# Patient Record
Sex: FEMALE | Race: WHITE | NOT HISPANIC OR LATINO | Employment: PART TIME | URBAN - METROPOLITAN AREA
[De-identification: names, ages, dates, MRNs, and addresses within clinical notes are randomized per-mention and may not be internally consistent; named-entity substitution may affect disease eponyms.]

---

## 2018-02-27 ASSESSMENT — MIFFLIN-ST. JEOR: SCORE: 1853.53

## 2018-02-28 ENCOUNTER — ANESTHESIA - HEALTHEAST (OUTPATIENT)
Dept: SURGERY | Facility: CLINIC | Age: 28
End: 2018-02-28

## 2018-02-28 ENCOUNTER — SURGERY - HEALTHEAST (OUTPATIENT)
Dept: SURGERY | Facility: CLINIC | Age: 28
End: 2018-02-28

## 2018-02-28 ASSESSMENT — MIFFLIN-ST. JEOR: SCORE: 1853.53

## 2018-03-03 ENCOUNTER — COMMUNICATION - HEALTHEAST (OUTPATIENT)
Dept: SCHEDULING | Facility: CLINIC | Age: 28
End: 2018-03-03

## 2020-11-10 LAB — CYTOLOGY CVX/VAG DOC THIN PREP: NORMAL

## 2021-06-01 VITALS — HEIGHT: 62 IN | WEIGHT: 258 LBS | BODY MASS INDEX: 47.48 KG/M2

## 2021-06-16 NOTE — ANESTHESIA CARE TRANSFER NOTE
Last vitals:   Vitals:    02/28/18 1745   BP: (!) 160/92   Pulse: (!) 107   Resp: 20   Temp: 36.9  C (98.4  F)   SpO2: 100%     Patient's level of consciousness is drowsy  Spontaneous respirations: yes  Maintains airway independently: yes  Dentition unchanged: yes  Oropharynx: oropharynx clear of all foreign objects    QCDR Measures:  ASA# 20 - Surgical Safety Checklist: WHO surgical safety checklist completed prior to induction  PQRS# 430 - Adult PONV Prevention: 4558F - Pt received => 2 anti-emetic agents (different classes) preop & intraop  ASA# 8 - Peds PONV Prevention: NA - Not pediatric patient, not GA or 2 or more risk factors NOT present  PQRS# 424 - Evelia-op Temp Management: 4559F - At least one body temp DOCUMENTED => 35.5C or 95.9F within required timeframe  PQRS# 426 - PACU Transfer Protocol: - Transfer of care checklist used  ASA# 14 - Acute Post-op Pain: ASA14B - Patient did NOT experience pain >= 7 out of 10

## 2021-06-16 NOTE — ANESTHESIA PROCEDURE NOTES
Peripheral Block    Patient location during procedure: pre-op  Start time: 2/28/2018 2:47 PM  End time: 2/28/2018 2:48 PM  post-op analgesia per surgeon order as noted in medical record  Staffing:  Performing  Anesthesiologist: DAKOTAH NUNEZ IV  Preanesthetic Checklist  Completed: patient identified, site marked, risks, benefits, and alternatives discussed, timeout performed, consent obtained, at patient's request, airway assessed, oxygen available, suction available, emergency drugs available and hand hygiene performed  Peripheral Block  Block type: saphenous  Prep: ChloraPrep  Patient position: supine  Patient monitoring: cardiac monitor, continuous pulse oximetry, heart rate and blood pressure  Laterality: left  Injection technique: ultrasound guided          lidocaine 1% local anesthesia used for local skin prep  Needle  Needle type: echogenic   Needle gauge: 21 G  Needle length: 4 in  no peripheral nerve catheter placed  Assessment  Injection assessment: no difficulty with injection, negative aspiration for heme, no paresthesia on injection and incremental injection

## 2021-06-16 NOTE — ANESTHESIA PREPROCEDURE EVALUATION
Anesthesia Evaluation      Patient summary reviewed   No history of anesthetic complications     Airway   Mallampati: II  Neck ROM: full   Pulmonary - normal exam                          Cardiovascular - negative ROS and normal exam   Neuro/Psych - negative ROS     Endo/Other - negative ROS      GI/Hepatic/Renal            Dental                         Anesthesia Plan  Planned anesthetic: general LMA and peripheral nerve block    ASA 3   Induction: intravenous   Anesthetic plan and risks discussed with: patient

## 2021-06-16 NOTE — ANESTHESIA POSTPROCEDURE EVALUATION
Patient: Ben Upton  LEFT ANKLE ARTHROSCOPIC DEBRIDEMENT, LATERAL LIGAMENT REPAIR, MEDIAL AND LATERAL TENDON REPAIR, POSTERIOR TALUS FRACTURE FRAGMENT EXCISION  Anesthesia type: general    Patient location: PACU  Last vitals:   Vitals:    02/28/18 1810   BP: (!) 171/93   Pulse: 94   Resp: (!) 53   Temp:    SpO2: 100%     Post vital signs: stable  Level of consciousness: awake and responds to simple questions  Post-anesthesia pain: pain controlled  Post-anesthesia nausea and vomiting: no  Pulmonary: unassisted, return to baseline  Cardiovascular: stable and blood pressure at baseline  Hydration: adequate  Anesthetic events: no    QCDR Measures:  ASA# 11 - Evelia-op Cardiac Arrest: ASA11B - Patient did NOT experience unanticipated cardiac arrest  ASA# 12 - Evelia-op Mortality Rate: ASA12B - Patient did NOT die  ASA# 13 - PACU Re-Intubation Rate: ASA13B - Patient did NOT require a new airway mgmt  ASA# 10 - Composite Anes Safety: ASA10A - No serious adverse event    Additional Notes:

## 2021-06-16 NOTE — ANESTHESIA PROCEDURE NOTES
Peripheral Block    Patient location during procedure: pre-op  Start time: 2/28/2018 2:45 PM  End time: 2/28/2018 2:47 PM  post-op analgesia per surgeon order as noted in medical record  Staffing:  Performing  Anesthesiologist: DAKOTAH NUNEZ IV  Preanesthetic Checklist  Completed: patient identified, site marked, risks, benefits, and alternatives discussed, timeout performed, consent obtained, at patient's request, airway assessed, oxygen available, suction available, emergency drugs available and hand hygiene performed  Peripheral Block  Block type: sciatic, popliteal  Prep: ChloraPrep  Patient position: right lateral decubitus  Patient monitoring: cardiac monitor, continuous pulse oximetry, heart rate and blood pressure  Laterality: left  Injection technique: ultrasound guided          lidocaine 1% local anesthesia used for local skin prep  Needle  Needle type: echogenic   Needle gauge: 21 G  Needle length: 4 in  no peripheral nerve catheter placed  Assessment  Injection assessment: negative aspiration for heme, no paresthesia on injection, incremental injection and no difficulty with injection

## 2023-06-15 ENCOUNTER — OFFICE VISIT (OUTPATIENT)
Dept: INTERNAL MEDICINE | Age: 33
End: 2023-06-15

## 2023-06-15 ENCOUNTER — LAB SERVICES (OUTPATIENT)
Dept: LAB | Age: 33
End: 2023-06-15

## 2023-06-15 VITALS
DIASTOLIC BLOOD PRESSURE: 72 MMHG | BODY MASS INDEX: 50.02 KG/M2 | HEART RATE: 117 BPM | HEIGHT: 64 IN | WEIGHT: 293 LBS | SYSTOLIC BLOOD PRESSURE: 112 MMHG | OXYGEN SATURATION: 98 %

## 2023-06-15 DIAGNOSIS — F90.0 ATTENTION DEFICIT HYPERACTIVITY DISORDER (ADHD), PREDOMINANTLY INATTENTIVE TYPE: ICD-10-CM

## 2023-06-15 DIAGNOSIS — F41.1 GAD (GENERALIZED ANXIETY DISORDER): ICD-10-CM

## 2023-06-15 DIAGNOSIS — F31.9 BIPOLAR 1 DISORDER (CMD): ICD-10-CM

## 2023-06-15 DIAGNOSIS — E55.9 VITAMIN D DEFICIENCY: ICD-10-CM

## 2023-06-15 DIAGNOSIS — G47.09 OTHER INSOMNIA: ICD-10-CM

## 2023-06-15 DIAGNOSIS — J45.20 MILD INTERMITTENT ASTHMA WITHOUT COMPLICATION: ICD-10-CM

## 2023-06-15 DIAGNOSIS — Z79.899 MEDICATION MANAGEMENT: Primary | ICD-10-CM

## 2023-06-15 DIAGNOSIS — F42.2 MIXED OBSESSIONAL THOUGHTS AND ACTS: ICD-10-CM

## 2023-06-15 DIAGNOSIS — Z12.4 PAP SMEAR FOR CERVICAL CANCER SCREENING: ICD-10-CM

## 2023-06-15 DIAGNOSIS — Z79.899 MEDICATION MANAGEMENT: ICD-10-CM

## 2023-06-15 DIAGNOSIS — Z78.9 USES BIRTH CONTROL: ICD-10-CM

## 2023-06-15 PROBLEM — F42.9 OCD (OBSESSIVE COMPULSIVE DISORDER): Status: ACTIVE | Noted: 2023-06-15

## 2023-06-15 LAB
ALBUMIN SERPL-MCNC: 3.5 G/DL (ref 3.6–5.1)
ALBUMIN/GLOB SERPL: 1.1 {RATIO} (ref 1–2.4)
ALP SERPL-CCNC: 148 UNITS/L (ref 45–117)
ALT SERPL-CCNC: 25 UNITS/L
ANION GAP SERPL CALC-SCNC: 10 MMOL/L (ref 7–19)
AST SERPL-CCNC: 19 UNITS/L
BILIRUB SERPL-MCNC: 0.4 MG/DL (ref 0.2–1)
BUN SERPL-MCNC: 15 MG/DL (ref 6–20)
BUN/CREAT SERPL: 19 (ref 7–25)
CALCIUM SERPL-MCNC: 9 MG/DL (ref 8.4–10.2)
CHLORIDE SERPL-SCNC: 109 MMOL/L (ref 97–110)
CHOLEST SERPL-MCNC: 167 MG/DL
CHOLEST/HDLC SERPL: 3.1 {RATIO}
CO2 SERPL-SCNC: 24 MMOL/L (ref 21–32)
CREAT SERPL-MCNC: 0.79 MG/DL (ref 0.51–0.95)
DEPRECATED RDW RBC: 45.6 FL (ref 39–50)
ERYTHROCYTE [DISTWIDTH] IN BLOOD: 16.8 % (ref 11–15)
FASTING DURATION TIME PATIENT: ABNORMAL H
GFR SERPLBLD BASED ON 1.73 SQ M-ARVRAT: >90 ML/MIN
GLOBULIN SER-MCNC: 3.3 G/DL (ref 2–4)
GLUCOSE SERPL-MCNC: 90 MG/DL (ref 70–99)
HCT VFR BLD CALC: 38 % (ref 36–46.5)
HDLC SERPL-MCNC: 54 MG/DL
HGB BLD-MCNC: 11.9 G/DL (ref 12–15.5)
LDLC SERPL CALC-MCNC: 98 MG/DL
MCH RBC QN AUTO: 23.8 PG (ref 26–34)
MCHC RBC AUTO-ENTMCNC: 31.3 G/DL (ref 32–36.5)
MCV RBC AUTO: 76.2 FL (ref 78–100)
NONHDLC SERPL-MCNC: 113 MG/DL
NRBC BLD MANUAL-RTO: 0 /100 WBC
PLATELET # BLD AUTO: 311 K/MCL (ref 140–450)
POTASSIUM SERPL-SCNC: 4.3 MMOL/L (ref 3.4–5.1)
PROT SERPL-MCNC: 6.8 G/DL (ref 6.4–8.2)
RBC # BLD: 4.99 MIL/MCL (ref 4–5.2)
SODIUM SERPL-SCNC: 139 MMOL/L (ref 135–145)
TRIGL SERPL-MCNC: 74 MG/DL
WBC # BLD: 8.8 K/MCL (ref 4.2–11)

## 2023-06-15 PROCEDURE — 85025 COMPLETE CBC W/AUTO DIFF WBC: CPT | Performed by: CLINICAL MEDICAL LABORATORY

## 2023-06-15 PROCEDURE — 80061 LIPID PANEL: CPT | Performed by: CLINICAL MEDICAL LABORATORY

## 2023-06-15 PROCEDURE — 80053 COMPREHEN METABOLIC PANEL: CPT | Performed by: CLINICAL MEDICAL LABORATORY

## 2023-06-15 PROCEDURE — 99204 OFFICE O/P NEW MOD 45 MIN: CPT | Performed by: INTERNAL MEDICINE

## 2023-06-15 PROCEDURE — 82306 VITAMIN D 25 HYDROXY: CPT | Performed by: CLINICAL MEDICAL LABORATORY

## 2023-06-15 PROCEDURE — 99000 SPECIMEN HANDLING OFFICE-LAB: CPT | Performed by: INTERNAL MEDICINE

## 2023-06-15 RX ORDER — EPINEPHRINE 0.3 MG/.3ML
0.3 INJECTION SUBCUTANEOUS
Qty: 2 EACH | Refills: 1 | OUTPATIENT
Start: 2023-06-15

## 2023-06-15 RX ORDER — ONDANSETRON 8 MG/1
8 TABLET, ORALLY DISINTEGRATING ORAL EVERY 8 HOURS PRN
COMMUNITY
Start: 2023-03-20

## 2023-06-15 RX ORDER — CLOZAPINE 100 MG/1
300 TABLET ORAL AT BEDTIME
COMMUNITY
End: 2023-07-10 | Stop reason: SDUPTHER

## 2023-06-15 RX ORDER — ETONOGESTREL 68 MG/1
68 IMPLANT SUBCUTANEOUS
COMMUNITY

## 2023-06-15 RX ORDER — SODIUM FLUORIDE 5 MG/G
GEL, DENTIFRICE DENTAL
COMMUNITY
Start: 2023-02-06

## 2023-06-15 RX ORDER — EPINEPHRINE 0.3 MG/.3ML
0.3 INJECTION SUBCUTANEOUS
Qty: 2 EACH | Refills: 1 | Status: SHIPPED | OUTPATIENT
Start: 2023-06-15

## 2023-06-15 RX ORDER — DIPHENHYDRAMINE HCL 12.5MG/5ML
LIQUID (ML) ORAL
COMMUNITY

## 2023-06-15 RX ORDER — IBUPROFEN 600 MG/1
600 TABLET ORAL PRN
COMMUNITY
End: 2023-06-18 | Stop reason: ALTCHOICE

## 2023-06-15 RX ORDER — ACYCLOVIR 400 MG/1
400 TABLET ORAL PRN
COMMUNITY
Start: 2023-03-22

## 2023-06-15 RX ORDER — CLOMIPRAMINE HYDROCHLORIDE 50 MG/1
50 CAPSULE ORAL NIGHTLY
COMMUNITY
Start: 2023-05-30 | End: 2023-07-10 | Stop reason: SDUPTHER

## 2023-06-15 RX ORDER — ALBUTEROL SULFATE 2.5 MG/3ML
2.5 SOLUTION RESPIRATORY (INHALATION) EVERY 4 HOURS PRN
COMMUNITY
Start: 2022-12-30

## 2023-06-15 RX ORDER — ATOMOXETINE 80 MG/1
80 CAPSULE ORAL DAILY
COMMUNITY
Start: 2023-05-30 | End: 2023-07-10 | Stop reason: SDUPTHER

## 2023-06-15 ASSESSMENT — PATIENT HEALTH QUESTIONNAIRE - PHQ9
SUM OF ALL RESPONSES TO PHQ9 QUESTIONS 1 AND 2: 0
1. LITTLE INTEREST OR PLEASURE IN DOING THINGS: NOT AT ALL
SUM OF ALL RESPONSES TO PHQ9 QUESTIONS 1 AND 2: 0
CLINICAL INTERPRETATION OF PHQ2 SCORE: NO FURTHER SCREENING NEEDED
2. FEELING DOWN, DEPRESSED OR HOPELESS: NOT AT ALL

## 2023-06-16 ENCOUNTER — TELEPHONE (OUTPATIENT)
Dept: INTERNAL MEDICINE | Age: 33
End: 2023-06-16

## 2023-06-16 DIAGNOSIS — Z79.899 MEDICATION MANAGEMENT: Primary | ICD-10-CM

## 2023-06-16 LAB
25(OH)D3+25(OH)D2 SERPL-MCNC: 17.9 NG/ML (ref 30–100)
BASOPHILS # BLD: 0.1 K/MCL (ref 0–0.3)
BASOPHILS NFR BLD: 1 %
EOSINOPHIL # BLD: 0.4 K/MCL (ref 0–0.5)
EOSINOPHIL NFR BLD: 4 %
ERYTHROCYTE [DISTWIDTH] IN BLOOD: ABNORMAL %
HCT VFR BLD CALC: 38 % (ref 36–46.5)
HGB BLD-MCNC: 11.9 G/DL (ref 12–15.5)
IMM GRANULOCYTES # BLD AUTO: 0.1 K/MCL (ref 0–0.2)
IMM GRANULOCYTES # BLD: 1 %
LYMPHOCYTES # BLD: 1.9 K/MCL (ref 1–4.8)
LYMPHOCYTES NFR BLD: 21 %
MCH RBC QN AUTO: ABNORMAL PG
MCHC RBC AUTO-ENTMCNC: ABNORMAL G/DL
MCV RBC AUTO: ABNORMAL FL
MONOCYTES # BLD: 0.4 K/MCL (ref 0.3–0.9)
MONOCYTES NFR BLD: 5 %
NEUTROPHILS # BLD: 6.3 K/MCL (ref 1.8–7.7)
NEUTROPHILS NFR BLD: 68 %
PLATELET # BLD AUTO: 311 K/MCL (ref 140–450)
RBC # BLD: ABNORMAL 10*6/UL
WBC # BLD: 8.8 K/MCL (ref 4.2–11)

## 2023-06-18 ENCOUNTER — HOSPITAL ENCOUNTER (OUTPATIENT)
Age: 33
Discharge: HOME | End: 2023-06-18
Attending: EMERGENCY MEDICINE

## 2023-06-18 VITALS
BODY MASS INDEX: 51.91 KG/M2 | TEMPERATURE: 97.9 F | DIASTOLIC BLOOD PRESSURE: 89 MMHG | HEART RATE: 111 BPM | SYSTOLIC BLOOD PRESSURE: 139 MMHG | WEIGHT: 293 LBS | HEIGHT: 63 IN | OXYGEN SATURATION: 100 % | RESPIRATION RATE: 16 BRPM

## 2023-06-18 DIAGNOSIS — S39.012A STRAIN OF LUMBAR REGION, INITIAL ENCOUNTER: Primary | ICD-10-CM

## 2023-06-18 PROCEDURE — 99202 OFFICE O/P NEW SF 15 MIN: CPT

## 2023-06-18 RX ORDER — LIDOCAINE 4 G/G
1 PATCH TOPICAL EVERY 24 HOURS
Qty: 7 PATCH | Refills: 0 | Status: SHIPPED | OUTPATIENT
Start: 2023-06-18

## 2023-06-18 RX ORDER — DICLOFENAC SODIUM 75 MG/1
75 TABLET, DELAYED RELEASE ORAL 2 TIMES DAILY PRN
Qty: 20 TABLET | Refills: 0 | Status: SHIPPED | OUTPATIENT
Start: 2023-06-18

## 2023-06-18 ASSESSMENT — PAIN SCALES - GENERAL: PAINLEVEL_OUTOF10: 8

## 2023-06-19 ENCOUNTER — TELEPHONE (OUTPATIENT)
Dept: INTERNAL MEDICINE | Age: 33
End: 2023-06-19

## 2023-06-20 ENCOUNTER — TELEPHONE (OUTPATIENT)
Dept: INTERNAL MEDICINE | Age: 33
End: 2023-06-20

## 2023-06-20 DIAGNOSIS — R79.89 LOW VITAMIN D LEVEL: Primary | ICD-10-CM

## 2023-06-20 DIAGNOSIS — D64.9 ANEMIA, UNSPECIFIED TYPE: ICD-10-CM

## 2023-06-20 DIAGNOSIS — R74.8 ELEVATED ALKALINE PHOSPHATASE LEVEL: ICD-10-CM

## 2023-06-21 ENCOUNTER — APPOINTMENT (OUTPATIENT)
Dept: OBGYN | Age: 33
End: 2023-06-21

## 2023-06-21 RX ORDER — ERGOCALCIFEROL 1.25 MG/1
1 CAPSULE ORAL
Qty: 12 CAPSULE | Refills: 0 | Status: SHIPPED | OUTPATIENT
Start: 2023-06-26 | End: 2023-09-20 | Stop reason: SDUPTHER

## 2023-06-22 ENCOUNTER — TELEPHONE (OUTPATIENT)
Dept: INTERNAL MEDICINE | Age: 33
End: 2023-06-22

## 2023-06-28 ENCOUNTER — OFFICE VISIT (OUTPATIENT)
Dept: OBGYN | Age: 33
End: 2023-06-28
Attending: INTERNAL MEDICINE

## 2023-06-28 VITALS
HEIGHT: 63 IN | BODY MASS INDEX: 51.91 KG/M2 | SYSTOLIC BLOOD PRESSURE: 118 MMHG | WEIGHT: 293 LBS | DIASTOLIC BLOOD PRESSURE: 84 MMHG

## 2023-06-28 DIAGNOSIS — Z01.419 ENCOUNTER FOR BREAST AND PELVIC EXAMINATION: Primary | ICD-10-CM

## 2023-06-28 DIAGNOSIS — Z12.4 SCREENING FOR MALIGNANT NEOPLASM OF CERVIX: ICD-10-CM

## 2023-06-28 PROCEDURE — G0101 CA SCREEN;PELVIC/BREAST EXAM: HCPCS | Performed by: NURSE PRACTITIONER

## 2023-06-28 PROCEDURE — 87624 HPV HI-RISK TYP POOLED RSLT: CPT | Performed by: CLINICAL MEDICAL LABORATORY

## 2023-06-28 PROCEDURE — 88175 CYTOPATH C/V AUTO FLUID REDO: CPT | Performed by: CLINICAL MEDICAL LABORATORY

## 2023-07-05 ENCOUNTER — HOSPITAL ENCOUNTER (OUTPATIENT)
Age: 33
Discharge: HOME | End: 2023-07-05
Attending: EMERGENCY MEDICINE

## 2023-07-05 VITALS
WEIGHT: 293 LBS | BODY MASS INDEX: 51.91 KG/M2 | DIASTOLIC BLOOD PRESSURE: 80 MMHG | HEIGHT: 63 IN | RESPIRATION RATE: 14 BRPM | TEMPERATURE: 98.5 F | OXYGEN SATURATION: 100 % | SYSTOLIC BLOOD PRESSURE: 121 MMHG | HEART RATE: 112 BPM

## 2023-07-05 DIAGNOSIS — L50.9 URTICARIA: Primary | ICD-10-CM

## 2023-07-05 PROCEDURE — 99211 OFF/OP EST MAY X REQ PHY/QHP: CPT

## 2023-07-05 PROCEDURE — 10002803 HB RX 637: Performed by: EMERGENCY MEDICINE

## 2023-07-05 RX ORDER — PREDNISONE 20 MG/1
60 TABLET ORAL DAILY
Qty: 12 TABLET | Refills: 0 | Status: SHIPPED | OUTPATIENT
Start: 2023-07-06 | End: 2023-07-10

## 2023-07-05 RX ORDER — CETIRIZINE HYDROCHLORIDE 10 MG/1
10 TABLET ORAL DAILY
Qty: 5 TABLET | Refills: 0 | Status: SHIPPED | OUTPATIENT
Start: 2023-07-05 | End: 2023-09-20

## 2023-07-05 RX ORDER — PREDNISONE 20 MG/1
60 TABLET ORAL ONCE
Status: COMPLETED | OUTPATIENT
Start: 2023-07-05 | End: 2023-07-05

## 2023-07-05 RX ADMIN — PREDNISONE 60 MG: 20 TABLET ORAL at 10:44

## 2023-07-07 LAB
CASE RPRT: NORMAL
CLINICAL INFO: NORMAL
CYTOLOGY CVX/VAG STUDY: NORMAL
HPV16+18+45 E6+E7MRNA CVX NAA+PROBE: NEGATIVE
Lab: NORMAL
PAP EDUCATIONAL NOTE: NORMAL
SPECIMEN ADEQUACY: NORMAL

## 2023-07-10 ENCOUNTER — BEHAVIORAL HEALTH (OUTPATIENT)
Dept: BEHAVIORAL HEALTH | Age: 33
End: 2023-07-10
Attending: INTERNAL MEDICINE

## 2023-07-10 DIAGNOSIS — F90.0 ATTENTION DEFICIT HYPERACTIVITY DISORDER (ADHD), PREDOMINANTLY INATTENTIVE TYPE: ICD-10-CM

## 2023-07-10 DIAGNOSIS — F31.9 BIPOLAR 1 DISORDER (CMD): Primary | ICD-10-CM

## 2023-07-10 DIAGNOSIS — F42.2 MIXED OBSESSIONAL THOUGHTS AND ACTS: ICD-10-CM

## 2023-07-10 DIAGNOSIS — G24.01 TARDIVE DYSKINESIA: ICD-10-CM

## 2023-07-10 DIAGNOSIS — F41.1 GAD (GENERALIZED ANXIETY DISORDER): ICD-10-CM

## 2023-07-10 PROCEDURE — 90792 PSYCH DIAG EVAL W/MED SRVCS: CPT

## 2023-07-10 RX ORDER — TRAZODONE HYDROCHLORIDE 50 MG/1
25 TABLET ORAL NIGHTLY PRN
Qty: 15 TABLET | Refills: 5 | Status: SHIPPED | OUTPATIENT
Start: 2023-07-10

## 2023-07-10 RX ORDER — ATOMOXETINE 80 MG/1
80 CAPSULE ORAL DAILY
Qty: 30 CAPSULE | Refills: 5 | Status: SHIPPED | OUTPATIENT
Start: 2023-07-10

## 2023-07-10 RX ORDER — CLOZAPINE 100 MG/1
300 TABLET ORAL AT BEDTIME
Qty: 90 TABLET | Refills: 5 | Status: SHIPPED | OUTPATIENT
Start: 2023-07-10

## 2023-07-10 RX ORDER — CLOMIPRAMINE HYDROCHLORIDE 50 MG/1
50 CAPSULE ORAL NIGHTLY
Qty: 30 CAPSULE | Refills: 5 | Status: SHIPPED | OUTPATIENT
Start: 2023-07-10

## 2023-07-10 RX ORDER — PROPRANOLOL HYDROCHLORIDE 20 MG/1
20 TABLET ORAL 2 TIMES DAILY
Qty: 60 TABLET | Refills: 5 | Status: SHIPPED | OUTPATIENT
Start: 2023-07-10

## 2023-07-10 RX ORDER — VALBENAZINE 60 MG/1
60 CAPSULE ORAL DAILY
Qty: 30 CAPSULE | Refills: 5 | Status: SHIPPED | OUTPATIENT
Start: 2023-07-10 | End: 2023-10-12 | Stop reason: DRUGHIGH

## 2023-07-14 ENCOUNTER — TELEPHONE (OUTPATIENT)
Dept: BEHAVIORAL HEALTH | Age: 33
End: 2023-07-14

## 2023-07-14 ENCOUNTER — APPOINTMENT (OUTPATIENT)
Dept: LAB | Age: 33
End: 2023-07-14

## 2023-07-14 ENCOUNTER — LAB SERVICES (OUTPATIENT)
Dept: LAB | Age: 33
End: 2023-07-14

## 2023-07-14 DIAGNOSIS — F41.1 GAD (GENERALIZED ANXIETY DISORDER): ICD-10-CM

## 2023-07-14 DIAGNOSIS — F31.9 BIPOLAR 1 DISORDER (CMD): ICD-10-CM

## 2023-07-14 LAB
BASOPHILS # BLD: 0.1 K/MCL (ref 0–0.3)
BASOPHILS NFR BLD: 1 %
DEPRECATED RDW RBC: 46.3 FL (ref 39–50)
EOSINOPHIL # BLD: 0.6 K/MCL (ref 0–0.5)
EOSINOPHIL NFR BLD: 5 %
ERYTHROCYTE [DISTWIDTH] IN BLOOD: 16.7 % (ref 11–15)
HCT VFR BLD CALC: 36.9 % (ref 36–46.5)
HGB BLD-MCNC: 11.4 G/DL (ref 12–15.5)
IMM GRANULOCYTES # BLD AUTO: 0.1 K/MCL (ref 0–0.2)
IMM GRANULOCYTES # BLD: 1 %
LYMPHOCYTES # BLD: 2.4 K/MCL (ref 1–4.8)
LYMPHOCYTES NFR BLD: 19 %
MCH RBC QN AUTO: 23.7 PG (ref 26–34)
MCHC RBC AUTO-ENTMCNC: 30.9 G/DL (ref 32–36.5)
MCV RBC AUTO: 76.7 FL (ref 78–100)
MONOCYTES # BLD: 0.7 K/MCL (ref 0.3–0.9)
MONOCYTES NFR BLD: 5 %
NEUTROPHILS # BLD: 8.5 K/MCL (ref 1.8–7.7)
NEUTROPHILS NFR BLD: 69 %
NRBC BLD MANUAL-RTO: 0 /100 WBC
PLATELET # BLD AUTO: 337 K/MCL (ref 140–450)
RBC # BLD: 4.81 MIL/MCL (ref 4–5.2)
WBC # BLD: 12.4 K/MCL (ref 4.2–11)

## 2023-07-14 PROCEDURE — 85025 COMPLETE CBC W/AUTO DIFF WBC: CPT | Performed by: CLINICAL MEDICAL LABORATORY

## 2023-07-14 PROCEDURE — 99000 SPECIMEN HANDLING OFFICE-LAB: CPT | Performed by: INTERNAL MEDICINE

## 2023-08-05 ENCOUNTER — HOSPITAL ENCOUNTER (OUTPATIENT)
Age: 33
Discharge: HOME | End: 2023-08-05

## 2023-08-05 VITALS
OXYGEN SATURATION: 96 % | DIASTOLIC BLOOD PRESSURE: 88 MMHG | TEMPERATURE: 98.5 F | HEIGHT: 62 IN | WEIGHT: 293 LBS | HEART RATE: 116 BPM | BODY MASS INDEX: 53.92 KG/M2 | SYSTOLIC BLOOD PRESSURE: 132 MMHG | RESPIRATION RATE: 20 BRPM

## 2023-08-05 DIAGNOSIS — B37.2 CANDIDAL INTERTRIGO: Primary | ICD-10-CM

## 2023-08-05 PROCEDURE — 99211 OFF/OP EST MAY X REQ PHY/QHP: CPT

## 2023-08-05 ASSESSMENT — PAIN SCALES - GENERAL
PAINLEVEL_OUTOF10: 4
PAINLEVEL_OUTOF10: 4
PAINLEVEL_OUTOF10: 0

## 2023-08-15 ENCOUNTER — TELEPHONE (OUTPATIENT)
Dept: BEHAVIORAL HEALTH | Age: 33
End: 2023-08-15

## 2023-08-15 ENCOUNTER — LAB SERVICES (OUTPATIENT)
Dept: LAB | Age: 33
End: 2023-08-15

## 2023-08-15 DIAGNOSIS — F41.1 GAD (GENERALIZED ANXIETY DISORDER): ICD-10-CM

## 2023-08-15 DIAGNOSIS — F31.9 BIPOLAR 1 DISORDER (CMD): ICD-10-CM

## 2023-08-15 LAB
BASOPHILS # BLD: 0.1 K/MCL (ref 0–0.3)
BASOPHILS NFR BLD: 1 %
DEPRECATED RDW RBC: 43.9 FL (ref 39–50)
EOSINOPHIL # BLD: 0 K/MCL (ref 0–0.5)
EOSINOPHIL NFR BLD: 0 %
ERYTHROCYTE [DISTWIDTH] IN BLOOD: 16 % (ref 11–15)
HCT VFR BLD CALC: 37.5 % (ref 36–46.5)
HGB BLD-MCNC: 11.7 G/DL (ref 12–15.5)
IMM GRANULOCYTES # BLD AUTO: 0.1 K/MCL (ref 0–0.2)
IMM GRANULOCYTES # BLD: 1 %
LYMPHOCYTES # BLD: 2.3 K/MCL (ref 1–4.8)
LYMPHOCYTES NFR BLD: 22 %
MCH RBC QN AUTO: 23.7 PG (ref 26–34)
MCHC RBC AUTO-ENTMCNC: 31.2 G/DL (ref 32–36.5)
MCV RBC AUTO: 76.1 FL (ref 78–100)
MONOCYTES # BLD: 0.6 K/MCL (ref 0.3–0.9)
MONOCYTES NFR BLD: 6 %
NEUTROPHILS # BLD: 7.6 K/MCL (ref 1.8–7.7)
NEUTROPHILS NFR BLD: 70 %
NRBC BLD MANUAL-RTO: 0 /100 WBC
PLATELET # BLD AUTO: 346 K/MCL (ref 140–450)
RBC # BLD: 4.93 MIL/MCL (ref 4–5.2)
WBC # BLD: 10.6 K/MCL (ref 4.2–11)

## 2023-08-15 PROCEDURE — 99000 SPECIMEN HANDLING OFFICE-LAB: CPT | Performed by: INTERNAL MEDICINE

## 2023-08-15 PROCEDURE — 85025 COMPLETE CBC W/AUTO DIFF WBC: CPT | Performed by: CLINICAL MEDICAL LABORATORY

## 2023-09-11 ENCOUNTER — TELEPHONE (OUTPATIENT)
Dept: BEHAVIORAL HEALTH | Age: 33
End: 2023-09-11

## 2023-09-11 ENCOUNTER — LAB SERVICES (OUTPATIENT)
Dept: LAB | Age: 33
End: 2023-09-11

## 2023-09-11 DIAGNOSIS — F31.9 BIPOLAR 1 DISORDER (CMD): ICD-10-CM

## 2023-09-11 DIAGNOSIS — F41.1 GAD (GENERALIZED ANXIETY DISORDER): ICD-10-CM

## 2023-09-11 LAB
BASOPHILS # BLD: 0 K/MCL (ref 0–0.3)
BASOPHILS NFR BLD: 1 %
DEPRECATED RDW RBC: 46.6 FL (ref 39–50)
EOSINOPHIL # BLD: 0 K/MCL (ref 0–0.5)
EOSINOPHIL NFR BLD: 0 %
ERYTHROCYTE [DISTWIDTH] IN BLOOD: 16.5 % (ref 11–15)
HCT VFR BLD CALC: 40.1 % (ref 36–46.5)
HGB BLD-MCNC: 11.8 G/DL (ref 12–15.5)
IMM GRANULOCYTES # BLD AUTO: 0 K/MCL (ref 0–0.2)
IMM GRANULOCYTES # BLD: 1 %
LYMPHOCYTES # BLD: 1.9 K/MCL (ref 1–4.8)
LYMPHOCYTES NFR BLD: 22 %
MCH RBC QN AUTO: 23.4 PG (ref 26–34)
MCHC RBC AUTO-ENTMCNC: 29.4 G/DL (ref 32–36.5)
MCV RBC AUTO: 79.4 FL (ref 78–100)
MONOCYTES # BLD: 0.4 K/MCL (ref 0.3–0.9)
MONOCYTES NFR BLD: 5 %
NEUTROPHILS # BLD: 6.3 K/MCL (ref 1.8–7.7)
NEUTROPHILS NFR BLD: 71 %
NRBC BLD MANUAL-RTO: 0 /100 WBC
PLATELET # BLD AUTO: 315 K/MCL (ref 140–450)
RBC # BLD: 5.05 MIL/MCL (ref 4–5.2)
WBC # BLD: 8.7 K/MCL (ref 4.2–11)

## 2023-09-11 PROCEDURE — 85025 COMPLETE CBC W/AUTO DIFF WBC: CPT | Performed by: CLINICAL MEDICAL LABORATORY

## 2023-09-11 PROCEDURE — 99000 SPECIMEN HANDLING OFFICE-LAB: CPT | Performed by: INTERNAL MEDICINE

## 2023-09-20 ENCOUNTER — OFFICE VISIT (OUTPATIENT)
Dept: INTERNAL MEDICINE | Age: 33
End: 2023-09-20

## 2023-09-20 VITALS
DIASTOLIC BLOOD PRESSURE: 70 MMHG | OXYGEN SATURATION: 98 % | BODY MASS INDEX: 53.92 KG/M2 | WEIGHT: 293 LBS | HEIGHT: 62 IN | SYSTOLIC BLOOD PRESSURE: 112 MMHG | HEART RATE: 102 BPM

## 2023-09-20 DIAGNOSIS — Z00.00 ANNUAL PHYSICAL EXAM: Primary | ICD-10-CM

## 2023-09-20 DIAGNOSIS — E55.9 VITAMIN D DEFICIENCY: ICD-10-CM

## 2023-09-20 DIAGNOSIS — Z23 NEED FOR VACCINATION: Primary | ICD-10-CM

## 2023-09-20 DIAGNOSIS — R79.89 LOW VITAMIN D LEVEL: ICD-10-CM

## 2023-09-20 DIAGNOSIS — F31.9 BIPOLAR 1 DISORDER (CMD): ICD-10-CM

## 2023-09-20 DIAGNOSIS — F90.0 ATTENTION DEFICIT HYPERACTIVITY DISORDER (ADHD), PREDOMINANTLY INATTENTIVE TYPE: ICD-10-CM

## 2023-09-20 DIAGNOSIS — J45.20 MILD INTERMITTENT ASTHMA WITHOUT COMPLICATION: ICD-10-CM

## 2023-09-20 PROCEDURE — 90686 IIV4 VACC NO PRSV 0.5 ML IM: CPT | Performed by: INTERNAL MEDICINE

## 2023-09-20 PROCEDURE — 99214 OFFICE O/P EST MOD 30 MIN: CPT | Performed by: INTERNAL MEDICINE

## 2023-09-20 RX ORDER — ALBUTEROL SULFATE 90 UG/1
2 AEROSOL, METERED RESPIRATORY (INHALATION) EVERY 4 HOURS PRN
Qty: 1 EACH | Refills: 1 | Status: SHIPPED | OUTPATIENT
Start: 2023-09-20

## 2023-09-20 RX ORDER — ERGOCALCIFEROL 1.25 MG/1
1 CAPSULE ORAL
Qty: 8 CAPSULE | Refills: 0 | Status: SHIPPED | OUTPATIENT
Start: 2023-09-20 | End: 2023-11-14

## 2023-09-20 ASSESSMENT — PATIENT HEALTH QUESTIONNAIRE - PHQ9
SUM OF ALL RESPONSES TO PHQ9 QUESTIONS 1 AND 2: 0
CLINICAL INTERPRETATION OF PHQ2 SCORE: NO FURTHER SCREENING NEEDED
1. LITTLE INTEREST OR PLEASURE IN DOING THINGS: NOT AT ALL
SUM OF ALL RESPONSES TO PHQ9 QUESTIONS 1 AND 2: 0
2. FEELING DOWN, DEPRESSED OR HOPELESS: NOT AT ALL

## 2023-09-21 ENCOUNTER — TELEPHONE (OUTPATIENT)
Dept: BEHAVIORAL HEALTH | Age: 33
End: 2023-09-21

## 2023-09-26 ENCOUNTER — BEHAVIORAL HEALTH (OUTPATIENT)
Dept: BEHAVIORAL HEALTH | Age: 33
End: 2023-09-26

## 2023-09-26 DIAGNOSIS — F31.9 BIPOLAR 1 DISORDER (CMD): Primary | ICD-10-CM

## 2023-09-26 DIAGNOSIS — F41.1 GAD (GENERALIZED ANXIETY DISORDER): ICD-10-CM

## 2023-09-26 DIAGNOSIS — F90.0 ATTENTION DEFICIT HYPERACTIVITY DISORDER (ADHD), PREDOMINANTLY INATTENTIVE TYPE: ICD-10-CM

## 2023-09-26 DIAGNOSIS — F42.2 MIXED OBSESSIONAL THOUGHTS AND ACTS: ICD-10-CM

## 2023-09-26 PROCEDURE — 90791 PSYCH DIAGNOSTIC EVALUATION: CPT | Performed by: COUNSELOR

## 2023-10-04 ENCOUNTER — LAB SERVICES (OUTPATIENT)
Dept: LAB | Age: 33
End: 2023-10-04

## 2023-10-04 DIAGNOSIS — R74.8 ELEVATED ALKALINE PHOSPHATASE LEVEL: ICD-10-CM

## 2023-10-04 DIAGNOSIS — F31.9 BIPOLAR 1 DISORDER (CMD): ICD-10-CM

## 2023-10-04 DIAGNOSIS — F41.1 GAD (GENERALIZED ANXIETY DISORDER): ICD-10-CM

## 2023-10-04 DIAGNOSIS — R79.89 LOW VITAMIN D LEVEL: ICD-10-CM

## 2023-10-04 DIAGNOSIS — D64.9 ANEMIA, UNSPECIFIED TYPE: ICD-10-CM

## 2023-10-04 LAB
ALP SERPL-CCNC: 164 UNITS/L (ref 45–117)
BASOPHILS # BLD: 0 K/MCL (ref 0–0.3)
BASOPHILS NFR BLD: 0 %
DEPRECATED RDW RBC: 44.6 FL (ref 39–50)
EOSINOPHIL # BLD: 0 K/MCL (ref 0–0.5)
EOSINOPHIL NFR BLD: 0 %
ERYTHROCYTE [DISTWIDTH] IN BLOOD: 16 % (ref 11–15)
HCT VFR BLD CALC: 38.8 % (ref 36–46.5)
HGB BLD-MCNC: 11.8 G/DL (ref 12–15.5)
IMM GRANULOCYTES # BLD AUTO: 0 K/MCL (ref 0–0.2)
IMM GRANULOCYTES # BLD: 1 %
IRON SATN MFR SERPL: 12 % (ref 15–45)
IRON SERPL-MCNC: 40 MCG/DL (ref 50–170)
LYMPHOCYTES # BLD: 2.3 K/MCL (ref 1–4.8)
LYMPHOCYTES NFR BLD: 29 %
MCH RBC QN AUTO: 23.6 PG (ref 26–34)
MCHC RBC AUTO-ENTMCNC: 30.4 G/DL (ref 32–36.5)
MCV RBC AUTO: 77.4 FL (ref 78–100)
MONOCYTES # BLD: 0.4 K/MCL (ref 0.3–0.9)
MONOCYTES NFR BLD: 5 %
NEUTROPHILS # BLD: 5.4 K/MCL (ref 1.8–7.7)
NEUTROPHILS NFR BLD: 65 %
NRBC BLD MANUAL-RTO: 0 /100 WBC
PLATELET # BLD AUTO: 346 K/MCL (ref 140–450)
RBC # BLD: 5.01 MIL/MCL (ref 4–5.2)
TIBC SERPL-MCNC: 340 MCG/DL (ref 250–450)
WBC # BLD: 8.2 K/MCL (ref 4.2–11)

## 2023-10-04 PROCEDURE — 84075 ASSAY ALKALINE PHOSPHATASE: CPT | Performed by: CLINICAL MEDICAL LABORATORY

## 2023-10-04 PROCEDURE — 83540 ASSAY OF IRON: CPT | Performed by: CLINICAL MEDICAL LABORATORY

## 2023-10-04 PROCEDURE — 99000 SPECIMEN HANDLING OFFICE-LAB: CPT | Performed by: INTERNAL MEDICINE

## 2023-10-04 PROCEDURE — 85025 COMPLETE CBC W/AUTO DIFF WBC: CPT | Performed by: CLINICAL MEDICAL LABORATORY

## 2023-10-04 PROCEDURE — 83550 IRON BINDING TEST: CPT | Performed by: CLINICAL MEDICAL LABORATORY

## 2023-10-04 PROCEDURE — 82306 VITAMIN D 25 HYDROXY: CPT | Performed by: CLINICAL MEDICAL LABORATORY

## 2023-10-05 ENCOUNTER — TELEPHONE (OUTPATIENT)
Dept: BEHAVIORAL HEALTH | Age: 33
End: 2023-10-05

## 2023-10-05 LAB — 25(OH)D3+25(OH)D2 SERPL-MCNC: 42.6 NG/ML (ref 30–100)

## 2023-10-10 ENCOUNTER — BEHAVIORAL HEALTH (OUTPATIENT)
Dept: BEHAVIORAL HEALTH | Age: 33
End: 2023-10-10

## 2023-10-10 DIAGNOSIS — F31.9 BIPOLAR 1 DISORDER (CMD): Primary | ICD-10-CM

## 2023-10-10 DIAGNOSIS — F90.0 ATTENTION DEFICIT HYPERACTIVITY DISORDER (ADHD), PREDOMINANTLY INATTENTIVE TYPE: ICD-10-CM

## 2023-10-10 DIAGNOSIS — F42.2 MIXED OBSESSIONAL THOUGHTS AND ACTS: ICD-10-CM

## 2023-10-10 DIAGNOSIS — F41.1 GAD (GENERALIZED ANXIETY DISORDER): ICD-10-CM

## 2023-10-10 PROCEDURE — 90834 PSYTX W PT 45 MINUTES: CPT | Performed by: COUNSELOR

## 2023-10-12 ENCOUNTER — BEHAVIORAL HEALTH (OUTPATIENT)
Dept: BEHAVIORAL HEALTH | Age: 33
End: 2023-10-12

## 2023-10-12 DIAGNOSIS — F31.9 BIPOLAR 1 DISORDER (CMD): Primary | ICD-10-CM

## 2023-10-12 DIAGNOSIS — F90.0 ATTENTION DEFICIT HYPERACTIVITY DISORDER (ADHD), PREDOMINANTLY INATTENTIVE TYPE: ICD-10-CM

## 2023-10-12 DIAGNOSIS — F41.1 GAD (GENERALIZED ANXIETY DISORDER): ICD-10-CM

## 2023-10-12 DIAGNOSIS — F42.2 MIXED OBSESSIONAL THOUGHTS AND ACTS: ICD-10-CM

## 2023-10-12 DIAGNOSIS — G24.01 TARDIVE DYSKINESIA: ICD-10-CM

## 2023-10-12 PROCEDURE — 99214 OFFICE O/P EST MOD 30 MIN: CPT

## 2023-10-16 ENCOUNTER — TELEPHONE (OUTPATIENT)
Dept: INTERNAL MEDICINE | Age: 33
End: 2023-10-16

## 2023-10-16 DIAGNOSIS — E61.1 LOW IRON: Primary | ICD-10-CM

## 2023-10-20 RX ORDER — FERROUS SULFATE 325(65) MG
325 TABLET ORAL 2 TIMES DAILY
Qty: 60 TABLET | Refills: 0 | Status: SHIPPED | COMMUNITY
Start: 2023-10-20

## 2023-10-23 ENCOUNTER — TELEPHONE (OUTPATIENT)
Dept: BEHAVIORAL HEALTH | Age: 33
End: 2023-10-23

## 2023-11-02 ENCOUNTER — LAB SERVICES (OUTPATIENT)
Dept: LAB | Age: 33
End: 2023-11-02

## 2023-11-02 DIAGNOSIS — F31.9 BIPOLAR 1 DISORDER (CMD): ICD-10-CM

## 2023-11-02 DIAGNOSIS — F41.1 GAD (GENERALIZED ANXIETY DISORDER): ICD-10-CM

## 2023-11-02 LAB
BASOPHILS # BLD: 0.1 K/MCL (ref 0–0.3)
BASOPHILS NFR BLD: 1 %
DEPRECATED RDW RBC: 46.9 FL (ref 39–50)
EOSINOPHIL # BLD: 0 K/MCL (ref 0–0.5)
EOSINOPHIL NFR BLD: 0 %
ERYTHROCYTE [DISTWIDTH] IN BLOOD: 16.5 % (ref 11–15)
HCT VFR BLD CALC: 40.1 % (ref 36–46.5)
HGB BLD-MCNC: 12.1 G/DL (ref 12–15.5)
IMM GRANULOCYTES # BLD AUTO: 0 K/MCL (ref 0–0.2)
IMM GRANULOCYTES # BLD: 0 %
LYMPHOCYTES # BLD: 2.6 K/MCL (ref 1–4.8)
LYMPHOCYTES NFR BLD: 28 %
MCH RBC QN AUTO: 23.6 PG (ref 26–34)
MCHC RBC AUTO-ENTMCNC: 30.2 G/DL (ref 32–36.5)
MCV RBC AUTO: 78.3 FL (ref 78–100)
MONOCYTES # BLD: 0.5 K/MCL (ref 0.3–0.9)
MONOCYTES NFR BLD: 5 %
NEUTROPHILS # BLD: 6.2 K/MCL (ref 1.8–7.7)
NEUTROPHILS NFR BLD: 66 %
NRBC BLD MANUAL-RTO: 0 /100 WBC
PLATELET # BLD AUTO: 317 K/MCL (ref 140–450)
RBC # BLD: 5.12 MIL/MCL (ref 4–5.2)
WBC # BLD: 9.3 K/MCL (ref 4.2–11)

## 2023-11-02 PROCEDURE — 85025 COMPLETE CBC W/AUTO DIFF WBC: CPT | Performed by: CLINICAL MEDICAL LABORATORY

## 2023-11-02 PROCEDURE — 99000 SPECIMEN HANDLING OFFICE-LAB: CPT | Performed by: INTERNAL MEDICINE

## 2023-11-08 ENCOUNTER — APPOINTMENT (OUTPATIENT)
Dept: DERMATOLOGY | Age: 33
End: 2023-11-08
Attending: EMERGENCY MEDICINE

## 2023-11-30 ENCOUNTER — LAB SERVICES (OUTPATIENT)
Dept: LAB | Age: 33
End: 2023-11-30

## 2023-11-30 DIAGNOSIS — F41.1 GAD (GENERALIZED ANXIETY DISORDER): ICD-10-CM

## 2023-11-30 DIAGNOSIS — E61.1 LOW IRON: ICD-10-CM

## 2023-11-30 DIAGNOSIS — F31.9 BIPOLAR 1 DISORDER (CMD): ICD-10-CM

## 2023-11-30 LAB
BASOPHILS # BLD: 0 K/MCL (ref 0–0.3)
BASOPHILS NFR BLD: 0 %
DEPRECATED RDW RBC: 46.4 FL (ref 39–50)
EOSINOPHIL # BLD: 0 K/MCL (ref 0–0.5)
EOSINOPHIL NFR BLD: 0 %
ERYTHROCYTE [DISTWIDTH] IN BLOOD: 16.3 % (ref 11–15)
HCT VFR BLD CALC: 37.9 % (ref 36–46.5)
HGB BLD-MCNC: 11.6 G/DL (ref 12–15.5)
IMM GRANULOCYTES # BLD AUTO: 0 K/MCL (ref 0–0.2)
IMM GRANULOCYTES # BLD: 0 %
IRON SERPL-MCNC: 31 MCG/DL (ref 50–170)
LYMPHOCYTES # BLD: 2.1 K/MCL (ref 1–4.8)
LYMPHOCYTES NFR BLD: 22 %
MCH RBC QN AUTO: 24.2 PG (ref 26–34)
MCHC RBC AUTO-ENTMCNC: 30.6 G/DL (ref 32–36.5)
MCV RBC AUTO: 79.1 FL (ref 78–100)
MONOCYTES # BLD: 0.7 K/MCL (ref 0.3–0.9)
MONOCYTES NFR BLD: 7 %
NEUTROPHILS # BLD: 6.9 K/MCL (ref 1.8–7.7)
NEUTROPHILS NFR BLD: 71 %
NRBC BLD MANUAL-RTO: 0 /100 WBC
PLATELET # BLD AUTO: 313 K/MCL (ref 140–450)
RBC # BLD: 4.79 MIL/MCL (ref 4–5.2)
WBC # BLD: 9.7 K/MCL (ref 4.2–11)

## 2023-11-30 PROCEDURE — 83540 ASSAY OF IRON: CPT | Performed by: CLINICAL MEDICAL LABORATORY

## 2023-11-30 PROCEDURE — 84080 ASSAY ALKALINE PHOSPHATASES: CPT | Performed by: CLINICAL MEDICAL LABORATORY

## 2023-11-30 PROCEDURE — 85025 COMPLETE CBC W/AUTO DIFF WBC: CPT | Performed by: CLINICAL MEDICAL LABORATORY

## 2023-11-30 PROCEDURE — 99000 SPECIMEN HANDLING OFFICE-LAB: CPT | Performed by: INTERNAL MEDICINE

## 2023-11-30 PROCEDURE — 84075 ASSAY ALKALINE PHOSPHATASE: CPT | Performed by: CLINICAL MEDICAL LABORATORY

## 2023-12-01 ENCOUNTER — TELEPHONE (OUTPATIENT)
Dept: INTERNAL MEDICINE | Age: 33
End: 2023-12-01

## 2023-12-01 ENCOUNTER — TELEPHONE (OUTPATIENT)
Dept: BEHAVIORAL HEALTH | Age: 33
End: 2023-12-01

## 2023-12-04 LAB
ALP BONE CFR SERPL: 23.5 % (ref 10.7–68.3)
ALP BONE CFR SERPL: 36.7 U/L (ref 12.9–52.6)
ALP INTEST CFR SERPL: 10.5 U/L (ref 0–16.3)
ALP INTEST CFR SERPL: 6.7 % (ref 0–24.2)
ALP LIVER CFR SERPL: 108.9 U/L (ref 16–69.3)
ALP LIVER CFR SERPL: 69.8 % (ref 26–86.2)
ALP SERPL-CCNC: 156 U/L (ref 34–123)

## 2023-12-29 ENCOUNTER — TELEPHONE (OUTPATIENT)
Dept: INTERNAL MEDICINE | Age: 33
End: 2023-12-29

## 2023-12-29 ENCOUNTER — LAB SERVICES (OUTPATIENT)
Dept: LAB | Age: 33
End: 2023-12-29

## 2023-12-29 DIAGNOSIS — F31.9 BIPOLAR 1 DISORDER (CMD): ICD-10-CM

## 2023-12-29 DIAGNOSIS — R74.8 ELEVATED ALKALINE PHOSPHATASE LEVEL: ICD-10-CM

## 2023-12-29 DIAGNOSIS — F41.1 GAD (GENERALIZED ANXIETY DISORDER): ICD-10-CM

## 2023-12-29 DIAGNOSIS — R73.09 ELEVATED HEMOGLOBIN A1C: ICD-10-CM

## 2023-12-29 DIAGNOSIS — R74.8 ELEVATED ALKALINE PHOSPHATASE LEVEL: Primary | ICD-10-CM

## 2023-12-29 DIAGNOSIS — R73.03 PREDIABETES: ICD-10-CM

## 2023-12-29 LAB
BASOPHILS # BLD: 0 K/MCL (ref 0–0.3)
BASOPHILS NFR BLD: 0 %
DEPRECATED RDW RBC: 45.1 FL (ref 39–50)
EOSINOPHIL # BLD: 0.6 K/MCL (ref 0–0.5)
EOSINOPHIL NFR BLD: 6 %
ERYTHROCYTE [DISTWIDTH] IN BLOOD: 16 % (ref 11–15)
FERRITIN SERPL-MCNC: 25 NG/ML (ref 8–252)
HCT VFR BLD CALC: 38.9 % (ref 36–46.5)
HGB BLD-MCNC: 11.9 G/DL (ref 12–15.5)
IMM GRANULOCYTES # BLD AUTO: 0 K/MCL (ref 0–0.2)
IMM GRANULOCYTES # BLD: 0 %
IRON SATN MFR SERPL: 8 % (ref 15–45)
IRON SERPL-MCNC: 31 MCG/DL (ref 50–170)
LYMPHOCYTES # BLD: 2.3 K/MCL (ref 1–4.8)
LYMPHOCYTES NFR BLD: 22 %
MCH RBC QN AUTO: 23.8 PG (ref 26–34)
MCHC RBC AUTO-ENTMCNC: 30.6 G/DL (ref 32–36.5)
MCV RBC AUTO: 78 FL (ref 78–100)
MONOCYTES # BLD: 0.6 K/MCL (ref 0.3–0.9)
MONOCYTES NFR BLD: 6 %
NEUTROPHILS # BLD: 6.7 K/MCL (ref 1.8–7.7)
NEUTROPHILS NFR BLD: 66 %
NRBC BLD MANUAL-RTO: 0 /100 WBC
PLAT MORPH BLD: NORMAL
PLATELET # BLD AUTO: 308 K/MCL (ref 140–450)
RBC # BLD: 4.99 MIL/MCL (ref 4–5.2)
RBC MORPH BLD: NORMAL
TIBC SERPL-MCNC: 409 MCG/DL (ref 250–450)
WBC # BLD: 10.3 K/MCL (ref 4.2–11)
WBC MORPH BLD: NORMAL

## 2023-12-29 PROCEDURE — 99000 SPECIMEN HANDLING OFFICE-LAB: CPT | Performed by: INTERNAL MEDICINE

## 2023-12-29 PROCEDURE — 82728 ASSAY OF FERRITIN: CPT | Performed by: CLINICAL MEDICAL LABORATORY

## 2023-12-29 PROCEDURE — 83540 ASSAY OF IRON: CPT | Performed by: CLINICAL MEDICAL LABORATORY

## 2023-12-29 PROCEDURE — 86381 MITOCHONDRIAL ANTIBODY EACH: CPT | Performed by: CLINICAL MEDICAL LABORATORY

## 2023-12-29 PROCEDURE — 85025 COMPLETE CBC W/AUTO DIFF WBC: CPT | Performed by: CLINICAL MEDICAL LABORATORY

## 2023-12-29 PROCEDURE — 83550 IRON BINDING TEST: CPT | Performed by: CLINICAL MEDICAL LABORATORY

## 2023-12-29 PROCEDURE — 83036 HEMOGLOBIN GLYCOSYLATED A1C: CPT | Performed by: CLINICAL MEDICAL LABORATORY

## 2023-12-31 LAB — MITOCHONDRIA M2 IGG SER-ACNC: 6.5 UNITS

## 2024-01-02 ENCOUNTER — TELEPHONE (OUTPATIENT)
Dept: BEHAVIORAL HEALTH | Age: 34
End: 2024-01-02

## 2024-01-03 LAB — HBA1C MFR BLD: 5.3 % (ref 4.5–5.6)

## 2024-01-04 ENCOUNTER — TELEPHONE (OUTPATIENT)
Dept: INTERNAL MEDICINE | Age: 34
End: 2024-01-04

## 2024-01-11 ENCOUNTER — APPOINTMENT (OUTPATIENT)
Dept: BEHAVIORAL HEALTH | Age: 34
End: 2024-01-11

## 2024-01-15 ENCOUNTER — APPOINTMENT (OUTPATIENT)
Dept: BEHAVIORAL HEALTH | Age: 34
End: 2024-01-15

## 2024-01-15 DIAGNOSIS — F42.2 MIXED OBSESSIONAL THOUGHTS AND ACTS: ICD-10-CM

## 2024-01-15 DIAGNOSIS — F41.1 GAD (GENERALIZED ANXIETY DISORDER): ICD-10-CM

## 2024-01-15 DIAGNOSIS — F90.0 ATTENTION DEFICIT HYPERACTIVITY DISORDER (ADHD), PREDOMINANTLY INATTENTIVE TYPE: ICD-10-CM

## 2024-01-15 DIAGNOSIS — F31.9 BIPOLAR 1 DISORDER (CMD): Primary | ICD-10-CM

## 2024-01-15 PROCEDURE — 90834 PSYTX W PT 45 MINUTES: CPT | Performed by: COUNSELOR

## 2024-01-17 ENCOUNTER — E-ADVICE (OUTPATIENT)
Dept: INTERNAL MEDICINE | Age: 34
End: 2024-01-17

## 2024-01-18 DIAGNOSIS — E66.01 CLASS 3 SEVERE OBESITY WITH SERIOUS COMORBIDITY AND BODY MASS INDEX (BMI) OF 45.0 TO 49.9 IN ADULT, UNSPECIFIED OBESITY TYPE (H): Primary | ICD-10-CM

## 2024-01-18 DIAGNOSIS — E66.813 CLASS 3 SEVERE OBESITY WITH SERIOUS COMORBIDITY AND BODY MASS INDEX (BMI) OF 45.0 TO 49.9 IN ADULT, UNSPECIFIED OBESITY TYPE (H): Primary | ICD-10-CM

## 2024-01-18 NOTE — PROGRESS NOTES
"Estimated body mass index is 49.5 kg/m  as calculated from the following:    Height as of 2/13/19: 1.588 m (5' 2.5\").    Weight as of 3/31/21: 124.7 kg (275 lb).    "

## 2024-01-18 NOTE — PROGRESS NOTES
Medication Therapy Management (MTM) Encounter    ASSESSMENT:                            Medication Adherence/Access: no issues    Weight  management:  Would benefit from starting Zepbound for weight management. To get covered on insurance, needs to be prescribed through medication therapy management and ordered through our [place] specialty/mail order pharmacy. Provided counseling on Zepbound including dosing plan, side effects, and advised to watch a video on administration due to phone visit today. Patient would like to focus on increasing fruits and vegetables in diet.     Note: meets insurance criteria for GLP-1 RA due to BMI >40    PLAN:                            Start Zepbound 2.5 mg once weekly for 4 weeks. Then, increase to 5 mg once weekly   Continue to follow-up with primary care provider as planned    Follow-up: medication therapy management in 6 weeks to continue dose titration  Weight loss clinic scheduling line: [phone]    SUBJECTIVE/OBJECTIVE:                          Ben Upton is a 33 year old female called for an initial visit. She was referred to me from Marilynn Goldman NP.      Reason for visit: [employer] employee - GLP-1.    Allergies/ADRs: Reviewed in chart  Past Medical History: Reviewed in chart  Tobacco: She reports that she has never smoked. She has never used smokeless tobacco.  Alcohol: rarely  Lives in [place]. Receives all of her care [facility].  Medication Adherence/Access: no issues reported      Weight Management:   No current medications    Followed by her primary care provider, Dr. Harris, who recommends patients starts Zepbound.   Diet/Eating Habits: Patient reports 3 meals per day with 2 snacks. Breakfast: protein bar on work day. When more time: Premier pancakes and Kodiac waffles with two turkey sausage links. Lunch: premier protein drink. Dinner: carnitas burrito bowls, chicken salad, chicken cordon blue. Veggie tots. Protein rice. Would like to  "eat more fruits/vegetables.   Started dieting more a few weeks ago. Counting calories. One cheat day per week.  Exercise/Activity: Patient reports bowling every Thursday. Mall walking, new gym membership (has been 6 times so far, walks 2 miles on the treadmill).   Tried/Failed/Contraindicated Medications:   No hx pancreatitis/thyroid cancer  Metformin: diarrhea     Initial Consult Weight: 299 lbs, 13.2 oz  Weight: 136 kg  Height: 5' 2.5\"  BMI: 54.84  A1c: 5.3% - 12/29/23 per care everywhere    Today's Vitals: There were no vitals taken for this visit.  ----------------    I spent 27 minutes with this patient today. All changes were made via collaborative practice agreement with Marilynn Goldman CNP . A copy of the visit note was provided to the patient's provider(s).    A summary of these recommendations was sent via [employer].    Telemedicine Visit Details  Type of service:  Telephone visit  Start Time:  8:40 am  End Time:  9:07 am     Medication Therapy Recommendations  Obesity    Rationale: Untreated condition - Needs additional medication therapy - Indication   Recommendation: Start Medication - Zepbound 2.5 MG/0.5ML Soaj   Status: Accepted per CPA              "

## 2024-01-19 ENCOUNTER — VIRTUAL VISIT (OUTPATIENT)
Dept: CARDIOLOGY | Facility: CLINIC | Age: 34
End: 2024-01-19
Attending: PHYSICIAN ASSISTANT
Payer: COMMERCIAL

## 2024-01-19 DIAGNOSIS — E66.9 OBESITY: Primary | ICD-10-CM

## 2024-01-19 RX ORDER — ALBUTEROL SULFATE 0.83 MG/ML
2.5 SOLUTION RESPIRATORY (INHALATION) EVERY 6 HOURS PRN
COMMUNITY

## 2024-01-19 RX ORDER — FERROUS SULFATE 325(65) MG
1 TABLET ORAL DAILY
COMMUNITY
Start: 2023-10-20

## 2024-01-19 RX ORDER — ETONOGESTREL 68 MG/1
68 IMPLANT SUBCUTANEOUS ONCE
COMMUNITY

## 2024-01-19 RX ORDER — CHOLECALCIFEROL (VITAMIN D3) 50 MCG
2000 TABLET ORAL DAILY PRN
COMMUNITY
Start: 2023-03-09 | End: 2024-02-23

## 2024-01-19 ASSESSMENT — PAIN SCALES - GENERAL: PAINLEVEL: NO PAIN (0)

## 2024-01-19 NOTE — NURSING NOTE
Is the patient currently in the state of [place]? NO    Visit mode:TELEPHONE    If the visit is dropped, the patient can be reconnected by: TELEPHONE VISIT: Phone number:   No relevant phone numbers on file.       Will anyone else be joining the visit? NO  (If patient encounters technical issues they should call [phone]    How would you like to obtain your AVS? MyChart    Are changes needed to the allergy or medication list? No    Reason for visit: Consult    Muriel RUSSO

## 2024-01-19 NOTE — PATIENT INSTRUCTIONS
"Recommendations from today's MTM visit:                                                       Start Zepbound 2.5 mg once weekly for 4 weeks. Then, increase to 5 mg once weekly   Continue to follow-up with primary care provider as planned    [facility] - [place], [place] - [facility] [address] Phone: [phone]   Fax: [phone]        Follow-up: medication therapy management in 6 weeks to continue dose titration  Weight loss clinic scheduling line: [phone]    It was great speaking with you today.  I value your experience and would be very thankful for your time in providing feedback in our clinic survey. In the next few days, you may receive an email or text message from "[employer]" with a link to a survey related to your  clinical pharmacist.\"     To schedule another MTM appointment, please call the clinic directly or you may call the MTM scheduling line at [phone].    My Clinical Pharmacist's contact information:                                                      Please feel free to contact me with any questions or concerns you have.      Floresita Salinas, PharmD, AAHIVP  Medication Therapy Management Pharmacist     Zepbound (Tirzepatide)    Zepbound (Tirzepatide): is an injectable prescription medicine recently FDA approved for adults with obesity or overweight with an additional condition affected by their weight (type 2 diabetes, high blood pressure, high cholesterol, obstructive sleep apnea, etc). Zepbound contains the same active ingredient as what is in Mounjaro, an injectable FDA approved for Type 2 Diabtes. Zepbound is intended to be used along with dietary/behavioral changes and consistent exercise to improve assist with weight loss and other health improvement outcomes.     It is given as a shot once a week. It activates the body's receptors for GIP (glucose-dependent insulinotropic polypeptide) and GLP-1 (glucagon-like peptide-1) receptor, two naturally occurring hormones " that help tell the brain that you are full. It also works is by slowing down how quickly food leaves your stomach. What this means for you: You will start to feel fowler more quickly, notice portion changes and eat less often between meals. It is still important to maintain consistent eating schedule with meals +/- snacks and get in good hydration.      Dosing:  Initial dose: 2.5 mg injected subcutaneously once weekly for 4 weeks  Further dose changes can include: increase to 5 mg once weekly for 4 weeks, then 7.5 mg once weekly for 4 weeks, then 10 mg once weekly for 4 weeks then 12.5 mg once weekly for 4 weeks, then 15 mg (maximum dose) once weekly.    Dose changes are based on how you are tolerating the current dose, what benefits you are seeing at the current dose and if improvement in effectiveness of the drug is needed. The goal is to find the dose that works best for you with the least amount of side effects. You may find you reach this at a lower dose than the maximum dose.     Side effects: Patients are advised to eat more slowly and purposefully. Give yourself less portions. You may find after starting this medication you have a new point of fullness. It is also important to stay adequately hydrated on the medication to reduce risks of some of these side effects. Many of these side effects (nausea, diarrhea, etc) occurred during dose escalation but did improve over time with continuing the medication. If side effects are unmanageable, reach out to your prescribing provider.     The risk of pancreatitis (inflammation of the pancreas) has been associated with this type of medication, but is very rare.  If you have had pancreatitis in the past, this medication may not be for you. Please let us know about any past history of pancreas problems. If you experience persistent severe abdominal pain (sometimes radiating to the back potentially accompanied by vomiting and have a fever), stop the medication and  contact your provider immediately for assessment. They will do a blood test to check for pancreatitis.       How to Inject:   [email or web address]-[email or web address]-use    Storage:   Store your pen in the refrigerator. You may store your pen at room temperature for up to 21 days. If you store the pen at room temperature, do not return the pen to the refrigerator. Discard the pen if not used within 21 days after removing from the refrigerator.    For any questions or concerns please send a [employer] message to our team or call our weight management call center at [phone] during regular business hours.

## 2024-01-19 NOTE — Clinical Note
[place] patient - plans to continue following with primary care provider and mtm for weight mgmt.

## 2024-01-19 NOTE — LETTER
1/19/2024      RE: Ben Upton  [address] [address]       Dear Colleague,    Thank you for the opportunity to participate in the care of your patient, Ben Upton, at the [facility]. Please see a copy of my visit note below.    Medication Therapy Management (MTM) Encounter    ASSESSMENT:                            Medication Adherence/Access: no issues    Weight  management:  Would benefit from starting Zepbound for weight management. To get covered on insurance, needs to be prescribed through medication therapy management and ordered through our [place] specialty/mail order pharmacy. Provided counseling on Zepbound including dosing plan, side effects, and advised to watch a video on administration due to phone visit today. Patient would like to focus on increasing fruits and vegetables in diet.     PLAN:                            Start Zepbound 2.5 mg once weekly for 4 weeks. Then, increase to 5 mg once weekly   Continue to follow-up with primary care provider as planned    Follow-up: medication therapy management in 6 weeks to continue dose titration  Weight loss clinic scheduling line: [phone]    SUBJECTIVE/OBJECTIVE:                          Ben Upton is a 33 year old female called for an initial visit. She was referred to me from Marilynn Goldman NP.      Reason for visit: [employer] employee - GLP-1.    Allergies/ADRs: Reviewed in chart  Past Medical History: Reviewed in chart  Tobacco: She reports that she has never smoked. She has never used smokeless tobacco.  Alcohol: rarely  Lives in [place]. Receives all of her care through [facility].  Medication Adherence/Access: no issues reported      Weight Management:   No current medications    Followed by her primary care provider, Dr. Harris, who recommends patients starts Zepbound.   Diet/Eating Habits: Patient reports 3  "meals per day with 2 snacks. Breakfast: protein bar on work day. When more time: Premier pancakes and [employer]iac waffles with two turkey sausage links. Lunch: premier protein drink. Dinner: carnitas burrito bowls, chicken salad, chicken cordon blue. Veggie tots. Protein rice. Would like to eat more fruits/vegetables.   Started dieting more a few weeks ago. Counting calories. One cheat day per week.  Exercise/Activity: Patient reports bowling every Thursday. Mall walking, new gym membership (has been 6 times so far, walks 2 miles on the treadmill).   Tried/Failed/Contraindicated Medications:   No hx pancreatitis/thyroid cancer  Metformin: diarrhea     Initial Consult Weight: 299 lbs, 13.2 oz  Weight: 136 kg  Height: 5' 2.5\"  BMI: 54.84  A1c: 5.3% - 12/29/23 per care everywhere    Today's Vitals: There were no vitals taken for this visit.  ----------------    I spent 27 minutes with this patient today. All changes were made via collaborative practice agreement with Marilynn Goldman CNP . A copy of the visit note was provided to the patient's provider(s).    A summary of these recommendations was sent via [employer].    Telemedicine Visit Details  Type of service:  Telephone visit  Start Time:  8:40 am  End Time:  9:07 am     Medication Therapy Recommendations  Obesity    Rationale: Untreated condition - Needs additional medication therapy - Indication   Recommendation: Start Medication - Zepbound 2.5 MG/0.5ML Soaj   Status: Accepted per [facility]                Virtual Visit Details    Type of service:  Telephone Visit   Phone call duration: 27 minutes       Please do not hesitate to contact me if you have any questions/concerns.     Sincerely,     AMADEO CORTÉS RPH  "

## 2024-01-22 ENCOUNTER — E-ADVICE (OUTPATIENT)
Dept: BEHAVIORAL HEALTH | Age: 34
End: 2024-01-22

## 2024-01-22 DIAGNOSIS — F31.9 BIPOLAR 1 DISORDER (CMD): ICD-10-CM

## 2024-01-22 DIAGNOSIS — F42.2 MIXED OBSESSIONAL THOUGHTS AND ACTS: ICD-10-CM

## 2024-01-22 DIAGNOSIS — F41.1 GAD (GENERALIZED ANXIETY DISORDER): ICD-10-CM

## 2024-01-22 DIAGNOSIS — F90.0 ATTENTION DEFICIT HYPERACTIVITY DISORDER (ADHD), PREDOMINANTLY INATTENTIVE TYPE: ICD-10-CM

## 2024-01-22 RX ORDER — CLOMIPRAMINE HYDROCHLORIDE 50 MG/1
50 CAPSULE ORAL NIGHTLY
Qty: 30 CAPSULE | Refills: 5 | Status: SHIPPED | OUTPATIENT
Start: 2024-01-22

## 2024-01-22 RX ORDER — ATOMOXETINE 80 MG/1
80 CAPSULE ORAL DAILY
Qty: 30 CAPSULE | Refills: 5 | Status: SHIPPED | OUTPATIENT
Start: 2024-01-22

## 2024-01-22 RX ORDER — CLOZAPINE 100 MG/1
300 TABLET ORAL AT BEDTIME
Qty: 90 TABLET | Refills: 5 | Status: SHIPPED | OUTPATIENT
Start: 2024-01-22

## 2024-01-22 RX ORDER — PROPRANOLOL HYDROCHLORIDE 20 MG/1
20 TABLET ORAL 2 TIMES DAILY
Qty: 60 TABLET | Refills: 5 | Status: SHIPPED | OUTPATIENT
Start: 2024-01-22

## 2024-01-23 ENCOUNTER — TELEPHONE (OUTPATIENT)
Dept: INTERNAL MEDICINE | Age: 34
End: 2024-01-23

## 2024-01-23 DIAGNOSIS — E61.1 LOW IRON: ICD-10-CM

## 2024-01-24 ENCOUNTER — LAB SERVICES (OUTPATIENT)
Dept: LAB | Age: 34
End: 2024-01-24

## 2024-01-24 DIAGNOSIS — F41.1 GAD (GENERALIZED ANXIETY DISORDER): ICD-10-CM

## 2024-01-24 DIAGNOSIS — F31.9 BIPOLAR 1 DISORDER (CMD): ICD-10-CM

## 2024-01-24 LAB
BASOPHILS # BLD: 0.1 K/MCL (ref 0–0.3)
BASOPHILS NFR BLD: 1 %
DEPRECATED RDW RBC: 46.3 FL (ref 39–50)
EOSINOPHIL # BLD: 0 K/MCL (ref 0–0.5)
EOSINOPHIL NFR BLD: 0 %
ERYTHROCYTE [DISTWIDTH] IN BLOOD: 16 % (ref 11–15)
HCT VFR BLD CALC: 40 % (ref 36–46.5)
HGB BLD-MCNC: 12.1 G/DL (ref 12–15.5)
IMM GRANULOCYTES # BLD AUTO: 0 K/MCL (ref 0–0.2)
IMM GRANULOCYTES # BLD: 0 %
LYMPHOCYTES # BLD: 2.4 K/MCL (ref 1–4.8)
LYMPHOCYTES NFR BLD: 22 %
MCH RBC QN AUTO: 24.2 PG (ref 26–34)
MCHC RBC AUTO-ENTMCNC: 30.3 G/DL (ref 32–36.5)
MCV RBC AUTO: 80 FL (ref 78–100)
MONOCYTES # BLD: 0.6 K/MCL (ref 0.3–0.9)
MONOCYTES NFR BLD: 6 %
NEUTROPHILS # BLD: 7.6 K/MCL (ref 1.8–7.7)
NEUTROPHILS NFR BLD: 71 %
NRBC BLD MANUAL-RTO: 0 /100 WBC
PLATELET # BLD AUTO: 339 K/MCL (ref 140–450)
RBC # BLD: 5 MIL/MCL (ref 4–5.2)
WBC # BLD: 10.7 K/MCL (ref 4.2–11)

## 2024-01-24 PROCEDURE — 85025 COMPLETE CBC W/AUTO DIFF WBC: CPT | Performed by: CLINICAL MEDICAL LABORATORY

## 2024-01-24 PROCEDURE — 85025 COMPLETE CBC W/AUTO DIFF WBC: CPT | Performed by: INTERNAL MEDICINE

## 2024-01-24 PROCEDURE — 99000 SPECIMEN HANDLING OFFICE-LAB: CPT | Performed by: INTERNAL MEDICINE

## 2024-01-24 RX ORDER — FERROUS SULFATE 325(65) MG
325 TABLET ORAL 2 TIMES DAILY
Qty: 60 TABLET | Refills: 0 | Status: SHIPPED | COMMUNITY
Start: 2024-01-24

## 2024-01-26 ENCOUNTER — BEHAVIORAL HEALTH (OUTPATIENT)
Dept: BEHAVIORAL HEALTH | Age: 34
End: 2024-01-26

## 2024-01-26 ENCOUNTER — TELEPHONE (OUTPATIENT)
Dept: BEHAVIORAL HEALTH | Age: 34
End: 2024-01-26

## 2024-01-26 DIAGNOSIS — G24.01 TARDIVE DYSKINESIA: ICD-10-CM

## 2024-01-26 DIAGNOSIS — F31.9 BIPOLAR 1 DISORDER (CMD): Primary | Chronic | ICD-10-CM

## 2024-01-26 DIAGNOSIS — F90.0 ATTENTION DEFICIT HYPERACTIVITY DISORDER (ADHD), PREDOMINANTLY INATTENTIVE TYPE: ICD-10-CM

## 2024-01-26 DIAGNOSIS — F41.1 GAD (GENERALIZED ANXIETY DISORDER): ICD-10-CM

## 2024-01-30 ENCOUNTER — HOSPITAL ENCOUNTER (OUTPATIENT)
Dept: ULTRASOUND IMAGING | Age: 34
Discharge: HOME | End: 2024-01-30
Attending: INTERNAL MEDICINE

## 2024-01-30 ENCOUNTER — APPOINTMENT (OUTPATIENT)
Dept: BEHAVIORAL HEALTH | Age: 34
End: 2024-01-30

## 2024-01-30 DIAGNOSIS — R74.8 ELEVATED ALKALINE PHOSPHATASE LEVEL: ICD-10-CM

## 2024-02-06 ENCOUNTER — APPOINTMENT (OUTPATIENT)
Dept: BEHAVIORAL HEALTH | Age: 34
End: 2024-02-06

## 2024-02-06 ENCOUNTER — HOSPITAL ENCOUNTER (OUTPATIENT)
Dept: ULTRASOUND IMAGING | Age: 34
Discharge: HOME | End: 2024-02-06
Attending: INTERNAL MEDICINE

## 2024-02-06 DIAGNOSIS — F42.2 MIXED OBSESSIONAL THOUGHTS AND ACTS: ICD-10-CM

## 2024-02-06 DIAGNOSIS — F90.0 ATTENTION DEFICIT HYPERACTIVITY DISORDER (ADHD), PREDOMINANTLY INATTENTIVE TYPE: ICD-10-CM

## 2024-02-06 DIAGNOSIS — F31.9 BIPOLAR 1 DISORDER (CMD): Primary | ICD-10-CM

## 2024-02-06 DIAGNOSIS — F41.1 GAD (GENERALIZED ANXIETY DISORDER): ICD-10-CM

## 2024-02-06 PROCEDURE — 76705 ECHO EXAM OF ABDOMEN: CPT

## 2024-02-12 ENCOUNTER — TELEPHONE (OUTPATIENT)
Dept: OTHER | Age: 34
End: 2024-02-12

## 2024-02-19 ENCOUNTER — HOSPITAL ENCOUNTER (EMERGENCY)
Age: 34
Discharge: HOME | End: 2024-02-19
Attending: EMERGENCY MEDICINE

## 2024-02-19 ENCOUNTER — E-ADVICE (OUTPATIENT)
Dept: OTHER | Age: 34
End: 2024-02-19

## 2024-02-19 VITALS
HEIGHT: 62 IN | RESPIRATION RATE: 18 BRPM | HEART RATE: 104 BPM | OXYGEN SATURATION: 100 % | WEIGHT: 293 LBS | DIASTOLIC BLOOD PRESSURE: 84 MMHG | BODY MASS INDEX: 53.92 KG/M2 | SYSTOLIC BLOOD PRESSURE: 130 MMHG | TEMPERATURE: 98.7 F

## 2024-02-19 DIAGNOSIS — N92.0 MENORRHAGIA WITH REGULAR CYCLE: Primary | ICD-10-CM

## 2024-02-19 LAB
ALBUMIN SERPL-MCNC: 3.4 G/DL (ref 3.6–5.1)
ALBUMIN/GLOB SERPL: 0.9 {RATIO} (ref 1–2.4)
ALP SERPL-CCNC: 132 UNITS/L (ref 45–117)
ALT SERPL-CCNC: 27 UNITS/L
ANION GAP SERPL CALC-SCNC: 6 MMOL/L (ref 7–19)
AST SERPL-CCNC: 22 UNITS/L
BASOPHILS # BLD: 0 K/MCL (ref 0–0.3)
BASOPHILS NFR BLD: 0 %
BILIRUB SERPL-MCNC: 0.3 MG/DL (ref 0.2–1)
BUN SERPL-MCNC: 18 MG/DL (ref 6–20)
BUN/CREAT SERPL: 20 (ref 7–25)
CALCIUM SERPL-MCNC: 8.7 MG/DL (ref 8.4–10.2)
CHLORIDE SERPL-SCNC: 109 MMOL/L (ref 97–110)
CO2 SERPL-SCNC: 26 MMOL/L (ref 21–32)
CREAT SERPL-MCNC: 0.91 MG/DL (ref 0.51–0.95)
DEPRECATED RDW RBC: 44.6 FL (ref 39–50)
EGFRCR SERPLBLD CKD-EPI 2021: 86 ML/MIN/{1.73_M2}
EOSINOPHIL # BLD: 0 K/MCL (ref 0–0.5)
EOSINOPHIL NFR BLD: 0 %
ERYTHROCYTE [DISTWIDTH] IN BLOOD: 15.7 % (ref 11–15)
FASTING DURATION TIME PATIENT: ABNORMAL H
GLOBULIN SER-MCNC: 3.7 G/DL (ref 2–4)
GLUCOSE SERPL-MCNC: 90 MG/DL (ref 70–99)
HCT VFR BLD CALC: 36.1 % (ref 36–46.5)
HGB BLD-MCNC: 11.3 G/DL (ref 12–15.5)
IMM GRANULOCYTES # BLD AUTO: 0 K/MCL (ref 0–0.2)
IMM GRANULOCYTES # BLD: 0 %
LYMPHOCYTES # BLD: 1.8 K/MCL (ref 1–4.8)
LYMPHOCYTES NFR BLD: 18 %
MCH RBC QN AUTO: 24.5 PG (ref 26–34)
MCHC RBC AUTO-ENTMCNC: 31.3 G/DL (ref 32–36.5)
MCV RBC AUTO: 78.3 FL (ref 78–100)
MONOCYTES # BLD: 0.7 K/MCL (ref 0.3–0.9)
MONOCYTES NFR BLD: 7 %
NEUTROPHILS # BLD: 7.5 K/MCL (ref 1.8–7.7)
NEUTROPHILS NFR BLD: 75 %
NRBC BLD MANUAL-RTO: 0 /100 WBC
PLATELET # BLD AUTO: 277 K/MCL (ref 140–450)
POTASSIUM SERPL-SCNC: 3.6 MMOL/L (ref 3.4–5.1)
PROT SERPL-MCNC: 7.1 G/DL (ref 6.4–8.2)
RBC # BLD: 4.61 MIL/MCL (ref 4–5.2)
SODIUM SERPL-SCNC: 137 MMOL/L (ref 135–145)
WBC # BLD: 10.1 K/MCL (ref 4.2–11)

## 2024-02-19 PROCEDURE — 10002803 HB RX 637: Performed by: EMERGENCY MEDICINE

## 2024-02-19 PROCEDURE — 80053 COMPREHEN METABOLIC PANEL: CPT | Performed by: EMERGENCY MEDICINE

## 2024-02-19 PROCEDURE — 10002800 HB RX 250 W HCPCS: Performed by: EMERGENCY MEDICINE

## 2024-02-19 PROCEDURE — 36415 COLL VENOUS BLD VENIPUNCTURE: CPT

## 2024-02-19 PROCEDURE — 96374 THER/PROPH/DIAG INJ IV PUSH: CPT

## 2024-02-19 PROCEDURE — 96361 HYDRATE IV INFUSION ADD-ON: CPT

## 2024-02-19 PROCEDURE — 99284 EMERGENCY DEPT VISIT MOD MDM: CPT

## 2024-02-19 PROCEDURE — 85025 COMPLETE CBC W/AUTO DIFF WBC: CPT | Performed by: EMERGENCY MEDICINE

## 2024-02-19 PROCEDURE — 10002807 HB RX 258: Performed by: EMERGENCY MEDICINE

## 2024-02-19 PROCEDURE — 96375 TX/PRO/DX INJ NEW DRUG ADDON: CPT

## 2024-02-19 RX ORDER — TRANEXAMIC ACID 650 MG/1
1300 TABLET ORAL 3 TIMES DAILY
Qty: 30 TABLET | Refills: 0 | Status: SHIPPED | OUTPATIENT
Start: 2024-02-19 | End: 2024-02-19

## 2024-02-19 RX ORDER — TRANEXAMIC ACID 650 MG/1
1300 TABLET ORAL 3 TIMES DAILY
Qty: 30 TABLET | Refills: 0 | Status: SHIPPED | OUTPATIENT
Start: 2024-02-19 | End: 2024-02-24

## 2024-02-19 RX ORDER — KETOROLAC TROMETHAMINE 15 MG/ML
15 INJECTION, SOLUTION INTRAMUSCULAR; INTRAVENOUS ONCE
Status: COMPLETED | OUTPATIENT
Start: 2024-02-19 | End: 2024-02-19

## 2024-02-19 RX ORDER — TRANEXAMIC ACID 650 MG/1
650 TABLET ORAL ONCE
Status: COMPLETED | OUTPATIENT
Start: 2024-02-19 | End: 2024-02-19

## 2024-02-19 RX ORDER — METOCLOPRAMIDE HYDROCHLORIDE 5 MG/ML
10 INJECTION INTRAMUSCULAR; INTRAVENOUS ONCE
Status: COMPLETED | OUTPATIENT
Start: 2024-02-19 | End: 2024-02-19

## 2024-02-19 RX ORDER — DIPHENHYDRAMINE HYDROCHLORIDE 50 MG/ML
50 INJECTION INTRAMUSCULAR; INTRAVENOUS ONCE
Status: COMPLETED | OUTPATIENT
Start: 2024-02-19 | End: 2024-02-19

## 2024-02-19 RX ADMIN — TRANEXAMIC ACID 650 MG: 650 TABLET ORAL at 20:16

## 2024-02-19 RX ADMIN — METOCLOPRAMIDE HYDROCHLORIDE 10 MG: 5 INJECTION INTRAMUSCULAR; INTRAVENOUS at 19:04

## 2024-02-19 RX ADMIN — KETOROLAC TROMETHAMINE 15 MG: 15 INJECTION, SOLUTION INTRAMUSCULAR; INTRAVENOUS at 19:04

## 2024-02-19 RX ADMIN — DIPHENHYDRAMINE HYDROCHLORIDE 50 MG: 50 INJECTION, SOLUTION INTRAMUSCULAR; INTRAVENOUS at 19:04

## 2024-02-19 RX ADMIN — SODIUM CHLORIDE 1000 ML: 9 INJECTION, SOLUTION INTRAVENOUS at 19:04

## 2024-02-19 SDOH — SOCIAL STABILITY: SOCIAL INSECURITY: HOW OFTEN DOES ANYONE, INCLUDING FAMILY AND FRIENDS, INSULT OR TALK DOWN TO YOU?: NEVER

## 2024-02-19 SDOH — SOCIAL STABILITY: SOCIAL INSECURITY: HOW OFTEN DOES ANYONE, INCLUDING FAMILY AND FRIENDS, PHYSICALLY HURT YOU?: NEVER

## 2024-02-19 SDOH — SOCIAL STABILITY: SOCIAL INSECURITY: HOW OFTEN DOES ANYONE, INCLUDING FAMILY AND FRIENDS, SCREAM OR CURSE AT YOU?: NEVER

## 2024-02-19 SDOH — SOCIAL STABILITY: SOCIAL INSECURITY: HOW OFTEN DOES ANYONE, INCLUDING FAMILY AND FRIENDS, THREATEN YOU WITH HARM?: NEVER

## 2024-02-19 ASSESSMENT — PAIN SCALES - GENERAL
PAINLEVEL_OUTOF10: 4
PAINLEVEL_OUTOF10: 0

## 2024-02-23 ENCOUNTER — VIRTUAL VISIT (OUTPATIENT)
Dept: CARDIOLOGY | Facility: CLINIC | Age: 34
End: 2024-02-23
Attending: NURSE PRACTITIONER
Payer: COMMERCIAL

## 2024-02-23 VITALS — BODY MASS INDEX: 54.45 KG/M2 | WEIGHT: 293 LBS

## 2024-02-23 DIAGNOSIS — D50.0 IRON DEFICIENCY ANEMIA DUE TO CHRONIC BLOOD LOSS: ICD-10-CM

## 2024-02-23 DIAGNOSIS — R52 PAIN: ICD-10-CM

## 2024-02-23 DIAGNOSIS — F41.9 ANXIETY: ICD-10-CM

## 2024-02-23 DIAGNOSIS — F90.9 ATTENTION DEFICIT HYPERACTIVITY DISORDER (ADHD), UNSPECIFIED ADHD TYPE: ICD-10-CM

## 2024-02-23 DIAGNOSIS — F31.9 BIPOLAR 1 DISORDER (H): ICD-10-CM

## 2024-02-23 DIAGNOSIS — E66.9 OBESITY: Primary | ICD-10-CM

## 2024-02-23 NOTE — PATIENT INSTRUCTIONS
"Recommendations from today's MTM visit:                                                      Increase Zepbound to 5 mg once weekly starting today   Start taking ferrous sulfate 325 mg once daily     Follow-up: mychart check-in - 3 weeks. Primary care provider in 1 month     It was great speaking with you today.  I value your experience and would be very thankful for your time in providing feedback in our clinic survey. In the next few days, you may receive an email or text message from [employer] with a link to a survey related to your  clinical pharmacist.\"     To schedule another MTM appointment, please call the clinic directly or you may call the MTM scheduling line at [phone].    My Clinical Pharmacist's contact information:                                                      Please feel free to contact me with any questions or concerns you have.      Floresita Salinas, PharmD, AAHIVP  Medication Therapy Management Pharmacist      "

## 2024-02-23 NOTE — NURSING NOTE
Is the patient currently in the state of [place]? YES    Visit mode:TELEPHONE    If the visit is dropped, the patient can be reconnected by: TELEPHONE VISIT: Phone number:   Telephone Information:   Mobile [phone]       Will anyone else be joining the visit? NO  (If patient encounters technical issues they should call [phone])    How would you like to obtain your AVS? MyChart    Are changes needed to the allergy or medication list? No    Reason for visit: RECHSAGE RUSSO

## 2024-02-23 NOTE — PROGRESS NOTES
Medication Therapy Management (MTM) Encounter    ASSESSMENT:                            Medication Adherence/Access: No issues identified    Obesity   Tolerating Zepbound well without side effects. Weight loss progressing. Okay to increase dose.     Bipolar/anxiety/ADHD:   Follow psych plan of care    IUD/Iron deficiency anemia:   Would benefit from taking iron daily     Pain:   stable    PLAN:                            Increase Zepbound to 5 mg once weekly starting today   Start taking ferrous sulfate 325 mg once daily     Follow-up: mychart check-in - 3 weeks. Primary care provider in 1 month     SUBJECTIVE/OBJECTIVE:                          Ben Upton is a 33 year old female called for a follow-up visit from 1/19/24.       Reason for visit: Zepbound follow-up.    Allergies/ADRs: Reviewed in chart  Past Medical History: Reviewed in chart  Tobacco: She reports that she has never smoked. She has never used smokeless tobacco.  Alcohol: rarely  Lives in [place]. Receives all of her care through [facility].  Medication Adherence/Access: no issues reported    Obesity   Zepbound 2.5 mg once weekly - plans to increase to 5 mg dose today.     Followed by her primary care provider, Dr. Harris, who recommended patient starts Zepbound. Has been eating less and getting full faster. Still having cravings. No side effects.   Diet/Eating Habits: Patient reports 3 meals per day with 1 snack. Breakfast: [employer] protein bar on work day. Lunch: protein shake or will eat something with 300 or less calories. Dinner: healthy dinner. Maybe a snack. Meal examples: carnitas burrito bowls, chicken salad, chicken cordon blue. Veggie tots. Protein rice. Counting calories. One cheat day per week.  Exercise/Activity: Patient reports bowling every Thursday.  Decreased activity over the last month but likes to walk at [place]. Got a new gym membership beginning of the year but hasn't been going lately.  "  Tried/Failed/Contraindicated Medications:   No hx pancreatitis/thyroid cancer  Metformin: diarrhea      Initial Consult Weight: 309.5 lbs - will update this weight on previous note  Weight: down about 7 lbs since last visit  A1c: 5.3% - 12/29/23 per care everywhere     Wt Readings from Last 4 Encounters:   02/23/24 137.2 kg (302 lb 8 oz)   02/28/18 117 kg (258 lb)     Estimated body mass index is 54.45 kg/m  as calculated from the following:    Height as of 2/13/19: 1.588 m (5' 2.5\").    Weight as of this encounter: 137.2 kg (302 lb 8 oz).    Bipolar/anxiety/ADHD:   Clozapine 300 mg once daily   Clomipramine 25 mg daily   Atomoxetine 80 mg once daily   Propranolol 20 mg 2 times daily   Follows with psychiatry   Mood is stable. Denies constipation. Plans to start valbenazine for tardive dyskinesia soon.     IUD/Iron deficiency anemia:   Ferrous sulfate once daily - forget to take. Denies GI upset when she takes it.  Recent heavy bleeding episode that needed to be treated with Lysteda 650 mg 3 times daily x 5 days  Has IUD in     Pain:   Acetaminophen with caffeine - headaches when she was sick recently. This helped.   Not taking ibuprofen right now    Today's Vitals: Wt 137.2 kg (302 lb 8 oz)   BMI 54.45 kg/m    ----------------  Post Discharge Medication Reconciliation Status: discharge medications reconciled, continue medications without change.    I spent 20 minutes with this patient today. All changes were made via collaborative practice agreement with Marilynn Goldman CNP . A copy of the visit note was provided to the patient's provider(s).    A summary of these recommendations was sent via [employer].    Telemedicine Visit Details  Type of service:  Telephone visit  Start Time:  9:05 am  End Time: 9:25 am     Medication Therapy Recommendations  Iron deficiency anemia due to chronic blood loss    Current Medication: ferrous sulfate (FEROSUL) 325 (65 Fe) MG tablet   Rationale: Patient forgets to take - Adherence - " Adherence   Recommendation: Provide Education   Status: Patient Agreed - Adherence/Education

## 2024-02-23 NOTE — Clinical Note
Jamil Subramanian - [employer] employee. Lives in [facility]. Followed by primary care provider for weight mgmt. Do you want to see this patient? It's hard for her to get to [place]... Floresita

## 2024-02-23 NOTE — LETTER
2/23/2024      RE: Ben Upton  [address] [facility] [address]       Dear Colleague,    Thank you for the opportunity to participate in the care of your patient, Ben Upton, at the [facility]. Please see a copy of my visit note below.      Medication Therapy Management (MTM) Encounter    ASSESSMENT:                            Medication Adherence/Access: No issues identified    Obesity   Tolerating Zepbound well without side effects. Weight loss progressing. Okay to increase dose.     Bipolar/anxiety/ADHD:   Follow psych plan of care    IUD/Iron deficiency anemia:   Would benefit from taking iron daily     Pain:   stable    PLAN:                            Increase Zepbound to 5 mg once weekly starting today   Start taking ferrous sulfate 325 mg once daily     Follow-up: mychart check-in - 3 weeks. Primary care provider in 1 month     SUBJECTIVE/OBJECTIVE:                          Ben Upton is a 33 year old female called for a follow-up visit from 1/19/24.       Reason for visit: Zepbound follow-up.    Allergies/ADRs: Reviewed in chart  Past Medical History: Reviewed in chart  Tobacco: She reports that she has never smoked. She has never used smokeless tobacco.  Alcohol: rarely  Lives in [place]. Receives all of her care through [facility].  Medication Adherence/Access: no issues reported    Obesity  Zepbound 2.5 mg once weekly - plans to increase to 5 mg dose today.     Followed by her primary care provider, Dr. Harris, who recommended patient starts Zepbound. Has been eating less and getting full faster. Still having cravings. No side effects.   Diet/Eating Habits: Patient reports 3 meals per day with 1 snack. Breakfast: Vizcaino protein bar on work day. Lunch: protein shake or will eat something with 300 or less calories. Dinner: healthy dinner. Maybe a snack. Meal examples:  "carnitas burrito bowls, chicken salad, chicken cordon blue. Veggie tots. Protein rice. Counting calories. One cheat day per week.  Exercise/Activity: Patient reports bowling every Thursday.  Decreased activity over the last month but likes to walk at [employer]. Got a new gym membership beginning of the year but hasn't been going lately.   Tried/Failed/Contraindicated Medications:   No hx pancreatitis/thyroid cancer  Metformin: diarrhea      Initial Consult Weight: 309.5 lbs - will update this weight on previous note  Weight: down about 7 lbs since last visit  A1c: 5.3% - 12/29/23 per care everywhere     Wt Readings from Last 4 Encounters:   02/23/24 137.2 kg (302 lb 8 oz)   02/28/18 117 kg (258 lb)     Estimated body mass index is 54.45 kg/m  as calculated from the following:    Height as of 2/13/19: 1.588 m (5' 2.5\").    Weight as of this encounter: 137.2 kg (302 lb 8 oz).    Bipolar/anxiety/ADHD:   Clozapine 300 mg once daily   Clomipramine 25 mg daily   Atomoxetine 80 mg once daily   Propranolol 20 mg 2 times daily   Follows with psychiatry   Mood is stable. Denies constipation. Plans to start valbenazine for tardive dyskinesia soon.     IUD/Iron deficiency anemia:   Ferrous sulfate once daily - forget to take. Denies GI upset when she takes it.  Recent heavy bleeding episode that needed to be treated with Lysteda 650 mg 3 times daily x 5 days  Has IUD in     Pain:   Acetaminophen with caffeine - headaches when she was sick recently. This helped.   Not taking ibuprofen right now    Today's Vitals: Wt 137.2 kg (302 lb 8 oz)   BMI 54.45 kg/m    ----------------  Post Discharge Medication Reconciliation Status: discharge medications reconciled, continue medications without change.    I spent 20 minutes with this patient today. All changes were made via collaborative practice agreement with Marilynn Goldman CNP . A copy of the visit note was provided to the patient's provider(s).    A summary of these recommendations was " sent via [employer].    Telemedicine Visit Details  Type of service:  Telephone visit  Start Time:  9:05 am  End Time: 9:25 am     Medication Therapy Recommendations  Iron deficiency anemia due to chronic blood loss    Current Medication: ferrous sulfate (FEROSUL) 325 (65 Fe) MG tablet   Rationale: Patient forgets to take - Adherence - Adherence   Recommendation: Provide Education   Status: Patient Agreed - Adherence/Education                Please do not hesitate to contact me if you have any questions/concerns.     Sincerely,     AMADEO CORTÉS RPH

## 2024-02-27 ENCOUNTER — TELEPHONE (OUTPATIENT)
Dept: BEHAVIORAL HEALTH | Age: 34
End: 2024-02-27

## 2024-02-27 ENCOUNTER — LAB SERVICES (OUTPATIENT)
Dept: LAB | Age: 34
End: 2024-02-27

## 2024-02-27 DIAGNOSIS — E61.1 LOW IRON: ICD-10-CM

## 2024-02-27 DIAGNOSIS — F41.1 GAD (GENERALIZED ANXIETY DISORDER): ICD-10-CM

## 2024-02-27 DIAGNOSIS — F31.9 BIPOLAR 1 DISORDER (CMD): ICD-10-CM

## 2024-02-27 LAB
BASOPHILS # BLD: 0.1 K/MCL (ref 0–0.3)
BASOPHILS NFR BLD: 1 %
DEPRECATED RDW RBC: 46.1 FL (ref 39–50)
DOHLE BOD BLD QL SMEAR: PRESENT
ERYTHROCYTE [DISTWIDTH] IN BLOOD: 16.1 % (ref 11–15)
HCT VFR BLD CALC: 34.6 % (ref 36–46.5)
HGB BLD-MCNC: 10.6 G/DL (ref 12–15.5)
IRON SERPL-MCNC: 23 MCG/DL (ref 50–170)
LYMPHOCYTES # BLD: 3.5 K/MCL (ref 1–4.8)
LYMPHOCYTES NFR BLD: 27 %
MCH RBC QN AUTO: 24.1 PG (ref 26–34)
MCHC RBC AUTO-ENTMCNC: 30.6 G/DL (ref 32–36.5)
MCV RBC AUTO: 78.8 FL (ref 78–100)
METAMYELOCYTES NFR BLD: 1 % (ref 0–2)
MONOCYTES # BLD: 0.4 K/MCL (ref 0.3–0.9)
MONOCYTES NFR BLD: 3 %
NEUTROPHILS # BLD: 8.8 K/MCL (ref 1.8–7.7)
NEUTS SEG NFR BLD: 68 %
NRBC BLD MANUAL-RTO: 0 /100 WBC
PLAT MORPH BLD: NORMAL
PLATELET # BLD AUTO: 420 K/MCL (ref 140–450)
RBC # BLD: 4.39 MIL/MCL (ref 4–5.2)
RBC MORPH BLD: NORMAL
WBC # BLD: 12.9 K/MCL (ref 4.2–11)

## 2024-02-27 PROCEDURE — 36415 COLL VENOUS BLD VENIPUNCTURE: CPT | Performed by: INTERNAL MEDICINE

## 2024-02-27 PROCEDURE — 83540 ASSAY OF IRON: CPT | Performed by: INTERNAL MEDICINE

## 2024-02-27 PROCEDURE — 85027 COMPLETE CBC AUTOMATED: CPT | Performed by: INTERNAL MEDICINE

## 2024-02-28 ENCOUNTER — TELEPHONE (OUTPATIENT)
Dept: INTERNAL MEDICINE | Age: 34
End: 2024-02-28

## 2024-02-28 DIAGNOSIS — E61.1 LOW IRON: Primary | ICD-10-CM

## 2024-02-28 DIAGNOSIS — D64.9 ANEMIA, UNSPECIFIED TYPE: ICD-10-CM

## 2024-03-02 ENCOUNTER — HEALTH MAINTENANCE LETTER (OUTPATIENT)
Age: 34
End: 2024-03-02

## 2024-03-04 ENCOUNTER — APPOINTMENT (OUTPATIENT)
Dept: BEHAVIORAL HEALTH | Age: 34
End: 2024-03-04

## 2024-03-05 ENCOUNTER — APPOINTMENT (OUTPATIENT)
Dept: DERMATOLOGY | Age: 34
End: 2024-03-05

## 2024-03-13 ENCOUNTER — APPOINTMENT (OUTPATIENT)
Dept: LAB | Age: 34
End: 2024-03-13

## 2024-03-13 ENCOUNTER — BEHAVIORAL HEALTH (OUTPATIENT)
Dept: BEHAVIORAL HEALTH | Age: 34
End: 2024-03-13

## 2024-03-13 ENCOUNTER — APPOINTMENT (OUTPATIENT)
Dept: DERMATOLOGY | Age: 34
End: 2024-03-13
Attending: EMERGENCY MEDICINE

## 2024-03-13 DIAGNOSIS — F90.0 ATTENTION DEFICIT HYPERACTIVITY DISORDER (ADHD), PREDOMINANTLY INATTENTIVE TYPE: ICD-10-CM

## 2024-03-13 DIAGNOSIS — D64.9 ANEMIA, UNSPECIFIED TYPE: ICD-10-CM

## 2024-03-13 DIAGNOSIS — F31.9 BIPOLAR 1 DISORDER (CMD): Primary | ICD-10-CM

## 2024-03-13 DIAGNOSIS — Z00.00 ANNUAL PHYSICAL EXAM: ICD-10-CM

## 2024-03-13 DIAGNOSIS — E55.9 VITAMIN D DEFICIENCY: ICD-10-CM

## 2024-03-13 DIAGNOSIS — E61.1 LOW IRON: ICD-10-CM

## 2024-03-13 DIAGNOSIS — F41.1 GAD (GENERALIZED ANXIETY DISORDER): ICD-10-CM

## 2024-03-13 DIAGNOSIS — F42.2 MIXED OBSESSIONAL THOUGHTS AND ACTS: ICD-10-CM

## 2024-03-13 LAB
ANION GAP SERPL CALC-SCNC: 9 MMOL/L (ref 7–19)
BASOPHILS # BLD: 0.1 K/MCL (ref 0–0.3)
BASOPHILS NFR BLD: 1 %
BUN SERPL-MCNC: 15 MG/DL (ref 6–20)
BUN/CREAT SERPL: 20 (ref 7–25)
CALCIUM SERPL-MCNC: 8.8 MG/DL (ref 8.4–10.2)
CHLORIDE SERPL-SCNC: 110 MMOL/L (ref 97–110)
CHOLEST SERPL-MCNC: 191 MG/DL
CHOLEST/HDLC SERPL: 4.7 {RATIO}
CO2 SERPL-SCNC: 25 MMOL/L (ref 21–32)
CREAT SERPL-MCNC: 0.75 MG/DL (ref 0.51–0.95)
DEPRECATED RDW RBC: 43 FL (ref 39–50)
EGFRCR SERPLBLD CKD-EPI 2021: >90 ML/MIN/{1.73_M2}
EOSINOPHIL # BLD: 0 K/MCL (ref 0–0.5)
EOSINOPHIL NFR BLD: 0 %
ERYTHROCYTE [DISTWIDTH] IN BLOOD: 15.7 % (ref 11–15)
FASTING DURATION TIME PATIENT: 13 HOURS (ref 0–999)
GLUCOSE SERPL-MCNC: 97 MG/DL (ref 70–99)
HCT VFR BLD CALC: 34.8 % (ref 36–46.5)
HDLC SERPL-MCNC: 41 MG/DL
HGB BLD-MCNC: 10.7 G/DL (ref 12–15.5)
IMM GRANULOCYTES # BLD AUTO: 0 K/MCL (ref 0–0.2)
IMM GRANULOCYTES # BLD: 0 %
IRON SATN MFR SERPL: 7 % (ref 15–45)
IRON SERPL-MCNC: 25 MCG/DL (ref 50–170)
LDLC SERPL CALC-MCNC: 135 MG/DL
LYMPHOCYTES # BLD: 2.5 K/MCL (ref 1–4.8)
LYMPHOCYTES NFR BLD: 28 %
MCH RBC QN AUTO: 23.5 PG (ref 26–34)
MCHC RBC AUTO-ENTMCNC: 30.7 G/DL (ref 32–36.5)
MCV RBC AUTO: 76.5 FL (ref 78–100)
MONOCYTES # BLD: 0.5 K/MCL (ref 0.3–0.9)
MONOCYTES NFR BLD: 6 %
NEUTROPHILS # BLD: 5.8 K/MCL (ref 1.8–7.7)
NEUTROPHILS NFR BLD: 65 %
NONHDLC SERPL-MCNC: 150 MG/DL
NRBC BLD MANUAL-RTO: 0 /100 WBC
PLATELET # BLD AUTO: 284 K/MCL (ref 140–450)
POTASSIUM SERPL-SCNC: 4.5 MMOL/L (ref 3.4–5.1)
RBC # BLD: 4.55 MIL/MCL (ref 4–5.2)
SODIUM SERPL-SCNC: 139 MMOL/L (ref 135–145)
TIBC SERPL-MCNC: 360 MCG/DL (ref 250–450)
TRIGL SERPL-MCNC: 73 MG/DL
WBC # BLD: 8.9 K/MCL (ref 4.2–11)

## 2024-03-13 PROCEDURE — 83550 IRON BINDING TEST: CPT | Performed by: INTERNAL MEDICINE

## 2024-03-13 PROCEDURE — 85025 COMPLETE CBC W/AUTO DIFF WBC: CPT | Performed by: INTERNAL MEDICINE

## 2024-03-13 PROCEDURE — 82306 VITAMIN D 25 HYDROXY: CPT | Performed by: CLINICAL MEDICAL LABORATORY

## 2024-03-13 PROCEDURE — 83550 IRON BINDING TEST: CPT | Performed by: CLINICAL MEDICAL LABORATORY

## 2024-03-13 PROCEDURE — 83540 ASSAY OF IRON: CPT | Performed by: CLINICAL MEDICAL LABORATORY

## 2024-03-13 PROCEDURE — 83540 ASSAY OF IRON: CPT | Performed by: INTERNAL MEDICINE

## 2024-03-13 PROCEDURE — 80048 BASIC METABOLIC PNL TOTAL CA: CPT | Performed by: CLINICAL MEDICAL LABORATORY

## 2024-03-13 PROCEDURE — 80048 BASIC METABOLIC PNL TOTAL CA: CPT | Performed by: INTERNAL MEDICINE

## 2024-03-13 PROCEDURE — 80061 LIPID PANEL: CPT | Performed by: CLINICAL MEDICAL LABORATORY

## 2024-03-13 PROCEDURE — 85025 COMPLETE CBC W/AUTO DIFF WBC: CPT | Performed by: CLINICAL MEDICAL LABORATORY

## 2024-03-13 PROCEDURE — 80061 LIPID PANEL: CPT | Performed by: INTERNAL MEDICINE

## 2024-03-14 LAB — 25(OH)D3+25(OH)D2 SERPL-MCNC: 24.6 NG/ML (ref 30–100)

## 2024-03-20 ENCOUNTER — APPOINTMENT (OUTPATIENT)
Dept: INTERNAL MEDICINE | Age: 34
End: 2024-03-20

## 2024-03-20 VITALS
HEART RATE: 108 BPM | SYSTOLIC BLOOD PRESSURE: 126 MMHG | HEIGHT: 62 IN | OXYGEN SATURATION: 97 % | DIASTOLIC BLOOD PRESSURE: 72 MMHG | WEIGHT: 293 LBS | BODY MASS INDEX: 53.92 KG/M2

## 2024-03-20 DIAGNOSIS — F43.10 PTSD (POST-TRAUMATIC STRESS DISORDER): ICD-10-CM

## 2024-03-20 DIAGNOSIS — E55.9 VITAMIN D DEFICIENCY: ICD-10-CM

## 2024-03-20 DIAGNOSIS — F90.0 ATTENTION DEFICIT HYPERACTIVITY DISORDER (ADHD), PREDOMINANTLY INATTENTIVE TYPE: ICD-10-CM

## 2024-03-20 DIAGNOSIS — E66.01 CLASS 3 SEVERE OBESITY WITH SERIOUS COMORBIDITY AND BODY MASS INDEX (BMI) OF 50.0 TO 59.9 IN ADULT, UNSPECIFIED OBESITY TYPE (CMD): ICD-10-CM

## 2024-03-20 DIAGNOSIS — K76.0 FATTY LIVER: ICD-10-CM

## 2024-03-20 DIAGNOSIS — K80.20 GALLSTONES: ICD-10-CM

## 2024-03-20 DIAGNOSIS — Z00.00 ANNUAL PHYSICAL EXAM: Primary | ICD-10-CM

## 2024-03-20 DIAGNOSIS — F41.1 GAD (GENERALIZED ANXIETY DISORDER): ICD-10-CM

## 2024-03-20 DIAGNOSIS — D50.8 OTHER IRON DEFICIENCY ANEMIA: ICD-10-CM

## 2024-03-20 DIAGNOSIS — Z13.220 LIPID SCREENING: ICD-10-CM

## 2024-03-20 DIAGNOSIS — J45.20 MILD INTERMITTENT ASTHMA WITHOUT COMPLICATION: ICD-10-CM

## 2024-03-20 PROBLEM — E66.813 CLASS 3 SEVERE OBESITY WITH SERIOUS COMORBIDITY AND BODY MASS INDEX (BMI) OF 50.0 TO 59.9 IN ADULT (CMD): Status: ACTIVE | Noted: 2024-03-20

## 2024-03-20 PROBLEM — D50.9 IRON DEFICIENCY ANEMIA: Status: ACTIVE | Noted: 2024-03-20

## 2024-03-20 PROCEDURE — 99395 PREV VISIT EST AGE 18-39: CPT | Performed by: INTERNAL MEDICINE

## 2024-03-21 RX ORDER — EPINEPHRINE 0.3 MG/.3ML
0.3 INJECTION SUBCUTANEOUS
Qty: 2 EACH | Refills: 1 | Status: SHIPPED | OUTPATIENT
Start: 2024-03-21

## 2024-03-25 ENCOUNTER — BEHAVIORAL HEALTH (OUTPATIENT)
Dept: BEHAVIORAL HEALTH | Age: 34
End: 2024-03-25

## 2024-03-25 DIAGNOSIS — F41.1 GAD (GENERALIZED ANXIETY DISORDER): ICD-10-CM

## 2024-03-25 DIAGNOSIS — F31.9 BIPOLAR 1 DISORDER (CMD): Primary | ICD-10-CM

## 2024-03-25 DIAGNOSIS — F90.0 ATTENTION DEFICIT HYPERACTIVITY DISORDER (ADHD), PREDOMINANTLY INATTENTIVE TYPE: ICD-10-CM

## 2024-03-25 DIAGNOSIS — F42.2 MIXED OBSESSIONAL THOUGHTS AND ACTS: ICD-10-CM

## 2024-04-01 ENCOUNTER — BEHAVIORAL HEALTH (OUTPATIENT)
Dept: BEHAVIORAL HEALTH | Age: 34
End: 2024-04-01

## 2024-04-01 DIAGNOSIS — F31.9 BIPOLAR 1 DISORDER (CMD): Primary | ICD-10-CM

## 2024-04-01 DIAGNOSIS — F90.0 ATTENTION DEFICIT HYPERACTIVITY DISORDER (ADHD), PREDOMINANTLY INATTENTIVE TYPE: ICD-10-CM

## 2024-04-01 DIAGNOSIS — F42.2 MIXED OBSESSIONAL THOUGHTS AND ACTS: ICD-10-CM

## 2024-04-01 DIAGNOSIS — F41.1 GAD (GENERALIZED ANXIETY DISORDER): ICD-10-CM

## 2024-04-03 ENCOUNTER — LAB SERVICES (OUTPATIENT)
Dept: LAB | Age: 34
End: 2024-04-03

## 2024-04-03 ENCOUNTER — APPOINTMENT (OUTPATIENT)
Dept: LAB | Age: 34
End: 2024-04-03

## 2024-04-03 DIAGNOSIS — F41.1 GAD (GENERALIZED ANXIETY DISORDER): ICD-10-CM

## 2024-04-03 DIAGNOSIS — F31.9 BIPOLAR 1 DISORDER (CMD): ICD-10-CM

## 2024-04-03 LAB
DEPRECATED RDW RBC: 42.3 FL (ref 39–50)
EOSINOPHIL # BLD: 0.3 K/MCL (ref 0–0.5)
EOSINOPHIL NFR BLD: 3 %
ERYTHROCYTE [DISTWIDTH] IN BLOOD: 15.9 % (ref 11–15)
HCT VFR BLD CALC: 31.8 % (ref 36–46.5)
HGB BLD-MCNC: 9.9 G/DL (ref 12–15.5)
HYPOCHROMIA BLD QL SMEAR: ABNORMAL
LYMPHOCYTES # BLD: 2.6 K/MCL (ref 1–4.8)
LYMPHOCYTES NFR BLD: 23 %
MCH RBC QN AUTO: 23 PG (ref 26–34)
MCHC RBC AUTO-ENTMCNC: 31.1 G/DL (ref 32–36.5)
MCV RBC AUTO: 74 FL (ref 78–100)
MONOCYTES # BLD: 1.2 K/MCL (ref 0.3–0.9)
MONOCYTES NFR BLD: 11 %
NEUTROPHILS # BLD: 6.8 K/MCL (ref 1.8–7.7)
NEUTS SEG NFR BLD: 62 %
NRBC BLD MANUAL-RTO: 0 /100 WBC
PLAT MORPH BLD: NORMAL
PLATELET # BLD AUTO: 413 K/MCL (ref 140–450)
RBC # BLD: 4.3 MIL/MCL (ref 4–5.2)
VARIANT LYMPHS NFR BLD: 1 % (ref 0–5)
WBC # BLD: 11 K/MCL (ref 4.2–11)
WBC MORPH BLD: NORMAL

## 2024-04-03 PROCEDURE — 99000 SPECIMEN HANDLING OFFICE-LAB: CPT | Performed by: INTERNAL MEDICINE

## 2024-04-03 PROCEDURE — 85027 COMPLETE CBC AUTOMATED: CPT | Performed by: CLINICAL MEDICAL LABORATORY

## 2024-04-04 ENCOUNTER — APPOINTMENT (OUTPATIENT)
Dept: LAB | Age: 34
End: 2024-04-04

## 2024-04-04 ENCOUNTER — TELEPHONE (OUTPATIENT)
Dept: BEHAVIORAL HEALTH | Age: 34
End: 2024-04-04

## 2024-04-06 ENCOUNTER — E-ADVICE (OUTPATIENT)
Dept: INTERNAL MEDICINE | Age: 34
End: 2024-04-06

## 2024-04-08 ENCOUNTER — TELEPHONE (OUTPATIENT)
Dept: PHARMACY | Facility: CLINIC | Age: 34
End: 2024-04-08
Payer: COMMERCIAL

## 2024-04-08 ENCOUNTER — BEHAVIORAL HEALTH (OUTPATIENT)
Dept: BEHAVIORAL HEALTH | Age: 34
End: 2024-04-08

## 2024-04-08 DIAGNOSIS — F31.9 BIPOLAR 1 DISORDER (CMD): Primary | ICD-10-CM

## 2024-04-08 DIAGNOSIS — F42.2 MIXED OBSESSIONAL THOUGHTS AND ACTS: ICD-10-CM

## 2024-04-08 DIAGNOSIS — F41.1 GAD (GENERALIZED ANXIETY DISORDER): ICD-10-CM

## 2024-04-08 DIAGNOSIS — F90.0 ATTENTION DEFICIT HYPERACTIVITY DISORDER (ADHD), PREDOMINANTLY INATTENTIVE TYPE: ICD-10-CM

## 2024-04-08 DIAGNOSIS — E66.813 CLASS 3 SEVERE OBESITY DUE TO EXCESS CALORIES WITH SERIOUS COMORBIDITY AND BODY MASS INDEX (BMI) OF 50.0 TO 59.9 IN ADULT (H): Primary | ICD-10-CM

## 2024-04-08 DIAGNOSIS — E66.01 CLASS 3 SEVERE OBESITY DUE TO EXCESS CALORIES WITH SERIOUS COMORBIDITY AND BODY MASS INDEX (BMI) OF 50.0 TO 59.9 IN ADULT (H): Primary | ICD-10-CM

## 2024-04-08 NOTE — TELEPHONE ENCOUNTER
Good afternoon Floresita,    This patient called and was asking if you can put in an order for her Zepbound prescription. The patient stated that she was able to meet with a provider in [place]. Patient requested a callback at your earliest convenience at [phone].     Thank you,  Nikkie Disla [facility]

## 2024-04-09 ENCOUNTER — TELEPHONE (OUTPATIENT)
Dept: ENDOCRINOLOGY | Facility: CLINIC | Age: 34
End: 2024-04-09
Payer: COMMERCIAL

## 2024-04-09 ENCOUNTER — TELEPHONE (OUTPATIENT)
Dept: OTHER | Age: 34
End: 2024-04-09

## 2024-04-09 DIAGNOSIS — E66.01 CLASS 3 SEVERE OBESITY WITH SERIOUS COMORBIDITY AND BODY MASS INDEX (BMI) OF 45.0 TO 49.9 IN ADULT, UNSPECIFIED OBESITY TYPE (H): Primary | ICD-10-CM

## 2024-04-09 DIAGNOSIS — E66.813 CLASS 3 SEVERE OBESITY WITH SERIOUS COMORBIDITY AND BODY MASS INDEX (BMI) OF 45.0 TO 49.9 IN ADULT, UNSPECIFIED OBESITY TYPE (H): Primary | ICD-10-CM

## 2024-04-09 NOTE — TELEPHONE ENCOUNTER
Called patient to discuss plan moving forward. Has been off Zepbound for about a month due to losing insurance. Was tolerating it well and found it effective. Lost about 15 lbs. Zepbound on backorder. Will plan to switch to Wegovy and follow-up in 6 weeks.     Floresita Salinas, PharmD, AAHIVP  Medication Therapy Management Pharmacist

## 2024-04-09 NOTE — TELEPHONE ENCOUNTER
Prior Authorization Retail Medication Request    Medication/Dose: Wegovy  Diagnosis and ICD code (if different than what is on RX): E66.01  New/renewal/insurance change PA/secondary ins. PA:  Previously Tried and Failed:  zepbound - not available      Insurance   Primary: [facility]  Insurance ID: [id number]     Secondary (if applicable):[facility]  Insurance ID:  [id number]

## 2024-04-09 NOTE — TELEPHONE ENCOUNTER
PA Initiation    Medication: WEGOVY 0.25 MG/0.5ML SC SOAJ  Insurance Company: [employer] - Phone [phone] Fax [phone]  Pharmacy Filling the Rx:    Filling Pharmacy Phone:    Filling Pharmacy Fax:    Start Date: 4/9/2024

## 2024-04-10 NOTE — TELEPHONE ENCOUNTER
Prior Authorization Approval    Medication: WEGOVY 0.25 MG/0.5ML SC SOAJ  Authorization Effective Date: 4/10/2024  Authorization Expiration Date: 10/30/2024  Approved Dose/Quantity: 2  Reference #: [id number]   Insurance Company: [employer] - Phone [phone] Fax [phone]  Expected CoPay: $    CoPay Card Available:      Financial Assistance Needed:    Which Pharmacy is filling the prescription:    Pharmacy Notified: yes  Patient Notified: yes

## 2024-04-22 ENCOUNTER — APPOINTMENT (OUTPATIENT)
Dept: BEHAVIORAL HEALTH | Age: 34
End: 2024-04-22

## 2024-04-22 DIAGNOSIS — F42.2 MIXED OBSESSIONAL THOUGHTS AND ACTS: ICD-10-CM

## 2024-04-22 DIAGNOSIS — F41.1 GAD (GENERALIZED ANXIETY DISORDER): ICD-10-CM

## 2024-04-22 DIAGNOSIS — F31.9 BIPOLAR 1 DISORDER (CMD): Primary | ICD-10-CM

## 2024-04-22 DIAGNOSIS — F90.0 ATTENTION DEFICIT HYPERACTIVITY DISORDER (ADHD), PREDOMINANTLY INATTENTIVE TYPE: ICD-10-CM

## 2024-04-26 ENCOUNTER — APPOINTMENT (OUTPATIENT)
Dept: BEHAVIORAL HEALTH | Age: 34
End: 2024-04-26

## 2024-04-26 ENCOUNTER — LAB SERVICES (OUTPATIENT)
Dept: LAB | Age: 34
End: 2024-04-26

## 2024-04-26 ENCOUNTER — TELEPHONE (OUTPATIENT)
Dept: BEHAVIORAL HEALTH | Age: 34
End: 2024-04-26

## 2024-04-26 DIAGNOSIS — F41.1 GAD (GENERALIZED ANXIETY DISORDER): ICD-10-CM

## 2024-04-26 DIAGNOSIS — F42.2 MIXED OBSESSIONAL THOUGHTS AND ACTS: ICD-10-CM

## 2024-04-26 DIAGNOSIS — F31.9 BIPOLAR 1 DISORDER (CMD): Primary | ICD-10-CM

## 2024-04-26 DIAGNOSIS — G24.01 TARDIVE DYSKINESIA: ICD-10-CM

## 2024-04-26 DIAGNOSIS — F90.0 ATTENTION DEFICIT HYPERACTIVITY DISORDER (ADHD), PREDOMINANTLY INATTENTIVE TYPE: ICD-10-CM

## 2024-04-26 DIAGNOSIS — F31.9 BIPOLAR 1 DISORDER  (CMD): ICD-10-CM

## 2024-04-26 LAB
BASOPHILS # BLD: 0 K/MCL (ref 0–0.3)
BASOPHILS NFR BLD: 0 %
DEPRECATED RDW RBC: 43.5 FL (ref 39–50)
EOSINOPHIL # BLD: 0 K/MCL (ref 0–0.5)
EOSINOPHIL NFR BLD: 0 %
ERYTHROCYTE [DISTWIDTH] IN BLOOD: 16.1 % (ref 11–15)
HCT VFR BLD CALC: 33.3 % (ref 36–46.5)
HGB BLD-MCNC: 9.9 G/DL (ref 12–15.5)
IMM GRANULOCYTES # BLD AUTO: 0.1 K/MCL (ref 0–0.2)
IMM GRANULOCYTES # BLD: 1 %
LYMPHOCYTES # BLD: 2.1 K/MCL (ref 1–4.8)
LYMPHOCYTES NFR BLD: 18 %
MCH RBC QN AUTO: 22.2 PG (ref 26–34)
MCHC RBC AUTO-ENTMCNC: 29.7 G/DL (ref 32–36.5)
MCV RBC AUTO: 74.8 FL (ref 78–100)
MONOCYTES # BLD: 0.6 K/MCL (ref 0.3–0.9)
MONOCYTES NFR BLD: 5 %
NEUTROPHILS # BLD: 8.5 K/MCL (ref 1.8–7.7)
NEUTROPHILS NFR BLD: 76 %
NRBC BLD MANUAL-RTO: 0 /100 WBC
PLATELET # BLD AUTO: 385 K/MCL (ref 140–450)
RBC # BLD: 4.45 MIL/MCL (ref 4–5.2)
WBC # BLD: 11.2 K/MCL (ref 4.2–11)

## 2024-04-26 PROCEDURE — 85025 COMPLETE CBC W/AUTO DIFF WBC: CPT | Performed by: INTERNAL MEDICINE

## 2024-04-26 PROCEDURE — 36415 COLL VENOUS BLD VENIPUNCTURE: CPT | Performed by: INTERNAL MEDICINE

## 2024-04-26 RX ORDER — HYDROXYZINE PAMOATE 25 MG/1
25-50 CAPSULE ORAL 3 TIMES DAILY PRN
Qty: 60 CAPSULE | Refills: 5 | Status: SHIPPED | OUTPATIENT
Start: 2024-04-26

## 2024-04-26 RX ORDER — CLOMIPRAMINE HYDROCHLORIDE 75 MG/1
75 CAPSULE ORAL NIGHTLY
Qty: 30 CAPSULE | Refills: 11 | Status: SHIPPED | OUTPATIENT
Start: 2024-04-26

## 2024-04-30 ENCOUNTER — TELEPHONE (OUTPATIENT)
Dept: BEHAVIORAL HEALTH | Age: 34
End: 2024-04-30

## 2024-05-13 ENCOUNTER — E-ADVICE (OUTPATIENT)
Dept: OTHER | Age: 34
End: 2024-05-13

## 2024-05-13 ENCOUNTER — APPOINTMENT (OUTPATIENT)
Dept: BEHAVIORAL HEALTH | Age: 34
End: 2024-05-13

## 2024-05-13 DIAGNOSIS — F42.2 MIXED OBSESSIONAL THOUGHTS AND ACTS: ICD-10-CM

## 2024-05-13 DIAGNOSIS — F90.0 ATTENTION DEFICIT HYPERACTIVITY DISORDER (ADHD), PREDOMINANTLY INATTENTIVE TYPE: ICD-10-CM

## 2024-05-13 DIAGNOSIS — F31.9 BIPOLAR 1 DISORDER  (CMD): Primary | ICD-10-CM

## 2024-05-13 DIAGNOSIS — F41.1 GAD (GENERALIZED ANXIETY DISORDER): ICD-10-CM

## 2024-05-16 ENCOUNTER — E-ADVICE (OUTPATIENT)
Dept: BEHAVIORAL HEALTH | Age: 34
End: 2024-05-16

## 2024-05-19 ENCOUNTER — E-ADVICE (OUTPATIENT)
Dept: OTHER | Age: 34
End: 2024-05-19

## 2024-05-22 ENCOUNTER — LAB SERVICES (OUTPATIENT)
Dept: LAB | Age: 34
End: 2024-05-22

## 2024-05-22 DIAGNOSIS — F41.1 GAD (GENERALIZED ANXIETY DISORDER): ICD-10-CM

## 2024-05-22 DIAGNOSIS — F31.9 BIPOLAR 1 DISORDER  (CMD): ICD-10-CM

## 2024-05-22 LAB
BASOPHILS # BLD: 0.1 K/MCL (ref 0–0.3)
BASOPHILS NFR BLD: 1 %
DEPRECATED RDW RBC: 44.1 FL (ref 39–50)
EOSINOPHIL # BLD: 0 K/MCL (ref 0–0.5)
EOSINOPHIL NFR BLD: 0 %
ERYTHROCYTE [DISTWIDTH] IN BLOOD: 16.9 % (ref 11–15)
HCT VFR BLD CALC: 33.1 % (ref 36–46.5)
HGB BLD-MCNC: 9.8 G/DL (ref 12–15.5)
IMM GRANULOCYTES # BLD AUTO: 0 K/MCL (ref 0–0.2)
IMM GRANULOCYTES # BLD: 0 %
LYMPHOCYTES # BLD: 2.1 K/MCL (ref 1–4.8)
LYMPHOCYTES NFR BLD: 19 %
MCH RBC QN AUTO: 21.7 PG (ref 26–34)
MCHC RBC AUTO-ENTMCNC: 29.6 G/DL (ref 32–36.5)
MCV RBC AUTO: 73.4 FL (ref 78–100)
MONOCYTES # BLD: 0.7 K/MCL (ref 0.3–0.9)
MONOCYTES NFR BLD: 6 %
NEUTROPHILS # BLD: 8.5 K/MCL (ref 1.8–7.7)
NEUTROPHILS NFR BLD: 74 %
NRBC BLD MANUAL-RTO: 0 /100 WBC
PLATELET # BLD AUTO: 315 K/MCL (ref 140–450)
RBC # BLD: 4.51 MIL/MCL (ref 4–5.2)
WBC # BLD: 11.4 K/MCL (ref 4.2–11)

## 2024-05-22 PROCEDURE — 85025 COMPLETE CBC W/AUTO DIFF WBC: CPT | Performed by: INTERNAL MEDICINE

## 2024-05-22 PROCEDURE — 36415 COLL VENOUS BLD VENIPUNCTURE: CPT | Performed by: INTERNAL MEDICINE

## 2024-05-23 ENCOUNTER — TELEPHONE (OUTPATIENT)
Dept: BEHAVIORAL HEALTH | Age: 34
End: 2024-05-23

## 2024-05-24 ENCOUNTER — VIRTUAL VISIT (OUTPATIENT)
Dept: CARDIOLOGY | Facility: CLINIC | Age: 34
End: 2024-05-24
Attending: NURSE PRACTITIONER
Payer: COMMERCIAL

## 2024-05-24 VITALS — BODY MASS INDEX: 52.92 KG/M2 | WEIGHT: 293 LBS

## 2024-05-24 DIAGNOSIS — E66.813 CLASS 3 SEVERE OBESITY DUE TO EXCESS CALORIES WITH SERIOUS COMORBIDITY AND BODY MASS INDEX (BMI) OF 50.0 TO 59.9 IN ADULT (H): Primary | ICD-10-CM

## 2024-05-24 DIAGNOSIS — E66.01 CLASS 3 SEVERE OBESITY DUE TO EXCESS CALORIES WITH SERIOUS COMORBIDITY AND BODY MASS INDEX (BMI) OF 50.0 TO 59.9 IN ADULT (H): Primary | ICD-10-CM

## 2024-05-24 ASSESSMENT — PAIN SCALES - GENERAL: PAINLEVEL: NO PAIN (0)

## 2024-05-24 NOTE — NURSING NOTE
Is the patient currently in the state of [place]? NO, in [place]    Visit mode:TELEPHONE    If the visit is dropped, the patient can be reconnected by: TELEPHONE VISIT: Phone number: [phone]     Will anyone else be joining the visit? NO  (If patient encounters technical issues they should call [phone]    How would you like to obtain your AVS? MyChart    Are changes needed to the allergy or medication list? Pt stated no changes to allergies and Pt stated no med changes    Reason for visit: Follow Up    Krystina RUSSO

## 2024-05-24 NOTE — PATIENT INSTRUCTIONS
"Recommendations from today's MTM visit:                                                      Increase Wegovy to 1 mg once weekly in 2 weeks  Great job deleting fast food apps and removing snacks from your room!!   Try adding more fruits/vegetables to meals.   Try to eat home-cooked meals for dinner  Start incorporating more movement - walking dogs, riding bike     Quick/Easy Protein Sources:  Hard boiled eggs  Part-skim cheese sticks  Baby Bell cheese rounds  Low-fat/low-sugar Greek yogurt  Low-fat cottage cheese  Lean deli meat (chicken/turkey/ham)  Roasted chickpeas or lentils  Nuts   Turkey meat stick  Protein shake/bar  \"P3\" snack (cheese, nuts, deli meat)  [employer] \"Protein Bread\"   \"Egglife\" egg white wrap    Tuna/salmon can/packet      Follow-up: 3 months     It was great speaking with you today.  I value your experience and would be very thankful for your time in providing feedback in our clinic survey. In the next few days, you may receive an email or text message from [employer] with a link to a survey related to your  clinical pharmacist.\"     To schedule another MTM appointment, please call the clinic directly or you may call the MTM scheduling line at [phone].    My Clinical Pharmacist's contact information:                                                      Please feel free to contact me with any questions or concerns you have.      Floresita Salinas, PharmD, [facility]  Medication Therapy Management Pharmacist      "

## 2024-05-24 NOTE — PROGRESS NOTES
Medication Therapy Management (MTM) Encounter    ASSESSMENT:                            Medication Adherence/Access: No issues identified    Weight Management   Tolerating Wegovy well so far. Will increase the dose next month to see if it's more effective. Wegovy will be helpful for decreasing snacking. Will send ideas on high protein snacks and encouraged grocery shopping to help with preparing healthy dinners. Encouraged increased movement (walking dogs, riding bike) and increasing fruits/vegetables.     PLAN:                            Increase Wegovy to 1 mg once weekly in 2 weeks  Great job deleting fast food apps and removing snacks from your room!!   Try adding more fruits/vegetables to meals.   Try to eat home-cooked meals for dinner  Start incorporating more movement - walking dogs, riding bike    Follow-up: 3 months    SUBJECTIVE/OBJECTIVE:                          Ben Upton is a 33 year old female called for a follow-up visit.       Reason for visit: Wegovy follow-up.    Allergies/ADRs: Reviewed in chart  Past Medical History: Reviewed in chart  Tobacco: She reports that she has never smoked. She has never used smokeless tobacco.  Alcohol: rarely  Lives in [place]. Receives [facility] of her care through [facility].  Works from   Medication Adherence/Access: no issues reported  Initially started on Zepbound and switched to Wegovy due to backorder issue.     Weight Management   Wegovy 0.5 mg once weekly - 2 doses so far.     Zepbound initially recommended by her primary care provider. Has to get GLP-RA through medication therapy management due to insurance restrictions. She plans to establish with a weight management provider and registered dietitian in June when she's in [place].     She didn't notice anything with Wegovy 0.25 mg per week. The 0.5 mg dose has helped reduce hunger more than then 0.25 mg dose. No side effects. On clozapine which is associated with a significant amount of  "weight gain.     Diet/Eating Habits: diet \"hasn't been great\". She noticed she was gaining a few pounds and she deleted her fast food apps and removed snacks from her room. Breakfast: Vizcaino protein bar on work day. Snack: protein shake. Lunch: tuna packs with light burkett and crackers. Dinner: struggles with dinner the most. More snacking at night. Looks for cheap options like [employer] deals. Feels more hungry. Found a rotisserie chicken salad she likes at [employer] and puts on a izabella or low-carb izabella. Thinks she needs to work in more fruits and vegetables.   Exercise/Activity: has a gym membership but not going. Would like to get a bike. Could also start walk her dogs.   Tried/Failed/Contraindicated Medications:   No hx pancreatitis/thyroid cancer  Metformin: diarrhea      Initial Consult Weight: 309.5 lbs  Today's weight: 297 lbs the other day at doctor's appointment.   Weight: down about 12.5 lbs since initial consult - 4% body weight  A1c: 5.3% - 12/29/23 per care everywhere       Wt Readings from Last 4 Encounters:   05/24/24 133.4 kg (294 lb)   02/23/24 137.2 kg (302 lb 8 oz)   02/28/18 117 kg (258 lb)     Estimated body mass index is 52.92 kg/m  as calculated from the following:    Height as of 2/13/19: 1.588 m (5' 2.5\").    Weight as of this encounter: 133.4 kg (294 lb).      Today's Vitals: Wt 133.4 kg (294 lb)   BMI 52.92 kg/m    ----------------    I spent 20 minutes with this patient today. All changes were made via collaborative practice agreement with Marilynn Goldman CNP . A copy of the visit note was provided to the patient's provider(s).    A summary of these recommendations was sent via [employer].    Telemedicine Visit Details  Type of service:  Telephone visit  Start Time: 8:37 AM  End Time: 8:57 AM     Medication Therapy Recommendations  Class 3 severe obesity due to excess calories with serious comorbidity and body mass index (BMI) of 50.0 to 59.9 in adult (H)    Current Medication: " Semaglutide-Weight Management (WEGOVY) 0.5 MG/0.5ML pen (Discontinued)   Rationale: Dose too low - Dosage too low - Effectiveness   Recommendation: Increase Dose - Wegovy 1 MG/0.5ML Soaj   Status: Accepted per CPA

## 2024-05-24 NOTE — LETTER
5/24/2024      RE: Ben Upton  [address] [facility] [address]       Dear Colleague,    Thank you for the opportunity to participate in the care of your patient, Ben Upton, at the [facility] [place] at [facility]. Please see a copy of my visit note below.      Medication Therapy Management (MTM) Encounter    ASSESSMENT:                            Medication Adherence/Access: No issues identified    Weight Management   Tolerating Wegovy well so far. Will increase the dose next month to see if it's more effective. Wegovy will be helpful for decreasing snacking. Will send ideas on high protein snacks and encouraged grocery shopping to help with preparing healthy dinners. Encouraged increased movement (walking dogs, riding bike) and increasing fruits/vegetables.     PLAN:                            Increase Wegovy to 1 mg once weekly in 2 weeks  Great job deleting fast food apps and removing snacks from your room!!   Try adding more fruits/vegetables to meals.   Try to eat home-cooked meals for dinner  Start incorporating more movement - walking dogs, riding bike    Follow-up: 3 months    SUBJECTIVE/OBJECTIVE:                          Ben Upton is a 33 year old female called for a follow-up visit.       Reason for visit: Wegovy follow-up.    Allergies/ADRs: Reviewed in chart  Past Medical History: Reviewed in chart  Tobacco: She reports that she has never smoked. She has never used smokeless tobacco.  Alcohol: rarely  Lives in [place]. Receives all of her care through [facility].  Works from   Medication Adherence/Access: no issues reported  Initially started on Zepbound and switched to Wegovy due to backorder issue.     Weight Management  Wegovy 0.5 mg once weekly - 2 doses so far.     Zepbound initially recommended by her primary care provider. Has to get GLP-RA through medication therapy management due to  "insurance restrictions. She plans to establish with a weight management provider and registered dietitian in June when she's in [place].     She didn't notice anything with Wegovy 0.25 mg per week. The 0.5 mg dose has helped reduce hunger more than then 0.25 mg dose. No side effects. On clozapine which is associated with a significant amount of weight gain.     Diet/Eating Habits: diet \"hasn't been great\". She noticed she was gaining a few pounds and she deleted her fast food apps and removed snacks from her room. Breakfast: Vizcaino protein bar on work day. Snack: protein shake. Lunch: tuna packs with light burkett and crackers. Dinner: struggles with dinner the most. More snacking at night. Looks for cheap options like [employer] deals. Feels more hungry. Found a rotisserie chicken salad she likes at [employer] and puts on a izabella or low-carb izabella. Thinks she needs to work in more fruits and vegetables.   Exercise/Activity: has a gym membership but not going. Would like to get a bike. Could also start walk her dogs.   Tried/Failed/Contraindicated Medications:   No hx pancreatitis/thyroid cancer  Metformin: diarrhea      Initial Consult Weight: 309.5 lbs  Today's weight: 297 lbs the other day at doctor's appointment.   Weight: down about 12.5 lbs since initial consult - 4% body weight  A1c: 5.3% - 12/29/23 per care everywhere       Wt Readings from Last 4 Encounters:   05/24/24 133.4 kg (294 lb)   02/23/24 137.2 kg (302 lb 8 oz)   02/28/18 117 kg (258 lb)     Estimated body mass index is 52.92 kg/m  as calculated from the following:    Height as of 2/13/19: 1.588 m (5' 2.5\").    Weight as of this encounter: 133.4 kg (294 lb).      Today's Vitals: Wt 133.4 kg (294 lb)   BMI 52.92 kg/m    ----------------    I spent 20 minutes with this patient today. All changes were made via collaborative practice agreement with Marilynn Goldman CNP . A copy of the visit note was provided to the patient's provider(s).    A summary of " these recommendations was sent via [employer].    Telemedicine Visit Details  Type of service:  Telephone visit  Start Time: 8:37 AM  End Time: 8:57 AM     Medication Therapy Recommendations  Class 3 severe obesity due to excess calories with serious comorbidity and body mass index (BMI) of 50.0 to 59.9 in adult (H)    Current Medication: Semaglutide-Weight Management (WEGOVY) 0.5 MG/0.5ML pen (Discontinued)   Rationale: Dose too low - Dosage too low - Effectiveness   Recommendation: Increase Dose - Wegovy 1 MG/0.5ML Soaj   Status: Accepted per CPA                Please do not hesitate to contact me if you have any questions/concerns.     Sincerely,     AMADEO CORTÉS RPH

## 2024-05-24 NOTE — PROGRESS NOTES
Virtual Visit Details    Type of service:  Telephone Visit   Phone call duration: 20 minutes   Originating Location (pt. Location): Home    Distant Location (provider location):  Off-site

## 2024-06-10 ENCOUNTER — APPOINTMENT (OUTPATIENT)
Dept: BEHAVIORAL HEALTH | Age: 34
End: 2024-06-10

## 2024-06-17 ASSESSMENT — ANXIETY QUESTIONNAIRES
GAD7 TOTAL SCORE: 6
7. FEELING AFRAID AS IF SOMETHING AWFUL MIGHT HAPPEN: 0 - NOT AT ALL
4. TROUBLE RELAXING: 1 - SEVERAL DAYS
3. WORRYING TOO MUCH ABOUT DIFFERENT THINGS: SEVERAL DAYS
6. BECOMING EASILY ANNOYED OR IRRITABLE: SEVERAL DAYS
5. BEING SO RESTLESS THAT IT IS HARD TO SIT STILL: NOT AT ALL
IF YOU CHECKED OFF ANY PROBLEMS ON THIS QUESTIONNAIRE, HOW DIFFICULT HAVE THESE PROBLEMS MADE IT FOR YOU TO DO YOUR WORK, TAKE CARE OF THINGS AT HOME, OR GET ALONG WITH OTHER PEOPLE: SOMEWHAT DIFFICULT
6. BECOMING EASILY ANNOYED OR IRRITABLE: 1 - SEVERAL DAYS
7. FEELING AFRAID AS IF SOMETHING AWFUL MIGHT HAPPEN: NOT AT ALL
3. WORRYING TOO MUCH ABOUT DIFFERENT THINGS: 1 - SEVERAL DAYS
5. BEING SO RESTLESS THAT IT IS HARD TO SIT STILL: 0 - NOT AT ALL
2. NOT BEING ABLE TO STOP OR CONTROL WORRYING: 1 - SEVERAL DAYS
4. TROUBLE RELAXING: SEVERAL DAYS
1. FEELING NERVOUS, ANXIOUS, OR ON EDGE: 2 - MORE THAN HALF THE DAYS
GAD7 TOTAL SCORE: 6
1. FEELING NERVOUS, ANXIOUS, OR ON EDGE: MORE THAN HALF THE DAYS
2. NOT BEING ABLE TO STOP OR CONTROL WORRYING: SEVERAL DAYS
IF YOU CHECKED OFF ANY PROBLEMS ON THIS QUESTIONNAIRE, HOW DIFFICULT HAVE THESE PROBLEMS MADE IT FOR YOU TO DO YOUR WORK, TAKE CARE OF THINGS AT HOME, OR GET ALONG WITH OTHER PEOPLE: SOMEWHAT DIFFICULT

## 2024-06-17 ASSESSMENT — PATIENT HEALTH QUESTIONNAIRE - PHQ9
6. FEELING BAD ABOUT YOURSELF - OR THAT YOU ARE A FAILURE OR HAVE LET YOURSELF OR YOUR FAMILY DOWN: NOT AT ALL
4. FEELING TIRED OR HAVING LITTLE ENERGY: NOT AT ALL
CLINICAL INTERPRETATION OF PHQ9 SCORE: MINIMAL DEPRESSION
9. THOUGHTS THAT YOU WOULD BE BETTER OFF DEAD, OR OF HURTING YOURSELF: NOT AT ALL
2. FEELING DOWN, DEPRESSED OR HOPELESS: SEVERAL DAYS
5. POOR APPETITE OR OVEREATING: NOT AT ALL
SUM OF ALL RESPONSES TO PHQ QUESTIONS 1-9: 3
SUM OF ALL RESPONSES TO PHQ9 QUESTIONS 1 AND 2: 2
3. TROUBLE FALLING OR STAYING ASLEEP OR SLEEPING TOO MUCH: NOT AT ALL
1. LITTLE INTEREST OR PLEASURE IN DOING THINGS: SEVERAL DAYS
7. TROUBLE CONCENTRATING ON THINGS, SUCH AS READING THE NEWSPAPER OR WATCHING TELEVISION: SEVERAL DAYS
CLINICAL INTERPRETATION OF PHQ2 SCORE: NO FURTHER SCREENING NEEDED
8. MOVING OR SPEAKING SO SLOWLY THAT OTHER PEOPLE COULD HAVE NOTICED. OR THE OPPOSITE, BEING SO FIGETY OR RESTLESS THAT YOU HAVE BEEN MOVING AROUND A LOT MORE THAN USUAL: NOT AT ALL

## 2024-06-18 ENCOUNTER — APPOINTMENT (OUTPATIENT)
Dept: BEHAVIORAL HEALTH | Age: 34
End: 2024-06-18

## 2024-06-18 DIAGNOSIS — F31.9 BIPOLAR 1 DISORDER  (CMD): Primary | ICD-10-CM

## 2024-06-18 DIAGNOSIS — F90.0 ATTENTION DEFICIT HYPERACTIVITY DISORDER (ADHD), PREDOMINANTLY INATTENTIVE TYPE: ICD-10-CM

## 2024-06-18 DIAGNOSIS — F41.1 GAD (GENERALIZED ANXIETY DISORDER): ICD-10-CM

## 2024-06-18 DIAGNOSIS — F42.2 MIXED OBSESSIONAL THOUGHTS AND ACTS: ICD-10-CM

## 2024-06-23 ENCOUNTER — E-ADVICE (OUTPATIENT)
Dept: BEHAVIORAL HEALTH | Age: 34
End: 2024-06-23

## 2024-06-24 ENCOUNTER — LAB SERVICES (OUTPATIENT)
Dept: LAB | Age: 34
End: 2024-06-24

## 2024-06-24 ENCOUNTER — APPOINTMENT (OUTPATIENT)
Dept: BEHAVIORAL HEALTH | Age: 34
End: 2024-06-24

## 2024-06-24 ENCOUNTER — E-ADVICE (OUTPATIENT)
Dept: BEHAVIORAL HEALTH | Age: 34
End: 2024-06-24

## 2024-06-24 DIAGNOSIS — F31.9 BIPOLAR 1 DISORDER  (CMD): ICD-10-CM

## 2024-06-24 DIAGNOSIS — F41.1 GAD (GENERALIZED ANXIETY DISORDER): ICD-10-CM

## 2024-06-24 DIAGNOSIS — F31.9 BIPOLAR 1 DISORDER  (CMD): Primary | ICD-10-CM

## 2024-06-24 LAB
BASOPHILS # BLD: 0 K/MCL (ref 0–0.3)
BASOPHILS NFR BLD: 0 %
DEPRECATED RDW RBC: 46 FL (ref 39–50)
EOSINOPHIL # BLD: 0 K/MCL (ref 0–0.5)
EOSINOPHIL NFR BLD: 0 %
ERYTHROCYTE [DISTWIDTH] IN BLOOD: 18.2 % (ref 11–15)
HCT VFR BLD CALC: 35.7 % (ref 36–46.5)
HGB BLD-MCNC: 10.5 G/DL (ref 12–15.5)
IMM GRANULOCYTES # BLD AUTO: 0 K/MCL (ref 0–0.2)
IMM GRANULOCYTES # BLD: 0 %
LYMPHOCYTES # BLD: 2.2 K/MCL (ref 1–4.8)
LYMPHOCYTES NFR BLD: 20 %
MCH RBC QN AUTO: 21.1 PG (ref 26–34)
MCHC RBC AUTO-ENTMCNC: 29.4 G/DL (ref 32–36.5)
MCV RBC AUTO: 71.8 FL (ref 78–100)
MONOCYTES # BLD: 0.5 K/MCL (ref 0.3–0.9)
MONOCYTES NFR BLD: 5 %
NEUTROPHILS # BLD: 8.3 K/MCL (ref 1.8–7.7)
NEUTROPHILS NFR BLD: 75 %
NRBC BLD MANUAL-RTO: 0 /100 WBC
PLATELET # BLD AUTO: 302 K/MCL (ref 140–450)
RBC # BLD: 4.97 MIL/MCL (ref 4–5.2)
WBC # BLD: 11.1 K/MCL (ref 4.2–11)

## 2024-06-24 PROCEDURE — 36415 COLL VENOUS BLD VENIPUNCTURE: CPT | Performed by: INTERNAL MEDICINE

## 2024-06-24 PROCEDURE — 85025 COMPLETE CBC W/AUTO DIFF WBC: CPT | Performed by: INTERNAL MEDICINE

## 2024-06-25 NOTE — PROGRESS NOTES
"Video-Visit Details    Type of service:  Video Visit    Video Start Time: 1:19 PM   Video End Time: 1:51 PM    Originating Location (pt. Location): Home    Distant Location (provider location):  Offsite (providers home) [facility] [place]     Platform used for Video Visit: Ntractive      New Weight Management Nutrition Consultation    Ben Upton is a 33 year old female presents today for new weight management nutrition consultation.  Patient referred by Juliet Emanuel PA-C on 2024.    Patient with Co-morbidities of obesity includin/24/2024     6:38 PM   --   I have the following health issues associated with obesity Fatty Liver    Asthma   I have the following symptoms associated with obesity Depression    Irregular Menstral Cycle         Anthropometrics:  Estimated body mass index is 52.2 kg/m  as calculated from the following:    Height as of an earlier encounter on 24: 1.588 m (5' 2.5\").    Weight as of an earlier encounter on 24: 131.5 kg (290 lb).    Started Wegovy a few months ago at wt of 310 lbs.     Medications for Weight Loss:  Wegovy    NUTRITION HISTORY  Food allergies: None  Food intolerances: Dairy  Vitamin/Mineral Supplements: None  More recently has been focusing on 1500 doris/day diet, healthier food choices, bought a bike.   Waking around 7:45 am   Strong cravings for ice cream   Lives with Mom, who cooks a couple meals per week, otherwise she is cooking her on.    Preferences: enjoys variety of foods     Recent Diet Recall:  Breakfast: Atkins meal bar; turkey sausage and protein pancakes   AM snack: protein shake  Lunch: light-tuna pkt; Bird eye quinoa bowl   Dinner: chicken thighs, baby red potato and salad   After dinner snacks: ice cream from [employer]  Beverages: Crystal light/water - half gallon yeti daily; diet soda a few times monthly.   Alcohol: None  Dining Out: Subway (6\" jacqueline cheese stick on wheat); Mcdonalds " (Mchicken, cheese burger and davies)          6/24/2024     6:38 PM   Diet Recall Review with Patient   If you do eat breakfast, what types of food do you eat? Atkins meal bar   If you do eat lunch, what types of food do you typically eat? Protein Shake/ tuna kit   How many glasses of juice do you drink in a typical day? 0   How many of glasses of milk do you drink in a typical day? 1   If you do drink milk, what type? 1%   How many 8oz glasses of sugar containing drinks such as Hector-Aid/sweet tea do you drink in a day? 0   How many cans/bottles of sugar pop/soda/tea/sports drinks do you drink in a day? 0   How many cans/bottles of diet pop/soda/tea or sports drink do you drink in a day? 2   How often do you have a drink of alcohol? Never           6/24/2024     6:38 PM   Eating Habits   Generally, my meals include foods like these bread, pasta, rice, potatoes, corn, crackers, sweet dessert, pop, or juice A Few Times a Week   Generally, my meals include foods like these fried meats, brats, burgers, french fries, pizza, cheese, chips, or ice cream A Few Times a Week   Eat fast food (like McDonalds, Burger Ethan, Taco Bell) Once a Week   Eat at a buffet or sit-down restaurant Less Than Weekly   Eat most of my meals in front of the TV or computer Almost Everyday   Often skip meals, eat at random times, have no regular eating times A Few Times a Week   Rarely sit down for a meal but snack or graze throughout Less Than Weekly   Eat extra snacks between meals A Few Times a Week   Eat most of my food at the end of the day Almost Everyday   Eat in the middle of the night or wake up at night to eat A Few Times a Week   Eat extra snacks to prevent or correct low blood sugar Never   Eat to prevent acid reflux or stomach pain Never   Worry about not having enough food to eat Never   I eat when I am depressed Never   I eat when I am stressed A Few Times a Week   I eat when I am bored A Few Times a Week   I eat when I am anxious  Never   I eat when I am happy or as a reward A Few Times a Week   I feel hungry all the time even if I just have eaten Never   Feeling full is important to me Less Than Weekly   I finish all the food on my plate even if I am already full A Few Times a Week   I can't resist eating delicious food or walk past the good food/smell A Few Times a Week   I eat/snack without noticing that I am eating A Few Times a Week   I eat when I am preparing the meal Never   I eat more than usual when I see others eating Never   I have trouble not eating sweets, ice cream, cookies, or chips if they are around the house A Few Times a Week   I think about food all day A Few Times a Week   What foods, if any, do you crave? Sweets/Candy/Chocolate   Please list any other foods you crave? Icecream           6/24/2024     6:38 PM   Amount of Food   I feel out of control when eating Weekly   I eat a large amount of food, like a loaf of bread, a box of cookies, a pint/quart of ice cream, all at once Weekly   I eat a large amount of food even when I am not hungry Monthly   I eat rapidly Monthly   I eat alone because I feel embarrassed and do not want others to see how much I have eaten Weekly   I eat until I am uncomfortably full Never   I feel bad, disgusted, or guilty after I overeat Almost Everyday       Physical Activity:  Just bought an outdoor bike. Enjoys biking in her neighborhood.          6/24/2024     6:38 PM   Activity/Exercise History   How much of a typical 12 hour day do you spend sitting? Half the Day   How much of a typical 12 hour day do you spend lying down? Less Than Half the Day   How much of a typical day do you spend walking/standing? Half the Day   How many hours (not including work) do you spend on the TV/Video Games/Computer/Tablet/Phone? 4-5 Hours   How many times a week are you active for the purpose of exercise? Once a Week   What keeps you from being more active? Other   How many total minutes do you spend doing  "some activity for the purpose of exercising when you exercise? 15-30 Minutes       Nutrition Prescription  Recommended energy/nutrient modification.    Nutrition Diagnosis  Obesity r/t long history of positive energy balance aeb BMI >30.    Nutrition Intervention  Materials/education provided on hypocaloric diet for weight loss. Discussed Volumetric eating to help satiety level on fewer calories; portion control and healthy food choices (Plate Method and Volumetrics handouts), lower calorie snack choices, meal and snack planning tips and resources. Provided education on nutrition considerations when starting GLP1 medication including, changes in meal/snack volumes; meeting protein, hydration and micronutrient needs; limiting high-fat foods; and diet/lifestyle strategies for preventing constipation.  Patient demonstrates understanding.  Co-developed goals to work towards.   Provided pt with list of goals and resources below via [employer].     Expected Engagement: good  Follow-Up Plans: meal/snack planning     Nutrition Goals  1) Limit ice cream to once weekly   - Could do sugar-free pudding instead   2) Can try another protein shake in afternoon to help with dinner portions.   3) Increase biking. Set a goal for number of days per week to bike. Record progress on calendar or journal.       Quick/Easy Protein Sources:  Hard boiled eggs  Part-skim cheese sticks  Baby Bell cheese rounds  Low-fat/low-sugar Greek yogurt  Low-fat cottage cheese  Lean deli meat (chicken/turkey/ham)  Roasted chickpeas or lentils  Nuts   Turkey meat stick  Protein shake/bar  \"P3\" snack (cheese, nuts, deli meat)  Aldi's \"Protein Bread\"   \"Egglife\" egg white wrap    Tuna/salmon can/packet       The Plate Method  [email or web address]    Protein Sources   [email or web address]     Carbohydrates  [email or web address]     Mindful Eating  [email or web address]     Summary of Volumetrics Eating " "Plan  [email or web address]     Healthy Recipe Resources:  Books:  \"The Volumetrics Eating Plan\" by Brenda Foote, Ph.D.  \"Cooking that Counts\" by editors of Soundl.ly  \"Calorie Smart Meals\" cookbook by [employer] (200-500 calorie meals)    Websites:  www.Cubresa  https://www.Parametric Dining/  www.Aprius  https://www.diabetesfoodhub.org/all-recipes.html  Https://www.Dailybreak Mediaplate.gov/myplatekitchen  Recipes by Season: https://snaped.MyNextRuns.usda.gov/recipes-menus   Video Meal Prep: Https://www.Party Earth.com/c/elyssacoen   Meal Plans: EatthiSaffron Technology.com   DASH Diet: https://www.nhlbi.nih.gov/education/dash-eating-plan  Whole Grains [place]: https://wholeTastemadeainscouncil.org/recipes      Cultural Cuisines:  Various Regions of African Cuisine: https://www.Medify.Motivano/recipes/75552/cuisines-regions//   Cuisine: https://www.Despegar.com.org/knowledge-center/recipes/  Mexican and Vegan Cuisine:   https://ServerPilot/  https://Bruin Brake Cables/recipe-index/  Chinese Cuisine: https://www.Click & Grow/  South Asian Cuisine:  Jaycee Figueroa on social media- South  high-protein/low-calorie meal/food ideas  Mariel Henderson RD, CDCES, plant-based South  Resources: https://www.[employer].Motivano/    Apps:  MealVisiarc veronika (or website, mealime.com)   SpendSmartEatSmart           Follow-Up:  1 month, PRN    Time spent with patient: 32 minutes.  Chantell Covington RD, LD      "

## 2024-06-26 ENCOUNTER — TELEPHONE (OUTPATIENT)
Dept: ENDOCRINOLOGY | Facility: CLINIC | Age: 34
End: 2024-06-26
Payer: COMMERCIAL

## 2024-06-27 ENCOUNTER — VIRTUAL VISIT (OUTPATIENT)
Dept: ENDOCRINOLOGY | Facility: CLINIC | Age: 34
End: 2024-06-27
Payer: COMMERCIAL

## 2024-06-27 VITALS — WEIGHT: 290 LBS | HEIGHT: 63 IN | BODY MASS INDEX: 51.38 KG/M2

## 2024-06-27 DIAGNOSIS — E66.01 CLASS 3 SEVERE OBESITY WITH SERIOUS COMORBIDITY AND BODY MASS INDEX (BMI) OF 50.0 TO 59.9 IN ADULT, UNSPECIFIED OBESITY TYPE (H): Primary | ICD-10-CM

## 2024-06-27 DIAGNOSIS — E66.813 CLASS 3 SEVERE OBESITY WITH SERIOUS COMORBIDITY AND BODY MASS INDEX (BMI) OF 50.0 TO 59.9 IN ADULT, UNSPECIFIED OBESITY TYPE (H): Primary | ICD-10-CM

## 2024-06-27 DIAGNOSIS — E66.01 CLASS 3 SEVERE OBESITY DUE TO EXCESS CALORIES WITH SERIOUS COMORBIDITY AND BODY MASS INDEX (BMI) OF 50.0 TO 59.9 IN ADULT (H): Primary | ICD-10-CM

## 2024-06-27 DIAGNOSIS — E66.813 CLASS 3 SEVERE OBESITY DUE TO EXCESS CALORIES WITH SERIOUS COMORBIDITY AND BODY MASS INDEX (BMI) OF 50.0 TO 59.9 IN ADULT (H): Primary | ICD-10-CM

## 2024-06-27 DIAGNOSIS — Z71.3 NUTRITIONAL COUNSELING: ICD-10-CM

## 2024-06-27 PROBLEM — K80.20 GALLSTONES: Status: ACTIVE | Noted: 2024-03-20

## 2024-06-27 PROBLEM — K44.9 HIATAL HERNIA: Status: ACTIVE | Noted: 2018-03-19

## 2024-06-27 PROBLEM — F90.0 ATTENTION DEFICIT HYPERACTIVITY DISORDER (ADHD), PREDOMINANTLY INATTENTIVE TYPE: Status: ACTIVE | Noted: 2023-06-15

## 2024-06-27 PROBLEM — F41.9 ANXIETY: Status: ACTIVE | Noted: 2024-06-27

## 2024-06-27 PROBLEM — J45.20 MILD INTERMITTENT ASTHMA: Status: ACTIVE | Noted: 2017-11-28

## 2024-06-27 PROBLEM — R03.0 ELEVATED BLOOD-PRESSURE READING WITHOUT DIAGNOSIS OF HYPERTENSION: Status: ACTIVE | Noted: 2022-12-30

## 2024-06-27 PROBLEM — G47.09 OTHER INSOMNIA: Status: ACTIVE | Noted: 2023-06-15

## 2024-06-27 PROBLEM — K76.0 FATTY LIVER: Status: ACTIVE | Noted: 2024-03-20

## 2024-06-27 PROBLEM — D50.9 IRON DEFICIENCY ANEMIA: Status: ACTIVE | Noted: 2024-03-20

## 2024-06-27 PROBLEM — L23.9 ALLERGIC DERMATITIS: Status: ACTIVE | Noted: 2021-08-30

## 2024-06-27 PROBLEM — F41.1 GAD (GENERALIZED ANXIETY DISORDER): Status: ACTIVE | Noted: 2023-06-15

## 2024-06-27 PROBLEM — F42.9 OCD (OBSESSIVE COMPULSIVE DISORDER): Status: ACTIVE | Noted: 2023-06-15

## 2024-06-27 PROCEDURE — 99207 PR NO CHARGE LOS: CPT | Mod: 95 | Performed by: DIETITIAN, REGISTERED

## 2024-06-27 PROCEDURE — G2211 COMPLEX E/M VISIT ADD ON: HCPCS | Mod: 95

## 2024-06-27 PROCEDURE — 99204 OFFICE O/P NEW MOD 45 MIN: CPT | Mod: 95

## 2024-06-27 PROCEDURE — 97802 MEDICAL NUTRITION INDIV IN: CPT | Mod: 95 | Performed by: DIETITIAN, REGISTERED

## 2024-06-27 RX ORDER — HYDROXYZINE PAMOATE 25 MG/1
25-50 CAPSULE ORAL
COMMUNITY
Start: 2024-04-26

## 2024-06-27 ASSESSMENT — PAIN SCALES - GENERAL
PAINLEVEL: NO PAIN (0)
PAINLEVEL: NO PAIN (0)

## 2024-06-27 NOTE — PROGRESS NOTES
Stop wegovy. Start Zepbound. See Juliet Emanuel PA-C's note from today for more details.     Floresita Salinas, PharmD, AAHIVP  Medication Therapy Management Pharmacist

## 2024-06-27 NOTE — NURSING NOTE
Is the patient currently in the state of [place]? YES    Visit mode:VIDEO    If the visit is dropped, the patient can be reconnected by: VIDEO VISIT: Text to cell phone:   Telephone Information:   Mobile [phone]       Will anyone else be joining the visit? NO  (If patient encounters technical issues they should call [phone]    How would you like to obtain your AVS? MyChart    Are changes needed to the allergy or medication list? No    Are refills needed on medications prescribed by this physician? NO    Reason for visit: Consult    Muriel RUSSO
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
## 2024-06-27 NOTE — PATIENT INSTRUCTIONS
"Hi CJ,    Follow-up with RD in 1 month.     Thank you,    Chantell Covington, RD, LD  If you would like to schedule or reschedule an appointment with the RD, please call [phone]    Nutrition Goals  1) Limit ice cream to once weekly   - Could do sugar-free pudding instead   2) Can try another protein shake in afternoon to help with dinner portions.   3) Increase biking. Set a goal for number of days per week to bike. Record progress on calendar or journal.       Quick/Easy Protein Sources:  Hard boiled eggs  Part-skim cheese sticks  Baby Bell cheese rounds  Low-fat/low-sugar Greek yogurt  Low-fat cottage cheese  Lean deli meat (chicken/turkey/ham)  Roasted chickpeas or lentils  Nuts   Turkey meat stick  Protein shake/bar  \"P3\" snack (cheese, nuts, deli meat)  Aldi's \"Protein Bread\"   \"Egglife\" egg white wrap    Tuna/salmon can/packet       The Plate Method  [email or web address]    Protein Sources   [email or web address]     Carbohydrates  [email or web address]     Mindful Eating  [email or web address] [email or web address]     Summary of Volumetrics Eating Plan  [email or web address]     Healthy Recipe Resources:  Books:  \"The Volumetrics Eating Plan\" by Brenda Foote, Ph.D.  \"Cooking that Counts\" by editors of Axentis Software  \"Calorie Smart Meals\" cookbook by Better Homes and Urban Mappings (200-500 calorie meals)    Websites:  www.Thing Labs  https://www.Beanstalk Tax/  www.Cardeas Pharma.org  https://www.diabetesfoodhub.org/all-recipes.html  Https://www.choosemyplate.gov/myplatekitchen  Recipes by Season: https://snaped.fns.usda.gov/recipes-menus   Video Meal Prep: Https://www.youtube.com/c/jamie   Meal Plans: EatParis Labs.com   DASH Diet: https://www.nhlbi.nih.gov/education/dash-eating-plan  Whole Grains [place]: https://wholegrainscouncil.org/recipes      Cultural Cuisines:  Various Regions of African Cuisine: https://www.Care Thread.Pushing Innovation/recipes/65482/cuisines-regions//   " Cuisine: https://www.Pan Global BrandnatEtcetera Edutainment.org/knowledge-center/recipes/  Mexican and Vegan Cuisine:   https://PurpleBricks/  https://Saber Hacer/recipe-index/  Chinese Cuisine: https://www.DoPay.Wedge Buster/  South Asian Cuisine:  Jaycee Figueroa on social media- South  high-protein/low-calorie meal/food ideas  Mariel Henderson RD, [place], plant-based South  Resources: https://www.Stopford Projects/    Apps:  MealAffresol veronika (or website, mealime.com)   [facility]       COMPREHENSIVE WEIGHT MANAGEMENT PROGRAM  VIRTUAL SUPPORT GROUPS    At [facility], our Comprehensive Weight Management program offers on-line support groups for patients who are working on weight loss and considering, preparing for, or have had weight loss surgery.     There is no cost for this opportunity.  You are invited to attend the?Virtual Support Groups?provided by any of the following locations:    [facility] via Microsoft Teams with Maria D Grubbs RN  2.   [place] via Jiuxian.com with Mohsen Perdue, PhD, LP  3.   [place] via Jiuxian.com with Bisi Page RN  4.   [facility] via a Zoom Meeting with CHRISTIAN Garcia    The following Support Group information can also be found on our website:  https://www.[facility].org/treatments/weight-loss-and-weight-loss-surgery-support-groups      [facility] Weight Loss Surgery Support Group  The support group is a patient-lead forum that meets monthly to share experiences, encouragement and education. It is open to those who have had weight loss surgery, are scheduled for surgery, or are considering surgery.   WHEN: This group meets on the 3rd Wednesday of each month from 5:00PM - 6:00PM virtually using Microsoft Teams.   FACILITATOR: Led by Maria D Gonzalez RD, LD, RN, the program's Clinical Coordinator.   TO REGISTER: Please contact the clinic via ServiceMax or call the nurse line directly at [phone] to inform our staff that you would like  "an invite sent to you and to let us know the email you would like the invite sent to. Prior to the meeting, a link with directions on how to join the meeting will be sent to you.    2023 and 2024 Meetings   December 20  January 17  February 21  March 20  April 17  May 15  Marley 19      [facility] Bariatric Care Support Group?  This is open to all pre- and post- operative bariatric surgery patients as well as their support system.   WHEN: This group meets the 3rd Tuesday of each month from 6:30 PM - 8:00 PM virtually using Microsoft Teams.   FACILITATOR: Led by Mohsen Perdue, Ph.D who is a Licensed Psychologist with the [facility] Comprehensive Weight Management Program.   TO REGISTER: Please send an email to Mohsen Perdue, Ph.D., LP at?[email or web address]?if you would like an invitation to the group. Prior to the meeting, a link with directions on how to join the meeting will be sent to you.    2023 and 2024 Meetings  December 19 January 16: \"Medication Management and Bariatric Surgery\", Hali Taylor, PharmD, Pharmacy Resident at [facility]  February 20: \"A Bariatric Surgery Patient's Perspective\", MAXIMINO Pagan, [facility], Behavioral Health Clinician at [facility]  March 19  April 16  May 21  Marley 18: \"Nutritional Labeling\", Dietitian speaker to be announced.  November 19: \"Holiday Eating\", Dietitian speaker to be announced.    [facility] Post-Operative Bariatric Surgery Support Group  This is a support group for [facility] bariatric patients (and those external to [facility]) who have had bariatric surgery and are at least 3 months post-surgery.  WHEN: This support group meets the 4th Wednesday of the month from 11:00 AM - 12:00 PM virtually using Microsoft Teams.   FACILITATOR: Led by Certified Bariatric Nurse, Bisi Page, " "RN.   TO REGISTER: Please send an email to Bisi at [email or web address] if you would like an invitation to the group.  Prior to the meeting, a link with directions on how to join the meeting will be sent to you.    2023 and 2024 Meetings  December 27  January 24  February 28  March 27  April 24  May 22  Marley 26    [facility] Healthy Lifestyle Group?  This is a 60 minute virtual coaching group for those who want to lead a healthier lifestyle. Come together to set goals and overcome barriers in a supportive group environment. We will address the four pillars of health: nutrition, exercise, sleep and emotional well-being.  This group is highly recommended for those who are participating in the 24 week Healthy Lifestyle Plan and our Health Coaching sessions.  WHEN: This group meets the 1st Friday of the month, 12:30 PM - 1:30 PM online, via a zoom meeting.    FACILITATOR: Led by National Board Certified Health and , Bisi Pelaez, [facility].   TO REGISTER: Please call the Call Center at [phone] to register.  You will get an appointment to attend in [employer]. Fifteen minutes prior to the meeting, complete the e-check in and you will get the link to join the meeting.    There is no charge to attend this group and space is limited.     2023 and 2024 Meetings  December 1: \"Let's Talk\" (guided discussion on our wins and challenges)  January 5: \"New Years Vision: Manifest your Best 2024!\" (guided imagery,  journaling and discussion)  February 2: \"Let's Talk\"  March 1: \"10 Percent Happier\" by Martell Willard (Book Bites - a guided discussion on the nuggets of wisdom from favorite wellness books, no need to read the book but highly encouraged)  April 5: \"Let's Talk\"  May 3: \"Essentialism: The Disciplined Pursuit of Less\" by Jd Farias (Book Bites discussion)  June 7: \"Let's Talk\"  July 5: NO MEETING, off for the 4th of July Holiday  August 2: \"The Blue Zones, Secrets " "for Living a Longer Life\" by Martell Thompson (Book Bites discussion)                    "

## 2024-06-27 NOTE — LETTER
"2024       RE: Ben Upton  [address] [address]     Dear Colleague,    Thank you for referring your patient, Ben Upton, to the [facility] [place] at [facility]. Please see a copy of my visit note below.    Video-Visit Details    Type of service:  Video Visit    Video Start Time: 1:19 PM   Video End Time: 1:51 PM    Originating Location (pt. Location): Home    Distant Location (provider location):  Offsite (providers home) [facility] [place]     Platform used for Video Visit: creads      New Weight Management Nutrition Consultation    Ben Upton is a 33 year old female presents today for new weight management nutrition consultation.  Patient referred by Juliet Emanuel PA-C on 2024.    Patient with Co-morbidities of obesity includin/24/2024     6:38 PM   --   I have the following health issues associated with obesity Fatty Liver    Asthma   I have the following symptoms associated with obesity Depression    Irregular Menstral Cycle         Anthropometrics:  Estimated body mass index is 52.2 kg/m  as calculated from the following:    Height as of an earlier encounter on 24: 1.588 m (5' 2.5\").    Weight as of an earlier encounter on 24: 131.5 kg (290 lb).    Started Wegovy a few months ago at wt of 310 lbs.     Medications for Weight Loss:  Wegovy    NUTRITION HISTORY  Food allergies: None  Food intolerances: Dairy  Vitamin/Mineral Supplements: None  More recently has been focusing on 1500 doris/day diet, healthier food choices, bought a bike.   Waking around 7:45 am   Strong cravings for ice cream   Lives with Mom, who cooks a couple meals per week, otherwise she is cooking her on.    Preferences: enjoys variety of foods     Recent Diet Recall:  Breakfast: Atkins meal bar; turkey sausage and protein pancakes   AM snack: " "protein shake  Lunch: light-tuna pkt; Bird eye quinoa bowl   Dinner: chicken thighs, baby red potato and salad   After dinner snacks: ice cream from Culvers  Beverages: Crystal light/water - half gallon yeti daily; diet soda a few times monthly.   Alcohol: None  Dining Out: Subway (6\" jacqueline cheese stick on wheat); Mcdonalds (Mchicken, cheese burger and davies)          6/24/2024     6:38 PM   Diet Recall Review with Patient   If you do eat breakfast, what types of food do you eat? Atkins meal bar   If you do eat lunch, what types of food do you typically eat? Protein Shake/ tuna kit   How many glasses of juice do you drink in a typical day? 0   How many of glasses of milk do you drink in a typical day? 1   If you do drink milk, what type? 1%   How many 8oz glasses of sugar containing drinks such as Hector-Aid/sweet tea do you drink in a day? 0   How many cans/bottles of sugar pop/soda/tea/sports drinks do you drink in a day? 0   How many cans/bottles of diet pop/soda/tea or sports drink do you drink in a day? 2   How often do you have a drink of alcohol? Never           6/24/2024     6:38 PM   Eating Habits   Generally, my meals include foods like these bread, pasta, rice, potatoes, corn, crackers, sweet dessert, pop, or juice A Few Times a Week   Generally, my meals include foods like these fried meats, brats, burgers, french fries, pizza, cheese, chips, or ice cream A Few Times a Week   Eat fast food (like McDonalds, Burger Ethan, Taco Bell) Once a Week   Eat at a buffet or sit-down restaurant Less Than Weekly   Eat most of my meals in front of the TV or computer Almost Everyday   Often skip meals, eat at random times, have no regular eating times A Few Times a Week   Rarely sit down for a meal but snack or graze throughout Less Than Weekly   Eat extra snacks between meals A Few Times a Week   Eat most of my food at the end of the day Almost Everyday   Eat in the middle of the night or wake up at night to eat A Few " Times a Week   Eat extra snacks to prevent or correct low blood sugar Never   Eat to prevent acid reflux or stomach pain Never   Worry about not having enough food to eat Never   I eat when I am depressed Never   I eat when I am stressed A Few Times a Week   I eat when I am bored A Few Times a Week   I eat when I am anxious Never   I eat when I am happy or as a reward A Few Times a Week   I feel hungry all the time even if I just have eaten Never   Feeling full is important to me Less Than Weekly   I finish all the food on my plate even if I am already full A Few Times a Week   I can't resist eating delicious food or walk past the good food/smell A Few Times a Week   I eat/snack without noticing that I am eating A Few Times a Week   I eat when I am preparing the meal Never   I eat more than usual when I see others eating Never   I have trouble not eating sweets, ice cream, cookies, or chips if they are around the house A Few Times a Week   I think about food all day A Few Times a Week   What foods, if any, do you crave? Sweets/Candy/Chocolate   Please list any other foods you crave? Icecream           6/24/2024     6:38 PM   Amount of Food   I feel out of control when eating Weekly   I eat a large amount of food, like a loaf of bread, a box of cookies, a pint/quart of ice cream, all at once Weekly   I eat a large amount of food even when I am not hungry Monthly   I eat rapidly Monthly   I eat alone because I feel embarrassed and do not want others to see how much I have eaten Weekly   I eat until I am uncomfortably full Never   I feel bad, disgusted, or guilty after I overeat Almost Everyday       Physical Activity:  Just bought an outdoor bike. Enjoys biking in her neighborhood.          6/24/2024     6:38 PM   Activity/Exercise History   How much of a typical 12 hour day do you spend sitting? Half the Day   How much of a typical 12 hour day do you spend lying down? Less Than Half the Day   How much of a typical  "day do you spend walking/standing? Half the Day   How many hours (not including work) do you spend on the TV/Video Games/Computer/Tablet/Phone? 4-5 Hours   How many times a week are you active for the purpose of exercise? Once a Week   What keeps you from being more active? Other   How many total minutes do you spend doing some activity for the purpose of exercising when you exercise? 15-30 Minutes       Nutrition Prescription  Recommended energy/nutrient modification.    Nutrition Diagnosis  Obesity r/t long history of positive energy balance aeb BMI >30.    Nutrition Intervention  Materials/education provided on hypocaloric diet for weight loss. Discussed Volumetric eating to help satiety level on fewer calories; portion control and healthy food choices (Plate Method and Volumetrics handouts), lower calorie snack choices, meal and snack planning tips and resources. Provided education on nutrition considerations when starting GLP1 medication including, changes in meal/snack volumes; meeting protein, hydration and micronutrient needs; limiting high-fat foods; and diet/lifestyle strategies for preventing constipation.  Patient demonstrates understanding.  Co-developed goals to work towards.   Provided pt with list of goals and resources below via Conjure.     Expected Engagement: good  Follow-Up Plans: meal/snack planning     Nutrition Goals  1) Limit ice cream to once weekly   - Could do sugar-free pudding instead   2) Can try another protein shake in afternoon to help with dinner portions.   3) Increase biking. Set a goal for number of days per week to bike. Record progress on calendar or journal.       Quick/Easy Protein Sources:  Hard boiled eggs  Part-skim cheese sticks  Baby Bell cheese rounds  Low-fat/low-sugar Greek yogurt  Low-fat cottage cheese  Lean deli meat (chicken/turkey/ham)  Roasted chickpeas or lentils  Nuts   Turkey meat stick  Protein shake/bar  \"P3\" snack (cheese, nuts, deli meat)  Aldi's " "\"Protein Bread\"   \"Egglife\" egg white wrap    Tuna/salmon can/packet       The Plate Method  [email or web address]    Protein Sources   [email or web address]     Carbohydrates  [email or web address]     Mindful Eating  [email or web address]     Summary of Volumetrics Eating Plan  [email or web address]     Healthy Recipe Resources:  Books:  \"The Volumetrics Eating Plan\" by Brenda Foote, Ph.D.  \"Cooking that Counts\" by editors of Kabooza  \"Calorie Smart Meals\" cookbook by Better Homes and GVISP 1s (200-500 calorie meals)    Websites:  www.Mind Palette  https://www.Gogobot/  www.Roadstruck  https://www.diabetesfoodhub.org/all-recipes.html  Https://www.Al[place] Printer Serviceplate.gov/myplatekitchen  Recipes by Season: https://snaped.Coupstas.usda.gov/recipes-menus   Video Meal Prep: Https://www.youtube.com/c/jamie   Meal Plans: Eatthismuch.com   DASH Diet: https://www.nhlbi.nih.gov/education/dash-eating-plan  Whole Grains Stebbins: https://wholegrainscouncil.org/recipes      Cultural Cuisines:  Various Regions of African Cuisine: https://www.Key Ingredient Corporation."RiverGlass, Inc."/recipes/31646/cuisines-regions//   Cuisine: https://www.PublicEarth.org/knowledge-center/recipes/  Mexican and Vegan Cuisine:   https://Baobab."RiverGlass, Inc."/  https://GÃ¼dpod."RiverGlass, Inc."/recipe-index/  Chinese Cuisine: https://www.Scribble Press."RiverGlass, Inc."/  South Asian Cuisine:  Jaycee Figueroa on social media- South  high-protein/low-calorie meal/food ideas  Mariel Henderson RD, CDCES, plant-based [facility]  Resources: https://www.Biottery/    Apps:  KILTR veronika (or website, DataFlyte.com)   SpendSmartEatSmart           Follow-Up:  1 month, PRN    Time spent with patient: 32 minutes.  Chantell Covington RD, LD      "
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
## 2024-06-27 NOTE — NURSING NOTE
Is the patient currently in the state of [place]? YES    Visit mode:VIDEO    If the visit is dropped, the patient can be reconnected by: VIDEO VISIT: Text to cell phone:   Telephone Information:   Mobile [phone]       Will anyone else be joining the visit? NO  (If patient encounters technical issues they should call [phone]    How would you like to obtain your AVS? MyChart    Are changes needed to the allergy or medication list? No    Are refills needed on medications prescribed by this physician? NO    Reason for visit: Consult    Muriel RUSSO

## 2024-06-27 NOTE — PATIENT INSTRUCTIONS
"Thank you for allowing us the privilege of caring for you. We hope we provided you with the excellent service you deserve.   Please let us know if there is anything else we can do for you so that we can be sure you are completely satisfied with your care experience.    To ensure the quality of our services you may be receiving a patient satisfaction survey from an independent patient satisfaction monitoring company.    The greatest compliment you can give is a \"Likely to Recommend\"    Your visit was with Juliet Emanuel PA-C today.    Instructions per today's visit:     Jamil Upton, it was great to visit with you today.  Here is a review of our visit.  If our clinic scheduler is not able to reach you please call [phone] to schedule your next appointments.    Plan  Will reach out to Floresita Salinas regarding switching back to zepbound due to improved response, alternatively can increase to wegovy 1.7mg if there are issues with zepbound insurance or supply   [facility] pharmacy visit with Floresita Salinas on 8/9/24    Follow up with Juliet in 6 months   Dietician appointment today        Information about Video Visits with [employer]: video visit information  _________________________________________________________________________________________________________________________________________________________  If you are asked by your clinic team to have your blood pressure checked:  [facility] do offer several locations for blood pressure checks. Please follow the below link to schedule an appointment. Scheduling an appointment at the pharmacy for a blood pressure check is now preferred.    Appointment Plus (appointment-plus.[employer])  _________________________________________________________________________________________________________________________________________________________  Important contact and scheduling information:  Please call our contact center at [phone] to schedule your next " appointments.  To find a lab location near you, please call [phone].  For any nursing questions or concerns call Shanti Barrios LPN at [phone] or Cinthya Zapata RN at [phone]  Please call during clinic hours Monday through Friday 8:00a - 4:00p if you have questions or you can contact us via [employer] at anytime and we will reply during clinic hours.    Lab results will be communicated through My Chart or letter (if My Chart not used). Please call the clinic if you have not received communication after 1 week or if you have any questions.?  Clinic Fax: [phone]    _________________________________________________________________________________________________________________________________________________________  Meal Replacement Products:    Here is the link to our new e-store where you can purchase our meal replacement products     [employer] E-Store  [email or web address]/store    The one week starter kit is a great way to sample a variety of products and see what works for you.    If you want more information about the product go to: [email or web address]    If you are an employee or [facility] or  [employer] please contact your care team for a 10% estore discount    Free Shipping for orders over $75     Benefits of meal replacements products:    Portion and calorie control  Improved nutrition  Structured eating  Simplified food choices  Avoid contact with trigger foods  _________________________________________________________________________________________________________________________________________________________  Interested in working with a health ?  Health coaches work with you to improve your overall health and wellbeing.  They look at the whole person, and may involve discussion of different areas of life, including, but not limited to the four pillars of health (sleep, exercise, nutrition, and stress management). Discuss with  your care team if you would like to start working a health .  Health Coaching-3 Pack: Schedule by calling [phone]    $99 for three health coaching visits    Visits may be done in person or via phone    Coaching is a partnership between the  and the client; Coaches do not prescribe or diagnose    Coaching helps inspire the client to reach his/her personal goals   _________________________________________________________________________________________________________________________________________________________  24 Week Healthy Lifestyle Plan:    Our mission in the 24-week Healthy Lifestyle Plan is to provide you with individualized care by giving you the tools, education and support you need to lose weight and maintain a healthy lifestyle. In your 24-week journey, you ll be supported by a dedicated weight loss team that includes registered dietitians, medical weight management providers, health coaches, and nurses -- all with special expertise in weight loss -- to help you every step of the way.     Monthly meetings with your registered dietician or medical weight management provider help to review your progress, update your care plan, and make any adjustments needed to ensure success. Between these visits, weekly and bi-weekly health  visits will help you focus on the four pillars of weight loss -- stress, sleep, nutrition, and exercise -- and how you can best adapt each to achieve sustainable weight loss results.    In addition, you will be given exclusive access to online wellbeing classes through [employer].  Your initial visit will be with a medical weight management provider who will help to understand your weight loss goals and ensure this program is the right fit for you. Please let our team know if you are interested in the 24 week plan by sending a message to your care team or calling [phone] to  schedule.  _________________________________________________________________________________________________________________________________________________________  __________  [facility] Get Moving Program  Our team of physical therapists is trained to help you understand and take control of your condition. They will perform a thorough evaluation to determine your ability for activity and develop a customized plan to fit your goals and physical ability.  Scheduling: Unsure if the Get Moving program is right for you? Discuss the program with your medical provider or diabetes educator. You can also call us at [phone] to ask questions or schedule an appointment.   [facility] Get Moving Program  ____________________________________________________________________________________________________________________________________________________________________________   [employer] Diabetes Prevention Program (DPP)  If you have prediabetes and Medicare please contact us via [employer] to learn more about the Diabetes Prevention Program (DPP)  Program Details:   [employer] offers the year-long Diabetes Prevention Program (DPP). The program helps you to make lifestyle changes that prevent or delay type 2 diabetes by supporting healthy eating, increased physical activity, stress reduction and use of coping skills.   On average, previous [facility] DPP cohorts have lost and maintained at least 5% of their starting weight throughout the program and averaged more than 150 minutes of physical activity per week.  Participants meet weekly for one-hour group sessions over sixteen weeks, every other week for the next 8 weeks, and monthly for the last six months.   A year-long maintenance program is also available for participants who complete the first year.   Location & Cost:   During the COVID-19 Public Health Emergency, the program is offered virtually. When in-person classes can resume, they  will be held at [facility].  For people with Medicare, the program is covered in full. A self-pay option will also be available for those with non-Medicare insurance plans.   ______________________________________________________________________________________________________________________________________________________________________________________________________________________________    To work with a Behavioral Health Psychologist:    Call to schedule:    Jovani Pierson - [phone]  Dinesh Navarro - [phone]  Luciana Narayanan - [phone]  Angelique Mtz - [phone]   Soni Santiago PhD (cannot accept Medicare) [phone]        Thank you,   [facility] Comprehensive Weight Management Team

## 2024-06-28 ENCOUNTER — TELEPHONE (OUTPATIENT)
Dept: ENDOCRINOLOGY | Facility: CLINIC | Age: 34
End: 2024-06-28
Payer: COMMERCIAL

## 2024-06-28 DIAGNOSIS — F31.9 BIPOLAR 1 DISORDER  (CMD): ICD-10-CM

## 2024-06-28 DIAGNOSIS — F90.0 ATTENTION DEFICIT HYPERACTIVITY DISORDER (ADHD), PREDOMINANTLY INATTENTIVE TYPE: ICD-10-CM

## 2024-06-28 DIAGNOSIS — F41.1 GAD (GENERALIZED ANXIETY DISORDER): ICD-10-CM

## 2024-06-28 RX ORDER — ATOMOXETINE 80 MG/1
80 CAPSULE ORAL DAILY
Qty: 28 CAPSULE | Refills: 11 | Status: SHIPPED | OUTPATIENT
Start: 2024-06-28

## 2024-06-28 RX ORDER — CLOZAPINE 100 MG/1
300 TABLET ORAL AT BEDTIME
Qty: 84 TABLET | Refills: 11 | Status: SHIPPED | OUTPATIENT
Start: 2024-06-28

## 2024-06-28 RX ORDER — PROPRANOLOL HYDROCHLORIDE 20 MG/1
20 TABLET ORAL 2 TIMES DAILY
Qty: 56 TABLET | Refills: 11 | Status: SHIPPED | OUTPATIENT
Start: 2024-06-28

## 2024-06-28 NOTE — TELEPHONE ENCOUNTER
General Call    Contacts       Contact Date/Time Type Contact Phone/Fax    06/28/2024 01:29 PM CDT Phone (Incoming) [facility] - [place], [place] - [address] (Pharmacy) [phone]          Reason for Call:  call back     What are your questions or concerns: [facility] would like a call back regarding Zebound    [phone]

## 2024-06-28 NOTE — TELEPHONE ENCOUNTER
Prior Authorization Approval        RX sent to pharmacy    Medication: ZEPBOUND 5 MG/0.5ML SC SOAJ  Authorization Effective Date: 6/28/2024  Authorization Expiration Date: 12/25/2024  Approved Dose/Quantity: 2  Reference #: [id number]   Insurance Company: [facility] - Phone [phone] Fax [phone]  Expected CoPay: $    CoPay Card Available:      Financial Assistance Needed:    Which Pharmacy is filling the prescription: [facility] - [place], [place] - [address]  Pharmacy Notified: yes  Patient Notified: yes

## 2024-06-28 NOTE — TELEPHONE ENCOUNTER
PA Initiation    Medication: ZEPBOUND 5 MG/0.5ML SC SOAJ  Insurance Company: [employer] - Phone [phone] Fax [phone]  Pharmacy Filling the Rx: [facility] - [place], [place] - [address]  Filling Pharmacy Phone:    Filling Pharmacy Fax:    Start Date: 6/28/2024

## 2024-07-01 NOTE — TELEPHONE ENCOUNTER
Called and spoke with pharmacy in regards to Zepbound. This was already addressed. Order filled and out for shipment tomorrow. No further questions.

## 2024-07-02 ENCOUNTER — BEHAVIORAL HEALTH (OUTPATIENT)
Dept: BEHAVIORAL HEALTH | Age: 34
End: 2024-07-02

## 2024-07-02 DIAGNOSIS — F41.1 GAD (GENERALIZED ANXIETY DISORDER): ICD-10-CM

## 2024-07-02 DIAGNOSIS — F90.0 ATTENTION DEFICIT HYPERACTIVITY DISORDER (ADHD), PREDOMINANTLY INATTENTIVE TYPE: ICD-10-CM

## 2024-07-02 DIAGNOSIS — F42.2 MIXED OBSESSIONAL THOUGHTS AND ACTS: ICD-10-CM

## 2024-07-02 DIAGNOSIS — F31.9 BIPOLAR 1 DISORDER  (CMD): Primary | ICD-10-CM

## 2024-07-08 ENCOUNTER — APPOINTMENT (OUTPATIENT)
Dept: BEHAVIORAL HEALTH | Age: 34
End: 2024-07-08

## 2024-07-08 DIAGNOSIS — F31.9 BIPOLAR 1 DISORDER  (CMD): Primary | ICD-10-CM

## 2024-07-08 DIAGNOSIS — F90.0 ATTENTION DEFICIT HYPERACTIVITY DISORDER (ADHD), PREDOMINANTLY INATTENTIVE TYPE: ICD-10-CM

## 2024-07-08 DIAGNOSIS — F41.1 GAD (GENERALIZED ANXIETY DISORDER): ICD-10-CM

## 2024-07-08 DIAGNOSIS — F42.2 MIXED OBSESSIONAL THOUGHTS AND ACTS: ICD-10-CM

## 2024-07-11 ENCOUNTER — E-ADVICE (OUTPATIENT)
Dept: BEHAVIORAL HEALTH | Age: 34
End: 2024-07-11

## 2024-07-12 ENCOUNTER — TELEPHONE (OUTPATIENT)
Dept: ENDOCRINOLOGY | Facility: CLINIC | Age: 34
End: 2024-07-12
Payer: COMMERCIAL

## 2024-07-12 NOTE — TELEPHONE ENCOUNTER
Patient confirmed scheduled appointment:  Date: 1/7/24  Time: ***  Visit type: ***  Provider: ***  Location: ***  Testing/imaging: ***  Additional notes: ***

## 2024-07-12 NOTE — TELEPHONE ENCOUNTER
Patient confirmed scheduled appointment:  Date: 1/7/25  Time: 1 pm  Visit type: RET MWM  Provider: Juliet Emanuel  Location: virtual  Testing/imaging: n/a  Additional notes: Pt confirmed will be in [place] for appt

## 2024-07-15 ENCOUNTER — TELEPHONE (OUTPATIENT)
Dept: INTERNAL MEDICINE | Age: 34
End: 2024-07-15

## 2024-07-15 ENCOUNTER — APPOINTMENT (OUTPATIENT)
Dept: BEHAVIORAL HEALTH | Age: 34
End: 2024-07-15

## 2024-07-15 DIAGNOSIS — F31.9 BIPOLAR 1 DISORDER  (CMD): Primary | ICD-10-CM

## 2024-07-15 DIAGNOSIS — U07.1 COVID-19 VIRUS INFECTION: Primary | ICD-10-CM

## 2024-07-15 DIAGNOSIS — F42.2 MIXED OBSESSIONAL THOUGHTS AND ACTS: ICD-10-CM

## 2024-07-15 DIAGNOSIS — F90.0 ATTENTION DEFICIT HYPERACTIVITY DISORDER (ADHD), PREDOMINANTLY INATTENTIVE TYPE: ICD-10-CM

## 2024-07-15 DIAGNOSIS — F41.1 GAD (GENERALIZED ANXIETY DISORDER): ICD-10-CM

## 2024-07-16 ENCOUNTER — BEHAVIORAL HEALTH (OUTPATIENT)
Dept: BEHAVIORAL HEALTH | Age: 34
End: 2024-07-16

## 2024-07-16 ENCOUNTER — E-ADVICE (OUTPATIENT)
Dept: INTERNAL MEDICINE | Age: 34
End: 2024-07-16

## 2024-07-16 DIAGNOSIS — F42.2 MIXED OBSESSIONAL THOUGHTS AND ACTS: ICD-10-CM

## 2024-07-16 DIAGNOSIS — F90.0 ATTENTION DEFICIT HYPERACTIVITY DISORDER (ADHD), PREDOMINANTLY INATTENTIVE TYPE: ICD-10-CM

## 2024-07-16 DIAGNOSIS — F31.9 BIPOLAR 1 DISORDER  (CMD): ICD-10-CM

## 2024-07-16 DIAGNOSIS — F41.1 GAD (GENERALIZED ANXIETY DISORDER): ICD-10-CM

## 2024-07-16 DIAGNOSIS — G47.09 INITIAL INSOMNIA: Primary | ICD-10-CM

## 2024-07-16 PROCEDURE — 99215 OFFICE O/P EST HI 40 MIN: CPT

## 2024-07-16 RX ORDER — CLOZAPINE 50 MG/1
50 TABLET ORAL EVERY MORNING
Qty: 90 TABLET | Refills: 5 | Status: SHIPPED | OUTPATIENT
Start: 2024-07-16

## 2024-07-16 RX ORDER — TRAZODONE HYDROCHLORIDE 50 MG/1
25-50 TABLET ORAL NIGHTLY PRN
Qty: 30 TABLET | Refills: 3 | Status: SHIPPED | OUTPATIENT
Start: 2024-07-16

## 2024-07-22 ENCOUNTER — APPOINTMENT (OUTPATIENT)
Dept: BEHAVIORAL HEALTH | Age: 34
End: 2024-07-22

## 2024-07-22 ENCOUNTER — TELEPHONE (OUTPATIENT)
Dept: BEHAVIORAL HEALTH | Age: 34
End: 2024-07-22

## 2024-07-22 DIAGNOSIS — F31.9 BIPOLAR 1 DISORDER  (CMD): Primary | ICD-10-CM

## 2024-07-22 DIAGNOSIS — F41.1 GAD (GENERALIZED ANXIETY DISORDER): ICD-10-CM

## 2024-07-22 DIAGNOSIS — F90.0 ATTENTION DEFICIT HYPERACTIVITY DISORDER (ADHD), PREDOMINANTLY INATTENTIVE TYPE: ICD-10-CM

## 2024-07-22 DIAGNOSIS — Z79.899 MEDICATION MANAGEMENT: ICD-10-CM

## 2024-07-22 DIAGNOSIS — F42.2 MIXED OBSESSIONAL THOUGHTS AND ACTS: ICD-10-CM

## 2024-07-23 ENCOUNTER — LAB SERVICES (OUTPATIENT)
Dept: LAB | Age: 34
End: 2024-07-23

## 2024-07-23 DIAGNOSIS — Z79.899 MEDICATION MANAGEMENT: ICD-10-CM

## 2024-07-23 DIAGNOSIS — F31.9 BIPOLAR 1 DISORDER  (CMD): ICD-10-CM

## 2024-07-23 LAB
BASOPHILS # BLD: 0.1 K/MCL (ref 0–0.3)
BASOPHILS NFR BLD: 0 %
DEPRECATED RDW RBC: 46.3 FL (ref 39–50)
EOSINOPHIL # BLD: 0 K/MCL (ref 0–0.5)
EOSINOPHIL NFR BLD: 0 %
ERYTHROCYTE [DISTWIDTH] IN BLOOD: 19 % (ref 11–15)
HCT VFR BLD CALC: 34.5 % (ref 36–46.5)
HGB BLD-MCNC: 10.1 G/DL (ref 12–15.5)
IMM GRANULOCYTES # BLD AUTO: 0.2 K/MCL (ref 0–0.2)
IMM GRANULOCYTES # BLD: 1 %
LYMPHOCYTES # BLD: 2.9 K/MCL (ref 1–4.8)
LYMPHOCYTES NFR BLD: 23 %
MCH RBC QN AUTO: 20.7 PG (ref 26–34)
MCHC RBC AUTO-ENTMCNC: 29.3 G/DL (ref 32–36.5)
MCV RBC AUTO: 70.7 FL (ref 78–100)
MONOCYTES # BLD: 0.6 K/MCL (ref 0.3–0.9)
MONOCYTES NFR BLD: 5 %
NEUTROPHILS # BLD: 9 K/MCL (ref 1.8–7.7)
NEUTROPHILS NFR BLD: 71 %
NRBC BLD MANUAL-RTO: 0 /100 WBC
PLATELET # BLD AUTO: 320 K/MCL (ref 140–450)
RBC # BLD: 4.88 MIL/MCL (ref 4–5.2)
WBC # BLD: 12.7 K/MCL (ref 4.2–11)

## 2024-07-23 PROCEDURE — 85025 COMPLETE CBC W/AUTO DIFF WBC: CPT | Performed by: INTERNAL MEDICINE

## 2024-07-23 PROCEDURE — 36415 COLL VENOUS BLD VENIPUNCTURE: CPT | Performed by: INTERNAL MEDICINE

## 2024-07-24 ENCOUNTER — TELEPHONE (OUTPATIENT)
Dept: BEHAVIORAL HEALTH | Age: 34
End: 2024-07-24

## 2024-07-26 ENCOUNTER — HOSPITAL ENCOUNTER (OUTPATIENT)
Age: 34
Discharge: HOME | End: 2024-07-26

## 2024-07-26 ENCOUNTER — APPOINTMENT (OUTPATIENT)
Dept: BEHAVIORAL HEALTH | Age: 34
End: 2024-07-26

## 2024-07-26 VITALS
HEIGHT: 63 IN | WEIGHT: 293 LBS | DIASTOLIC BLOOD PRESSURE: 79 MMHG | HEART RATE: 110 BPM | BODY MASS INDEX: 51.91 KG/M2 | SYSTOLIC BLOOD PRESSURE: 133 MMHG | TEMPERATURE: 97.7 F | OXYGEN SATURATION: 97 % | RESPIRATION RATE: 16 BRPM

## 2024-07-26 DIAGNOSIS — L02.419 ABSCESS OF THIGH: Primary | ICD-10-CM

## 2024-07-26 LAB
GRAM STN SPEC: NORMAL

## 2024-07-26 PROCEDURE — 10002519 INCISION AND DRAINAGE

## 2024-07-26 PROCEDURE — 87205 SMEAR GRAM STAIN: CPT

## 2024-07-26 PROCEDURE — 99211 OFF/OP EST MAY X REQ PHY/QHP: CPT

## 2024-07-26 RX ORDER — LIDOCAINE HYDROCHLORIDE AND EPINEPHRINE 10; 10 MG/ML; UG/ML
1 INJECTION, SOLUTION INFILTRATION; PERINEURAL ONCE
Status: COMPLETED | OUTPATIENT
Start: 2024-07-26 | End: 2024-07-26

## 2024-07-26 SDOH — SOCIAL STABILITY: SOCIAL INSECURITY: HOW OFTEN DOES ANYONE, INCLUDING FAMILY AND FRIENDS, PHYSICALLY HURT YOU?: NEVER

## 2024-07-26 SDOH — SOCIAL STABILITY: SOCIAL INSECURITY: HOW OFTEN DOES ANYONE, INCLUDING FAMILY AND FRIENDS, INSULT OR TALK DOWN TO YOU?: NEVER

## 2024-07-26 SDOH — SOCIAL STABILITY: SOCIAL INSECURITY: HOW OFTEN DOES ANYONE, INCLUDING FAMILY AND FRIENDS, SCREAM OR CURSE AT YOU?: NEVER

## 2024-07-26 SDOH — SOCIAL STABILITY: SOCIAL INSECURITY: HOW OFTEN DOES ANYONE, INCLUDING FAMILY AND FRIENDS, THREATEN YOU WITH HARM?: NEVER

## 2024-07-26 ASSESSMENT — ENCOUNTER SYMPTOMS
CHILLS: 0
NAUSEA: 0
BACK PAIN: 0
FEVER: 0

## 2024-07-26 ASSESSMENT — PAIN DESCRIPTION - PAIN TYPE: TYPE: ACUTE PAIN

## 2024-07-26 ASSESSMENT — PAIN SCALES - GENERAL: PAINLEVEL_OUTOF10: 4

## 2024-07-28 LAB
BACTERIA SPEC AEROBE CULT: NORMAL
GRAM STN SPEC: NORMAL

## 2024-07-30 ENCOUNTER — OFFICE VISIT (OUTPATIENT)
Dept: INTERNAL MEDICINE | Age: 34
End: 2024-07-30

## 2024-07-30 VITALS
HEART RATE: 99 BPM | DIASTOLIC BLOOD PRESSURE: 76 MMHG | OXYGEN SATURATION: 99 % | SYSTOLIC BLOOD PRESSURE: 114 MMHG | HEIGHT: 63 IN | WEIGHT: 292.5 LBS | BODY MASS INDEX: 51.83 KG/M2

## 2024-07-30 DIAGNOSIS — K76.0 FATTY LIVER: Primary | ICD-10-CM

## 2024-07-30 DIAGNOSIS — L02.416 ABSCESS OF LEFT THIGH: ICD-10-CM

## 2024-07-30 PROCEDURE — 99214 OFFICE O/P EST MOD 30 MIN: CPT | Performed by: INTERNAL MEDICINE

## 2024-07-30 ASSESSMENT — PATIENT HEALTH QUESTIONNAIRE - PHQ9
SUM OF ALL RESPONSES TO PHQ9 QUESTIONS 1 AND 2: 0
2. FEELING DOWN, DEPRESSED OR HOPELESS: NOT AT ALL
1. LITTLE INTEREST OR PLEASURE IN DOING THINGS: NOT AT ALL
CLINICAL INTERPRETATION OF PHQ2 SCORE: NO FURTHER SCREENING NEEDED
SUM OF ALL RESPONSES TO PHQ9 QUESTIONS 1 AND 2: 0

## 2024-08-05 ENCOUNTER — APPOINTMENT (OUTPATIENT)
Dept: BEHAVIORAL HEALTH | Age: 34
End: 2024-08-05

## 2024-08-05 DIAGNOSIS — F31.9 BIPOLAR 1 DISORDER  (CMD): Primary | ICD-10-CM

## 2024-08-05 DIAGNOSIS — F41.1 GAD (GENERALIZED ANXIETY DISORDER): ICD-10-CM

## 2024-08-05 DIAGNOSIS — F42.2 MIXED OBSESSIONAL THOUGHTS AND ACTS: ICD-10-CM

## 2024-08-05 DIAGNOSIS — F90.0 ATTENTION DEFICIT HYPERACTIVITY DISORDER (ADHD), PREDOMINANTLY INATTENTIVE TYPE: ICD-10-CM

## 2024-08-08 ENCOUNTER — APPOINTMENT (OUTPATIENT)
Dept: INTERNAL MEDICINE | Age: 34
End: 2024-08-08

## 2024-08-09 ENCOUNTER — VIRTUAL VISIT (OUTPATIENT)
Dept: CARDIOLOGY | Facility: CLINIC | Age: 34
End: 2024-08-09
Payer: COMMERCIAL

## 2024-08-09 VITALS — WEIGHT: 290 LBS | BODY MASS INDEX: 52.2 KG/M2

## 2024-08-09 DIAGNOSIS — D50.0 IRON DEFICIENCY ANEMIA DUE TO CHRONIC BLOOD LOSS: ICD-10-CM

## 2024-08-09 DIAGNOSIS — E66.01 CLASS 3 SEVERE OBESITY DUE TO EXCESS CALORIES WITH SERIOUS COMORBIDITY AND BODY MASS INDEX (BMI) OF 50.0 TO 59.9 IN ADULT (H): Primary | ICD-10-CM

## 2024-08-09 DIAGNOSIS — E66.813 CLASS 3 SEVERE OBESITY DUE TO EXCESS CALORIES WITH SERIOUS COMORBIDITY AND BODY MASS INDEX (BMI) OF 50.0 TO 59.9 IN ADULT (H): Primary | ICD-10-CM

## 2024-08-09 DIAGNOSIS — K76.0 FATTY LIVER: ICD-10-CM

## 2024-08-09 RX ORDER — TRAZODONE HYDROCHLORIDE 50 MG/1
50 TABLET, FILM COATED ORAL
COMMUNITY
Start: 2024-07-16

## 2024-08-09 NOTE — PROGRESS NOTES
Medication Therapy Management (MTM) Encounter    ASSESSMENT:                            Medication Adherence/Access: No issues identified    Weight Management/NAFLD:  Weight loss has progressed since starting Wegovy and Zepbound. Weight has been stable since the end of June. She's tolerating Zepbound well. Will increase the zepbound dose to 7.5 mg/week to see if she gets more benefit. She also set a goal to start riding her bike 15 minutes two times per week. Has follow-up with registered dietitian as well. She's interested to know her daily calorie goal.    Iron deficiency anemia:   Hemoglobin has improved but still low. recommend restarting ferrous sulfate either once daily or every other day. Can take at same time as other medications. If she continues to have a hard time taking it, could consider an IV iron infusion    PLAN:                            Increase zepbound to 7.5 mg once weekly   If you are tolerating this well and would like to increase the dose again after 1-2 months, let me know.  Start riding your bike 15 minutes two times per week   Re-start taking ferrous sulfate once daily or every other day for low blood counts/anemia    Follow-up: 3 months     SUBJECTIVE/OBJECTIVE:                          DERICK Upton is a 33 year old female called for a follow-up visit.       Reason for visit: zepbound follow-up.    Allergies/ADRs: Reviewed in chart  Past Medical History: Reviewed in chart  Tobacco: She reports that she has never smoked. She has never used smokeless tobacco.  Alcohol: rare  [employer] employee insurance  Medication Adherence/Access:  Has been switching between wegovy/zepbound due to backorder issues. Now back on zepbound.    Weight Management /NAFLD:  Zepbound 5 mg once weekly     Has primary care provider in [place]. Following in weight management clinic for Zepbound. Feels the effects of Zepbound wearing off at the end of the week. Still has cravings. Wonders if she should add  "bupropion.     Diet/Eating Habits: has reduced eating out to just once per week. Has recently added in two protein shakes per day between meals to help reduce portions with dinner. Likes birds-eye power blends 1-2 days per week - has beans and rice; 19 g protein. Choosing healthier snacks: quest chips and cheeze-its, turkey sticks, nuts. Struggle with sweets cravings. Worse when she has her period.   Exercise/Activity: has been walking around the mall for an hour about once per week. Has been walking about 3.5 miles at work each day.   Tried/Failed/Contraindicated Medications:   No hx pancreatitis/thyroid cancer  On clozapine which is associated with a significant amount of weight gain.   Metformin: diarrhea   Wegovy: tolerated okay but zepbound works better     Initial Consult Weight: 309.5 lbs  A1c: 5.3% - 12/29/23 per care everywhere     Current weight today: 290 lbs 0 oz  Cumulative Weight Loss: -19.5 lb, -6.3% from baseline    Wt Readings from Last 4 Encounters:   08/09/24 131.5 kg (290 lb)   06/27/24 131.5 kg (290 lb)   05/24/24 133.4 kg (294 lb)   02/23/24 137.2 kg (302 lb 8 oz)     Estimated body mass index is 52.2 kg/m  as calculated from the following:    Height as of 6/27/24: 1.588 m (5' 2.5\").    Weight as of this encounter: 131.5 kg (290 lb).    Iron deficiency anemia:   Ferrous sulfate 325 mg once daily     She's not taking this because she forgets. No side effects.     Today's Vitals: Wt 131.5 kg (290 lb)   BMI 52.20 kg/m    ----------------    I spent 23 minutes with this patient today. All changes were made via collaborative practice agreement with Juliet Emanuel PA-C and Marilynn Goldman CNP . A copy of the visit note was provided to the patient's provider(s).    A summary of these recommendations was sent via [employer].    Telemedicine Visit Details  Type of service:  Telephone visit  Start Time:  10:03 am  End Time: 10:26 AM     Medication Therapy Recommendations  Class 3 severe obesity due to excess " calories with serious comorbidity and body mass index (BMI) of 50.0 to 59.9 in adult (H)    Current Medication: tirzepatide-Weight Management (ZEPBOUND) 5 MG/0.5ML prefilled pen   Rationale: Dose too low - Dosage too low - Effectiveness   Recommendation: Increase Dose - Zepbound 7.5 MG/0.5ML Soaj   Status: Accepted per CPA         Iron deficiency anemia    Current Medication: ferrous sulfate (FEROSUL) 325 (65 Fe) MG tablet   Rationale: Patient forgets to take - Adherence - Adherence   Recommendation: Provide Education   Status: Patient Agreed - Adherence/Education

## 2024-08-09 NOTE — LETTER
8/9/2024      RE: Ben Upton  [address] [address]       Dear Colleague,    Thank you for the opportunity to participate in the care of your patient, Ben Upton, at the [facility]. Please see a copy of my visit note below.    Virtual Visit Details    Type of service:  Telephone Visit   Phone call duration: 23 minutes   Originating Location (pt. Location): Home    Distant Location (provider location):  Off-site    Medication Therapy Management (MTM) Encounter    ASSESSMENT:                            Medication Adherence/Access: No issues identified    Weight Management/NAFLD:  Weight loss has progressed since starting Wegovy and Zepbound. Weight has been stable since the end of June. She's tolerating Zepbound well. Will increase the zepbound dose to 7.5 mg/week to see if she gets more benefit. She also set a goal to start riding her bike 15 minutes two times per week. Has follow-up with registered dietitian as well. She's interested to know her daily calorie goal.    Iron deficiency anemia:   Hemoglobin has improved but still low. recommend restarting ferrous sulfate either once daily or every other day. Can take at same time as other medications. If she continues to have a hard time taking it, could consider an IV iron infusion    PLAN:                            Increase zepbound to 7.5 mg once weekly   If you are tolerating this well and would like to increase the dose again after 1-2 months, let me know.  Start riding your bike 15 minutes two times per week   Re-start taking ferrous sulfate once daily or every other day for low blood counts/anemia    Follow-up: 3 months     SUBJECTIVE/OBJECTIVE:                          DERICK Upton is a 33 year old female called for a follow-up visit.       Reason for visit: zepbound follow-up.    Allergies/ADRs: Reviewed in chart  Past Medical History:  "Reviewed in chart  Tobacco: She reports that she has never smoked. She has never used smokeless tobacco.  Alcohol: rare  [employer] employee insurance  Medication Adherence/Access:  Has been switching between wegovy/zepbound due to backorder issues. Now back on zepbound.    Weight Management/NAFLD:  Zepbound 5 mg once weekly     Has primary care provider in [place]. Following in weight management clinic for Zepbound. Feels the effects of Zepbound wearing off at the end of the week. Still has cravings. Wonders if she should add bupropion.     Diet/Eating Habits: has reduced eating out to just once per week. Has recently added in two protein shakes per day between meals to help reduce portions with dinner. Likes birds-eye power blends 1-2 days per week - has beans and rice; 19 g protein. Choosing healthier snacks: quest chips and cheeze-its, turkey sticks, nuts. Struggle with sweets cravings. Worse when she has her period.   Exercise/Activity: has been walking around the mall for an hour about once per week. Has been walking about 3.5 miles at work each day.   Tried/Failed/Contraindicated Medications:   No hx pancreatitis/thyroid cancer  On clozapine which is associated with a significant amount of weight gain.   Metformin: diarrhea   Wegovy: tolerated okay but zepbound works better     Initial Consult Weight: 309.5 lbs  A1c: 5.3% - 12/29/23 per care everywhere     Current weight today: 290 lbs 0 oz  Cumulative Weight Loss: -19.5 lb, -6.3% from baseline    Wt Readings from Last 4 Encounters:   08/09/24 131.5 kg (290 lb)   06/27/24 131.5 kg (290 lb)   05/24/24 133.4 kg (294 lb)   02/23/24 137.2 kg (302 lb 8 oz)     Estimated body mass index is 52.2 kg/m  as calculated from the following:    Height as of 6/27/24: 1.588 m (5' 2.5\").    Weight as of this encounter: 131.5 kg (290 lb).    Iron deficiency anemia:   Ferrous sulfate 325 mg once daily     She's not taking this because she forgets. No side effects.     Today's " Vitals: Wt 131.5 kg (290 lb)   BMI 52.20 kg/m    ----------------    I spent 23 minutes with this patient today. All changes were made via collaborative practice agreement with Juliet Emanuel PA-C and Marilynn Goldman CNP . A copy of the visit note was provided to the patient's provider(s).    A summary of these recommendations was sent via [employer].    Telemedicine Visit Details  Type of service:  Telephone visit  Start Time:  10:03 am  End Time: 10:26 AM     Medication Therapy Recommendations  Class 3 severe obesity due to excess calories with serious comorbidity and body mass index (BMI) of 50.0 to 59.9 in adult (H)    Current Medication: tirzepatide-Weight Management (ZEPBOUND) 5 MG/0.5ML prefilled pen   Rationale: Dose too low - Dosage too low - Effectiveness   Recommendation: Increase Dose - Zepbound 7.5 MG/0.5ML Soaj   Status: Accepted per CPA         Iron deficiency anemia    Current Medication: ferrous sulfate (FEROSUL) 325 (65 Fe) MG tablet   Rationale: Patient forgets to take - Adherence - Adherence   Recommendation: Provide Education   Status: Patient Agreed - Adherence/Education                Please do not hesitate to contact me if you have any questions/concerns.     Sincerely,     AMADEO CORTÉS RPH

## 2024-08-09 NOTE — PATIENT INSTRUCTIONS
"Recommendations from today's MTM visit:                                                      Increase zepbound to 7.5 mg once weekly   If you are tolerating this well and would like to increase the dose again after 1-2 months, let me know.  Start riding your bike 15 minutes two times per week   Re-start taking ferrous sulfate once daily or every other day for low blood counts/anemia    Follow-up: 3 months     It was great speaking with you today.  I value your experience and would be very thankful for your time in providing feedback in our clinic survey. In the next few days, you may receive an email or text message from [employer] with a link to a survey related to your  clinical pharmacist.\"     To schedule another MTM appointment, please call the clinic directly or you may call the MTM scheduling line at [phone].    My Clinical Pharmacist's contact information:                                                      Please feel free to contact me with any questions or concerns you have.      Floresita Salinas, PharmD, AAHIVP  Medication Therapy Management Pharmacist      "

## 2024-08-09 NOTE — PROGRESS NOTES
Virtual Visit Details    Type of service:  Telephone Visit   Phone call duration: 23 minutes   Originating Location (pt. Location): Home    Distant Location (provider location):  Off-site

## 2024-08-09 NOTE — NURSING NOTE
Is the patient currently in the state of [place]? YES    Visit mode:TELEPHONE    If the visit is dropped, the patient can be reconnected by: TELEPHONE VISIT: Phone number:   Telephone Information:   Mobile [phone]       Will anyone else be joining the visit? NO  (If patient encounters technical issues they should call [phone]    How would you like to obtain your AVS? MyChart    Are changes needed to the allergy or medication list? No    Are refills needed on medications prescribed by this physician? NO    Reason for visit: Medication Therapy Management    Zhanna RUSSO

## 2024-08-12 ENCOUNTER — APPOINTMENT (OUTPATIENT)
Dept: BEHAVIORAL HEALTH | Age: 34
End: 2024-08-12

## 2024-08-12 NOTE — PROGRESS NOTES
"Video-Visit Details    Type of service:  Video Visit    Video Start Time: 12:22 PM  Video End Time: 12:43 PM - last 17 min converted to phone d/t poor video connection.     Originating Location (pt. Location): Home    Distant Location (provider location):  Offsite (providers home) [facility] [place]     Platform used for Video Visit: Yapp      Weight Management Nutrition Consultation    Ben Upton is a 33 year old female presents today for return weight management nutrition consultation.  Patient referred by Juliet Emanuel PA-C on 2024.    Patient with Co-morbidities of obesity includin/24/2024     6:38 PM   --   I have the following health issues associated with obesity Fatty Liver    Asthma   I have the following symptoms associated with obesity Depression    Irregular Menstral Cycle         Anthropometrics:  Started Wegovy a few months ago at wt of 310 lbs.     Wt Readings from Last 5 Encounters:   24 131.5 kg (290 lb)   24 131.5 kg (290 lb)   24 133.4 kg (294 lb)   24 137.2 kg (302 lb 8 oz)   18 117 kg (258 lb)     Estimated body mass index is 52.2 kg/m  as calculated from the following:    Height as of 24: 1.588 m (5' 2.5\").    Weight as of 24: 131.5 kg (290 lb).      Medications for Weight Loss:  Zepbound    NUTRITION HISTORY  Food allergies: None  Food intolerances: Dairy  Vitamin/Mineral Supplements: None  More recently has been focusing on 1500 doris/day diet, healthier food choices, bought a bike.   Waking around 7:45 am   Strong cravings for ice cream   Lives with Mom, who cooks a couple meals per week, otherwise she is cooking her own.    Preferences: enjoys variety of foods     Pt reports today, she is planning to increase to 7.5 mg Zepbound. Working on not dining out, planning/prepping more at home. Stocked up on healthy snacks options - turkey sticks, pistachios, fast-breaks; Quest chips;   Less " sweets. Went to the [employer], anticipated she was going to get candy there but ended up consciously deciding not to purchase.    Recent Diet Recall:  Breakfast: Atkins meal bar;   AM snack: protein shake  Lunch: light-tuna pkt; Bird eye quinoa bowl   PM: working on incorporating protein shake   Dinner: chicken thighs, baby red potato and salad   Beverages: Crystal light/water - half gallon yeti daily; diet soda a few times monthly.   Alcohol: None    Progress Towards Previous Goals:  1) Limit ice cream to once weekly - Improving, keeping to once weekly pint.    - Could do sugar-free pudding instead   2) Can try another protein shake in afternoon to help with dinner portions. - Met, continues to find this helpful for smaller dinner portions and/or replaces dinner.   3) Increase biking. Set a goal for number of days per week to bike. Record progress on calendar or journal. 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       Physical Activity:  Walking around the mall lately. 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      Nutrition Prescription  Recommended energy/nutrient modification.    Nutrition Diagnosis  Obesity r/t long history of positive energy balance aeb BMI >30. - Ongoing    Nutrition Intervention  Materials/education provided on hypocaloric diet for weight loss. Discussed following calorie goal or 8283-3137 doris/day for an estimated 1-2 lb wt loss per week.   Reviewed progress towards previous goals. Praised pt on the positive change she has made.   Reviewed strategies for increasing physical activity.   Patient demonstrates understanding.  Co-developed goals to work towards.   Provided pt with list of goals and resources below via Rome2riot.     Expected Engagement: good  Follow-Up Plans: meal/snack planning     Nutrition Goals  1) 0856-6470 calories/day   2) Can try another protein shake in afternoon to  "help with dinner portions or use as meal replacement   3) Increase biking. Set a goal for number of days per week to bike. Record progress on calendar or journal.       Quick/Easy Protein Sources:  Hard boiled eggs  Part-skim cheese sticks  Baby Bell cheese rounds  Low-fat/low-sugar Greek yogurt  Low-fat cottage cheese  Lean deli meat (chicken/turkey/ham)  Roasted chickpeas or lentils  Nuts   Turkey meat stick  Protein shake/bar  \"P3\" snack (cheese, nuts, deli meat)  Aldi's \"Protein Bread\"   \"Egglife\" egg white wrap    Tuna/salmon can/packet     The Plate Method  [email or web address]    Protein Sources   [email or web address]://[email or web address]     Carbohydrates  [email or web address]://[email or web address]     Mindful Eating  [email or web address]://Neomend/[email or web address]     Summary of Volumetrics Eating Plan  [email or web address]://[email or web address]     Healthy Recipe Resources:  Books:  \"The Volumetrics Eating Plan\" by Brenda Foote, Ph.D.  \"Cooking that Counts\" by editors of Oasmia Pharmaceutical  \"Calorie Smart Meals\" cookbook by Better Homes and Remediation of [place] (200-500 calorie meals)    Websites:  www.Frontback  https://www.Avvasi Inc./  www.Reach Surgical.AccessSportsMedia.com  https://www.diabetesfoodhub.org/all-recipes.html  Https://www.choosemyplate.gov/myplatekitchen  Recipes by Season: https://snaped.fns.usda.gov/recipes-menus   Video Meal Prep: Https://www.youtube.com/c/jamie   Meal Plans: EatthiClickSquareduch.com   DASH Diet: https://www.nhlbi.nih.gov/education/dash-eating-plan  Whole Grains [place]: https://wholegrainscouncil.org/recipes      Cultural Cuisines:  Various Regions of African Cuisine: https://www.Socogame.com/recipes/67884/cuisines-regions//   Cuisine: https://www.firstnations.org/knowledge-center/recipes/  Mexican and Vegan Cuisine:   https://Cloverhill Enterprises/  https://DoodleDeals Inc./recipe-index/  Chinese Cuisine: https://www.Cyclone Power Technologies/  South Asian Cuisine:  Jaycee Figueroa on social media- South  high-protein/low-calorie " meal/food ideas  Mariel Henderson RD, [facility], plant-based South  Resources: https://www.PhaseBio Pharmaceuticals/    Apps:  Integrated Diagnostics veronika (or website, Endosee.com)   SpendSmartEatSmart           Follow-Up:  1 month, PRN    Time spent with patient: 21 minutes.  Chantell Covington RD, LD

## 2024-08-13 ENCOUNTER — VIRTUAL VISIT (OUTPATIENT)
Dept: ENDOCRINOLOGY | Facility: CLINIC | Age: 34
End: 2024-08-13
Payer: COMMERCIAL

## 2024-08-13 DIAGNOSIS — E66.813 CLASS 3 SEVERE OBESITY WITH SERIOUS COMORBIDITY AND BODY MASS INDEX (BMI) OF 50.0 TO 59.9 IN ADULT, UNSPECIFIED OBESITY TYPE (H): Primary | ICD-10-CM

## 2024-08-13 DIAGNOSIS — E66.01 CLASS 3 SEVERE OBESITY WITH SERIOUS COMORBIDITY AND BODY MASS INDEX (BMI) OF 50.0 TO 59.9 IN ADULT, UNSPECIFIED OBESITY TYPE (H): Primary | ICD-10-CM

## 2024-08-13 DIAGNOSIS — Z71.3 NUTRITIONAL COUNSELING: ICD-10-CM

## 2024-08-13 PROCEDURE — 99207 PR NO CHARGE LOS: CPT | Mod: 95 | Performed by: DIETITIAN, REGISTERED

## 2024-08-13 PROCEDURE — 97803 MED NUTRITION INDIV SUBSEQ: CPT | Mod: 95 | Performed by: DIETITIAN, REGISTERED

## 2024-08-13 NOTE — LETTER
"2024       RE: Ben Upton  [address] [address]     Dear Colleague,    Thank you for referring your patient, Ben Upton, to the [facility] [place] at [facility]. Please see a copy of my visit note below.    Video-Visit Details    Type of service:  Video Visit    Video Start Time: 12:22 PM  Video End Time: 12:43 PM - last 17 min converted to phone d/t poor video connection.     Originating Location (pt. Location): Home    Distant Location (provider location):  Offsite (providers home) [facility] [place]     Platform used for Video Visit: Arboribus      Weight Management Nutrition Consultation    Ben Upton is a 33 year old female presents today for return weight management nutrition consultation.  Patient referred by Juliet Emanuel PA-C on 2024.    Patient with Co-morbidities of obesity includin/24/2024     6:38 PM   --   I have the following health issues associated with obesity Fatty Liver    Asthma   I have the following symptoms associated with obesity Depression    Irregular Menstral Cycle         Anthropometrics:  Started Wegovy a few months ago at wt of 310 lbs.     Wt Readings from Last 5 Encounters:   24 131.5 kg (290 lb)   24 131.5 kg (290 lb)   24 133.4 kg (294 lb)   24 137.2 kg (302 lb 8 oz)   18 117 kg (258 lb)     Estimated body mass index is 52.2 kg/m  as calculated from the following:    Height as of 24: 1.588 m (5' 2.5\").    Weight as of 24: 131.5 kg (290 lb).      Medications for Weight Loss:  Zepbound    NUTRITION HISTORY  Food allergies: None  Food intolerances: Dairy  Vitamin/Mineral Supplements: None  More recently has been focusing on 1500 doris/day diet, healthier food choices, bought a bike.   Waking around 7:45 am   Strong cravings for ice cream   Lives with Mom, who cooks a " couple meals per week, otherwise she is cooking her own.    Preferences: enjoys variety of foods     Pt reports today, she is planning to increase to 7.5 mg Zepbound. Working on not dining out, planning/prepping more at home. Stocked up on healthy snacks options - turkey sticks, pistachios, fast-breaks; Quest chips;   Less sweets. Went to the [employer], anticipated she was going to get candy there but ended up consciously deciding not to purchase.    Recent Diet Recall:  Breakfast: Atkins meal bar;   AM snack: protein shake  Lunch: light-tuna pkt; Bird eye quinoa bowl   PM: working on incorporating protein shake   Dinner: chicken thighs, baby red potato and salad   Beverages: Crystal light/water - half gallon yeti daily; diet soda a few times monthly.   Alcohol: None    Progress Towards Previous Goals:  1) Limit ice cream to once weekly - Improving, keeping to once weekly pint.    - Could do sugar-free pudding instead   2) Can try another protein shake in afternoon to help with dinner portions. - Met, continues to find this helpful for smaller dinner portions and/or replaces dinner.   3) Increase biking. Set a goal for number of days per week to bike. Record progress on calendar or journal. 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       Physical Activity:  Walking around the mall lately. 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      Nutrition Prescription  Recommended energy/nutrient modification.    Nutrition Diagnosis  Obesity r/t long history of positive energy balance aeb BMI >30. - Ongoing    Nutrition Intervention  Materials/education provided on hypocaloric diet for weight loss. Discussed following calorie goal or 5298-3695 doris/day for an estimated 1-2 lb wt loss per week.   Reviewed progress towards previous goals. Praised pt on the positive change she has made.   Reviewed strategies for increasing  "physical activity.   Patient demonstrates understanding.  Co-developed goals to work towards.   Provided pt with list of goals and resources below via RivalSoftt.     Expected Engagement: good  Follow-Up Plans: meal/snack planning     Nutrition Goals  1) 2893-2875 calories/day   2) Can try another protein shake in afternoon to help with dinner portions or use as meal replacement   3) Increase biking. Set a goal for number of days per week to bike. Record progress on calendar or journal.       Quick/Easy Protein Sources:  Hard boiled eggs  Part-skim cheese sticks  Baby Bell cheese rounds  Low-fat/low-sugar Greek yogurt  Low-fat cottage cheese  Lean deli meat (chicken/turkey/ham)  Roasted chickpeas or lentils  Nuts   Turkey meat stick  Protein shake/bar  \"P3\" snack (cheese, nuts, deli meat)  Aldi's \"Protein Bread\"   \"Egglife\" egg white wrap    Tuna/salmon can/packet     The Plate Method  [email or web address]://[email or web address]    Protein Sources   http://Just Above Cost/626551.pdf     Carbohydrates  http://fvfiles.com/395656.pdf     Mindful Eating  http://Just Above Cost/282996.pdf     Summary of Volumetrics Eating Plan  http://[email or web address]/[email or web address]     Healthy Recipe Resources:  Books:  \"The Volumetrics Eating Plan\" by Brenda Foote, Ph.D.  \"Cooking that Counts\" by editors of Pollenizer  \"Calorie Smart Meals\" cookbook by Better Homes and Gardens (200-500 calorie meals)    Websites:  www.Intrakr  https://www.Vpon/  www.Anobit Technologies.Karoon Gas [place]  https://www.diabetesfoodhub.org/all-recipes.html  Https://www.choosemyplate.gov/myplatekitchen  Recipes by Season: https://snaped.fns.usda.gov/recipes-menus   Video Meal Prep: Https://www.Stealth Therapeutics.com/c/jamie   Meal Plans: EatthiKeenSkim.com   DASH Diet: https://www.nhlbi.nih.gov/education/dash-eating-plan  Whole Grains [place]: https://wholegrainscouncil.org/recipes      Cultural Cuisines:  Various Regions of  Cuisine: " https://www.SYLLETA.SportStream/recipes/46993/cuisines-regions//   Cuisine: https://www.Inson Medical Systems.org/knowledge-center/recipes/  Mexican and Vegan Cuisine:   https://Grand Prix Holdings USA/  https://Beiang Technology/recipe-index/  Chinese Cuisine: https://www.yaM Labs/  South Asian Cuisine:  Jaycee Figueroa on social media- South  high-protein/low-calorie meal/food ideas  Mariel Henderson RD, [facility], plant-based South  Resources: https://www.OMG/    Apps:  Mozzo Analytics veronika (or website, mealime.com)   SpendSmartEatSmart           Follow-Up:  1 month, PRN    Time spent with patient: 21 minutes.  Chantell Covington RD, LD        Again, thank you for allowing me to participate in the care of your patient.      Sincerely,    Chantell Covington RD

## 2024-08-13 NOTE — PATIENT INSTRUCTIONS
"Hi DERICK,    Follow-up with RD on 9/30    Thank you,    Chantell Covington, RD, LD  If you would like to schedule or reschedule an appointment with the RD, please call [phone]    Nutrition Goals  1) 2966-4983 calories/day   2) Can try another protein shake in afternoon to help with dinner portions or use as meal replacement   3) Increase biking. Set a goal for number of days per week to bike. Record progress on calendar or journal.       Quick/Easy Protein Sources:  Hard boiled eggs  Part-skim cheese sticks  Baby Bell cheese rounds  Low-fat/low-sugar Greek yogurt  Low-fat cottage cheese  Lean deli meat (chicken/turkey/ham)  Roasted chickpeas or lentils  Nuts   Turkey meat stick  Protein shake/bar  \"P3\" snack (cheese, nuts, deli meat)  Aldi's \"Protein Bread\"   \"Egglife\" egg white wrap    Tuna/salmon can/packet     The Plate Method  [email or web address]://[email or web address].[email or web address].pdf    Protein Sources   http://Zevia/115408.pdf     Carbohydrates  http://fvfiles.com/036127.pdf     Mindful Eating  http://Zevia/597044.pdf     Summary of Volumetrics Eating Plan  http://fvfiles.com/662601.pdf     Healthy Recipe Resources:  Books:  \"The Volumetrics Eating Plan\" by Brenda Foote, Ph.D.  \"Cooking that Counts\" by editors of Deeplink  \"Calorie Smart Meals\" cookbook by Better Homes and Gardens (200-500 calorie meals)    Websites:  www.Happyshop  https://www.My Artful Jewels/  www.Mpex Pharmaceuticals.org  https://www.diabetesfoodhub.org/all-recipes.html  Https://www.choosemyplate.gov/myplatekitchen  Recipes by Season: https://snaped.fns.usda.gov/recipes-menus   Video Meal Prep: Https://www.youtube.com/c/jamie   Meal Plans: EatArvirago.com   DASH Diet: https://www.nhlbi.nih.gov/education/dash-eating-plan  Whole Grains [place]: https://wholegrainscouncil.org/recipes      Cultural Cuisines:  Various Regions of African Cuisine: https://www.1.618 Technology.Equifax/recipes/07457/cuisines-regions//   Cuisine: " https://www.Imagry.org/knowledge-center/recipes/  Mexican and Vegan Cuisine:   https://Glimmerglass Networks/  https://E-nterview/recipe-index/  Chinese Cuisine: https://www.Galleon.UseTogether/  South Asian Cuisine:  Jaycee Figueroa on social media- South  high-protein/low-calorie meal/food ideas  Mariel Henderson RD, [facility], plant-based South  Resources: https://www.Kymeta/    Apps:  Fitfu veronika (or website, mealime.com)   [facility]       COMPREHENSIVE WEIGHT MANAGEMENT PROGRAM  VIRTUAL SUPPORT GROUPS    At [facility], our Comprehensive Weight Management program offers on-line support groups for patients who are working on weight loss and considering, preparing for, or have had weight loss surgery.     There is no cost for this opportunity.  You are invited to attend the?Virtual Support Groups?provided by any of the following locations:    [facility] via Microsoft Teams with Maria D Grubbs RN  2.   [place] via tok tok tok with Mohsen Perdue, PhD, LP  3.   [place] via tok tok tok with Bisi Page RN  4.   [facility] via a Zoom Meeting with CHRISTIAN Garcia    The following Support Group information can also be found on our website:  https://www.[facility].org/treatments/weight-loss-and-weight-loss-surgery-support-groups      [facility] Weight Loss Surgery Support Group  The support group is a patient-lead forum that meets monthly to share experiences, encouragement and education. It is open to those who have had weight loss surgery, are scheduled for surgery, or are considering surgery.   WHEN: This group meets on the 3rd Wednesday of each month from 5:00PM - 6:00PM virtually using Microsoft Teams.   FACILITATOR: Led by Maria D Gonzalez RD, LD, RN, the program's Clinical Coordinator.   TO REGISTER: Please contact the clinic via Next audience or call the nurse line directly at [phone] to inform our staff that you would like an invite  "sent to you and to let us know the email you would like the invite sent to. Prior to the meeting, a link with directions on how to join the meeting will be sent to you.    2023 and 2024 Meetings   December 20  January 17  February 21  March 20  April 17  May 15  Marley 19      [facility] Bariatric Care Support Group?  This is open to all pre- and post- operative bariatric surgery patients as well as their support system.   WHEN: This group meets the 3rd Tuesday of each month from 6:30 PM - 8:00 PM virtually using Microsoft Teams.   FACILITATOR: Led by Mohsen Perdue, Ph.D who is a Licensed Psychologist with the [facility] Comprehensive Weight Management Program.   TO REGISTER: Please send an email to Mohsen Perdue, Ph.D., LP at?[email or web address]?if you would like an invitation to the group. Prior to the meeting, a link with directions on how to join the meeting will be sent to you.    2023 and 2024 Meetings  December 19 January 16: \"Medication Management and Bariatric Surgery\", Hali Taylor, PharmD, Pharmacy Resident at [facility]  February 20: \"A Bariatric Surgery Patient's Perspective\", MAXIMINO Pagan, [facility], Behavioral Health Clinician at [facility]  March 19  April 16  May 21  Marley 18: \"Nutritional Labeling\", Dietitian speaker to be announced.  November 19: \"Holiday Eating\", Dietitian speaker to be announced.    [facility] Post-Operative Bariatric Surgery Support Group  This is a support group for [facility] bariatric patients (and those external to [facility]) who have had bariatric surgery and are at least 3 months post-surgery.  WHEN: This support group meets the 4th Wednesday of the month from 11:00 AM - 12:00 PM virtually using Microsoft Teams.   FACILITATOR: Led by Certified Bariatric Nurse, Bisi Page RN.   TO " "REGISTER: Please send an email to Bisi at [email or web address] if you would like an invitation to the group.  Prior to the meeting, a link with directions on how to join the meeting will be sent to you.    2023 and 2024 Meetings  December 27  January 24  February 28  March 27  April 24  May 22  Marley 26    [facility] Healthy Lifestyle Group?  This is a 60 minute virtual coaching group for those who want to lead a healthier lifestyle. Come together to set goals and overcome barriers in a supportive group environment. We will address the four pillars of health: nutrition, exercise, sleep and emotional well-being.  This group is highly recommended for those who are participating in the 24 week Healthy Lifestyle Plan and our Health Coaching sessions.  WHEN: This group meets the 1st Friday of the month, 12:30 PM - 1:30 PM online, via a zoom meeting.    FACILITATOR: Led by National Board Certified Health and , Bisi Pelaez, [facility].   TO REGISTER: Please call the Call Center at [phone] to register.  You will get an appointment to attend in [place]. Fifteen minutes prior to the meeting, complete the e-check in and you will get the link to join the meeting.    There is no charge to attend this group and space is limited.     2023 and 2024 Meetings  December 1: \"Let's Talk\" (guided discussion on our wins and challenges)  January 5: \"New Years Vision: Manifest your Best 2024!\" (guided imagery,  journaling and discussion)  February 2: \"Let's Talk\"  March 1: \"10 Percent Happier\" by Martell Willard (Book Bites - a guided discussion on the nuggets of wisdom from favorite wellness books, no need to read the book but highly encouraged)  April 5: \"Let's Talk\"  May 3: \"Essentialism: The Disciplined Pursuit of Less\" by Jd Farias (Book Bites discussion)  June 7: \"Let's Talk\"  July 5: NO MEETING, off for the 4th of July Holiday  August 2: \"The Blue Zones, Secrets for " "Living a Longer Life\" by Martell Thompson (Book Bites discussion)                    "

## 2024-08-13 NOTE — NURSING NOTE
Is the patient currently in the state of [place]? YES    Visit mode:VIDEO    If the visit is dropped, the patient can be reconnected by: VIDEO VISIT: Text to cell phone:   Telephone Information:   Mobile [phone]    and VIDEO VISIT: Send to e-mail at: [email or web address]    Will anyone else be joining the visit? NO  (If patient encounters technical issues they should call [phone]    How would you like to obtain your AVS? MyChart    Are changes needed to the allergy or medication list? No    Are refills needed on medications prescribed by this physician? NO    Reason for visit: CHERYL RUSSO

## 2024-08-19 ENCOUNTER — APPOINTMENT (OUTPATIENT)
Dept: BEHAVIORAL HEALTH | Age: 34
End: 2024-08-19

## 2024-08-19 DIAGNOSIS — F90.0 ATTENTION DEFICIT HYPERACTIVITY DISORDER (ADHD), PREDOMINANTLY INATTENTIVE TYPE: ICD-10-CM

## 2024-08-19 DIAGNOSIS — G24.01 TARDIVE DYSKINESIA: ICD-10-CM

## 2024-08-19 DIAGNOSIS — G47.09 INITIAL INSOMNIA: ICD-10-CM

## 2024-08-19 DIAGNOSIS — F42.2 MIXED OBSESSIONAL THOUGHTS AND ACTS: ICD-10-CM

## 2024-08-19 DIAGNOSIS — F31.9 BIPOLAR 1 DISORDER  (CMD): Primary | ICD-10-CM

## 2024-08-19 DIAGNOSIS — Z79.899 MEDICATION MANAGEMENT: ICD-10-CM

## 2024-08-19 DIAGNOSIS — F41.1 GAD (GENERALIZED ANXIETY DISORDER): ICD-10-CM

## 2024-08-19 PROCEDURE — 99215 OFFICE O/P EST HI 40 MIN: CPT

## 2024-08-26 ENCOUNTER — APPOINTMENT (OUTPATIENT)
Dept: BEHAVIORAL HEALTH | Age: 34
End: 2024-08-26

## 2024-08-26 DIAGNOSIS — F31.9 BIPOLAR 1 DISORDER  (CMD): Primary | ICD-10-CM

## 2024-08-26 DIAGNOSIS — F42.2 MIXED OBSESSIONAL THOUGHTS AND ACTS: ICD-10-CM

## 2024-08-26 DIAGNOSIS — F90.0 ATTENTION DEFICIT HYPERACTIVITY DISORDER (ADHD), PREDOMINANTLY INATTENTIVE TYPE: ICD-10-CM

## 2024-08-26 DIAGNOSIS — F41.1 GAD (GENERALIZED ANXIETY DISORDER): ICD-10-CM

## 2024-09-05 ENCOUNTER — TELEPHONE (OUTPATIENT)
Dept: OBGYN | Age: 34
End: 2024-09-05

## 2024-09-09 ENCOUNTER — APPOINTMENT (OUTPATIENT)
Dept: BEHAVIORAL HEALTH | Age: 34
End: 2024-09-09

## 2024-09-09 ENCOUNTER — APPOINTMENT (OUTPATIENT)
Dept: LAB | Age: 34
End: 2024-09-09

## 2024-09-09 DIAGNOSIS — Z79.899 MEDICATION MANAGEMENT: ICD-10-CM

## 2024-09-09 DIAGNOSIS — F31.9 BIPOLAR 1 DISORDER  (CMD): ICD-10-CM

## 2024-09-09 DIAGNOSIS — F41.1 GAD (GENERALIZED ANXIETY DISORDER): ICD-10-CM

## 2024-09-09 DIAGNOSIS — E55.9 VITAMIN D INSUFFICIENCY: Primary | ICD-10-CM

## 2024-09-09 DIAGNOSIS — F31.9 BIPOLAR 1 DISORDER  (CMD): Primary | ICD-10-CM

## 2024-09-09 DIAGNOSIS — F42.2 MIXED OBSESSIONAL THOUGHTS AND ACTS: ICD-10-CM

## 2024-09-09 DIAGNOSIS — F90.0 ATTENTION DEFICIT HYPERACTIVITY DISORDER (ADHD), PREDOMINANTLY INATTENTIVE TYPE: ICD-10-CM

## 2024-09-09 LAB
BASOPHILS # BLD: 0.2 K/MCL (ref 0–0.3)
BASOPHILS NFR BLD: 2 %
DEPRECATED RDW RBC: 45.2 FL (ref 39–50)
EOSINOPHIL # BLD: 0.2 K/MCL (ref 0–0.5)
EOSINOPHIL NFR BLD: 2 %
ERYTHROCYTE [DISTWIDTH] IN BLOOD: 18 % (ref 11–15)
HCT VFR BLD CALC: 29 % (ref 36–46.5)
HGB BLD-MCNC: 8.6 G/DL (ref 12–15.5)
LYMPHOCYTES # BLD: 1.9 K/MCL (ref 1–4.8)
LYMPHOCYTES NFR BLD: 20 %
MCH RBC QN AUTO: 20.8 PG (ref 26–34)
MCHC RBC AUTO-ENTMCNC: 29.7 G/DL (ref 32–36.5)
MCV RBC AUTO: 70.2 FL (ref 78–100)
MICROCYTES BLD QL SMEAR: NORMAL
MONOCYTES # BLD: 0.5 K/MCL (ref 0.3–0.9)
MONOCYTES NFR BLD: 5 %
NEUTROPHILS # BLD: 6.7 K/MCL (ref 1.8–7.7)
NEUTS SEG NFR BLD: 71 %
NRBC BLD MANUAL-RTO: 0 /100 WBC
OVALOCYTES BLD QL SMEAR: NORMAL
PLAT MORPH BLD: NORMAL
PLATELET # BLD AUTO: 328 K/MCL (ref 140–450)
RBC # BLD: 4.13 MIL/MCL (ref 4–5.2)
WBC # BLD: 9.5 K/MCL (ref 4.2–11)
WBC MORPH BLD: NORMAL

## 2024-09-09 PROCEDURE — 90832 PSYTX W PT 30 MINUTES: CPT | Performed by: COUNSELOR

## 2024-09-09 PROCEDURE — 36415 COLL VENOUS BLD VENIPUNCTURE: CPT | Performed by: INTERNAL MEDICINE

## 2024-09-09 PROCEDURE — 82306 VITAMIN D 25 HYDROXY: CPT | Performed by: CLINICAL MEDICAL LABORATORY

## 2024-09-09 PROCEDURE — 85027 COMPLETE CBC AUTOMATED: CPT | Performed by: INTERNAL MEDICINE

## 2024-09-10 ENCOUNTER — NURSE TRIAGE (OUTPATIENT)
Dept: TELEHEALTH | Age: 34
End: 2024-09-10

## 2024-09-10 ENCOUNTER — HOSPITAL ENCOUNTER (EMERGENCY)
Age: 34
Discharge: HOME | End: 2024-09-10
Attending: EMERGENCY MEDICINE

## 2024-09-10 ENCOUNTER — TELEPHONE (OUTPATIENT)
Dept: BEHAVIORAL HEALTH | Age: 34
End: 2024-09-10

## 2024-09-10 VITALS
OXYGEN SATURATION: 97 % | HEIGHT: 62 IN | BODY MASS INDEX: 53.92 KG/M2 | TEMPERATURE: 97.9 F | HEART RATE: 97 BPM | WEIGHT: 293 LBS | RESPIRATION RATE: 17 BRPM | DIASTOLIC BLOOD PRESSURE: 78 MMHG | SYSTOLIC BLOOD PRESSURE: 137 MMHG

## 2024-09-10 DIAGNOSIS — D64.9 ANEMIA, UNSPECIFIED TYPE: ICD-10-CM

## 2024-09-10 DIAGNOSIS — R42 LIGHTHEADEDNESS: Primary | ICD-10-CM

## 2024-09-10 LAB
25(OH)D3+25(OH)D2 SERPL-MCNC: 20.5 NG/ML (ref 30–100)
ALBUMIN SERPL-MCNC: 3.2 G/DL (ref 3.6–5.1)
ALBUMIN/GLOB SERPL: 0.8 {RATIO} (ref 1–2.4)
ALP SERPL-CCNC: 173 UNITS/L (ref 45–117)
ALT SERPL-CCNC: 21 UNITS/L
ANION GAP SERPL CALC-SCNC: 13 MMOL/L (ref 7–19)
APPEARANCE UR: CLEAR
AST SERPL-CCNC: 17 UNITS/L
ATRIAL RATE (BPM): 112
BACTERIA #/AREA URNS HPF: ABNORMAL /HPF
BASOPHILS # BLD: 0.1 K/MCL (ref 0–0.3)
BASOPHILS NFR BLD: 1 %
BILIRUB SERPL-MCNC: 0.2 MG/DL (ref 0.2–1)
BILIRUB UR QL STRIP: NEGATIVE
BUN SERPL-MCNC: 12 MG/DL (ref 6–20)
BUN/CREAT SERPL: 15 (ref 7–25)
CALCIUM SERPL-MCNC: 8.7 MG/DL (ref 8.4–10.2)
CHLORIDE SERPL-SCNC: 107 MMOL/L (ref 97–110)
CO2 SERPL-SCNC: 23 MMOL/L (ref 21–32)
COLOR UR: COLORLESS
CREAT SERPL-MCNC: 0.82 MG/DL (ref 0.51–0.95)
DEPRECATED RDW RBC: 45.5 FL (ref 39–50)
EGFRCR SERPLBLD CKD-EPI 2021: >90 ML/MIN/{1.73_M2}
EOSINOPHIL # BLD: 0 K/MCL (ref 0–0.5)
EOSINOPHIL NFR BLD: 0 %
ERYTHROCYTE [DISTWIDTH] IN BLOOD: 18.2 % (ref 11–15)
FASTING DURATION TIME PATIENT: ABNORMAL H
GLOBULIN SER-MCNC: 4.1 G/DL (ref 2–4)
GLUCOSE SERPL-MCNC: 101 MG/DL (ref 70–99)
GLUCOSE UR STRIP-MCNC: NEGATIVE MG/DL
HCT VFR BLD CALC: 29.9 % (ref 36–46.5)
HGB BLD-MCNC: 8.8 G/DL (ref 12–15.5)
HGB UR QL STRIP: ABNORMAL
HYALINE CASTS #/AREA URNS LPF: ABNORMAL /LPF
IMM GRANULOCYTES # BLD AUTO: 0 K/MCL (ref 0–0.2)
IMM GRANULOCYTES # BLD: 0 %
KETONES UR STRIP-MCNC: NEGATIVE MG/DL
LEUKOCYTE ESTERASE UR QL STRIP: NEGATIVE
LYMPHOCYTES # BLD: 2.3 K/MCL (ref 1–4.8)
LYMPHOCYTES NFR BLD: 20 %
MCH RBC QN AUTO: 20.8 PG (ref 26–34)
MCHC RBC AUTO-ENTMCNC: 29.4 G/DL (ref 32–36.5)
MCV RBC AUTO: 70.5 FL (ref 78–100)
MONOCYTES # BLD: 0.7 K/MCL (ref 0.3–0.9)
MONOCYTES NFR BLD: 6 %
NEUTROPHILS # BLD: 8.5 K/MCL (ref 1.8–7.7)
NEUTROPHILS NFR BLD: 73 %
NITRITE UR QL STRIP: NEGATIVE
NRBC BLD MANUAL-RTO: 0 /100 WBC
P AXIS (DEGREES): 61
PH UR STRIP: 5.5 [PH] (ref 5–7)
PLATELET # BLD AUTO: 426 K/MCL (ref 140–450)
POTASSIUM SERPL-SCNC: 3.9 MMOL/L (ref 3.4–5.1)
PR-INTERVAL (MSEC): 114
PROT SERPL-MCNC: 7.3 G/DL (ref 6.4–8.2)
PROT UR STRIP-MCNC: NEGATIVE MG/DL
QRS-INTERVAL (MSEC): 80
QT-INTERVAL (MSEC): 392
QTC: 535
R AXIS (DEGREES): 19
RBC # BLD: 4.24 MIL/MCL (ref 4–5.2)
RBC #/AREA URNS HPF: ABNORMAL /HPF
REPORT TEXT: NORMAL
SODIUM SERPL-SCNC: 139 MMOL/L (ref 135–145)
SP GR UR STRIP: 1.01 (ref 1–1.03)
SQUAMOUS #/AREA URNS HPF: ABNORMAL /HPF
T AXIS (DEGREES): 45
UROBILINOGEN UR STRIP-MCNC: 0.2 MG/DL
VENTRICULAR RATE EKG/MIN (BPM): 112
WBC # BLD: 11.6 K/MCL (ref 4.2–11)
WBC #/AREA URNS HPF: ABNORMAL /HPF

## 2024-09-10 PROCEDURE — 36415 COLL VENOUS BLD VENIPUNCTURE: CPT

## 2024-09-10 PROCEDURE — 81001 URINALYSIS AUTO W/SCOPE: CPT | Performed by: EMERGENCY MEDICINE

## 2024-09-10 PROCEDURE — 80053 COMPREHEN METABOLIC PANEL: CPT | Performed by: EMERGENCY MEDICINE

## 2024-09-10 PROCEDURE — 96360 HYDRATION IV INFUSION INIT: CPT

## 2024-09-10 PROCEDURE — 99284 EMERGENCY DEPT VISIT MOD MDM: CPT

## 2024-09-10 PROCEDURE — 93005 ELECTROCARDIOGRAM TRACING: CPT | Performed by: EMERGENCY MEDICINE

## 2024-09-10 PROCEDURE — 10002807 HB RX 258: Performed by: EMERGENCY MEDICINE

## 2024-09-10 PROCEDURE — 85025 COMPLETE CBC W/AUTO DIFF WBC: CPT | Performed by: EMERGENCY MEDICINE

## 2024-09-10 PROCEDURE — 96361 HYDRATE IV INFUSION ADD-ON: CPT

## 2024-09-10 RX ORDER — ERGOCALCIFEROL 1.25 MG/1
50000 CAPSULE ORAL
Qty: 4 CAPSULE | Refills: 2 | Status: SHIPPED | OUTPATIENT
Start: 2024-09-10

## 2024-09-10 RX ADMIN — SODIUM CHLORIDE 1000 ML: 9 INJECTION, SOLUTION INTRAVENOUS at 16:38

## 2024-09-10 SDOH — SOCIAL STABILITY: SOCIAL INSECURITY: HOW OFTEN DOES ANYONE, INCLUDING FAMILY AND FRIENDS, PHYSICALLY HURT YOU?: NEVER

## 2024-09-10 SDOH — SOCIAL STABILITY: SOCIAL INSECURITY: HOW OFTEN DOES ANYONE, INCLUDING FAMILY AND FRIENDS, SCREAM OR CURSE AT YOU?: NEVER

## 2024-09-10 SDOH — SOCIAL STABILITY: SOCIAL INSECURITY: HOW OFTEN DOES ANYONE, INCLUDING FAMILY AND FRIENDS, THREATEN YOU WITH HARM?: NEVER

## 2024-09-10 SDOH — SOCIAL STABILITY: SOCIAL INSECURITY: HOW OFTEN DOES ANYONE, INCLUDING FAMILY AND FRIENDS, INSULT OR TALK DOWN TO YOU?: NEVER

## 2024-09-10 ASSESSMENT — PAIN SCALES - GENERAL: PAINLEVEL_OUTOF10: 2

## 2024-09-10 ASSESSMENT — ENCOUNTER SYMPTOMS
FEVER: 0
CHILLS: 0
COUGH: 0
FATIGUE: 0
BRUISES/BLEEDS EASILY: 0
DIZZINESS: 0
SORE THROAT: 0
CONFUSION: 0
BACK PAIN: 0
COLOR CHANGE: 0
RHINORRHEA: 0
VOMITING: 0
HEADACHES: 1
TROUBLE SWALLOWING: 0
NAUSEA: 0
WEAKNESS: 0
CONSTIPATION: 0
ABDOMINAL PAIN: 0
SHORTNESS OF BREATH: 0
LIGHT-HEADEDNESS: 1
DIARRHEA: 0
ADENOPATHY: 0

## 2024-09-10 ASSESSMENT — PAIN DESCRIPTION - PAIN TYPE: TYPE: ACUTE PAIN

## 2024-09-16 ENCOUNTER — APPOINTMENT (OUTPATIENT)
Dept: BEHAVIORAL HEALTH | Age: 34
End: 2024-09-16

## 2024-09-23 ENCOUNTER — APPOINTMENT (OUTPATIENT)
Dept: OBGYN | Age: 34
End: 2024-09-23

## 2024-09-23 ENCOUNTER — APPOINTMENT (OUTPATIENT)
Dept: BEHAVIORAL HEALTH | Age: 34
End: 2024-09-23

## 2024-09-23 VITALS
BODY MASS INDEX: 53.92 KG/M2 | WEIGHT: 293 LBS | DIASTOLIC BLOOD PRESSURE: 74 MMHG | SYSTOLIC BLOOD PRESSURE: 134 MMHG | HEIGHT: 62 IN

## 2024-09-23 DIAGNOSIS — G24.01 TARDIVE DYSKINESIA: ICD-10-CM

## 2024-09-23 DIAGNOSIS — Z79.899 MEDICATION MANAGEMENT: ICD-10-CM

## 2024-09-23 DIAGNOSIS — G47.09 INITIAL INSOMNIA: ICD-10-CM

## 2024-09-23 DIAGNOSIS — F90.0 ATTENTION DEFICIT HYPERACTIVITY DISORDER (ADHD), PREDOMINANTLY INATTENTIVE TYPE: ICD-10-CM

## 2024-09-23 DIAGNOSIS — N92.0 MENORRHAGIA WITH REGULAR CYCLE: ICD-10-CM

## 2024-09-23 DIAGNOSIS — F42.2 MIXED OBSESSIONAL THOUGHTS AND ACTS: ICD-10-CM

## 2024-09-23 DIAGNOSIS — Z30.46 ENCOUNTER FOR REMOVAL AND REINSERTION OF NEXPLANON: Primary | ICD-10-CM

## 2024-09-23 DIAGNOSIS — F41.1 GAD (GENERALIZED ANXIETY DISORDER): ICD-10-CM

## 2024-09-23 DIAGNOSIS — F31.9 BIPOLAR 1 DISORDER  (CMD): Primary | ICD-10-CM

## 2024-09-23 PROCEDURE — 99215 OFFICE O/P EST HI 40 MIN: CPT

## 2024-09-23 PROCEDURE — 11983 REMOVE/INSERT DRUG IMPLANT: CPT | Performed by: PHYSICIAN ASSISTANT

## 2024-09-23 PROCEDURE — 58100 BIOPSY OF UTERUS LINING: CPT | Performed by: PHYSICIAN ASSISTANT

## 2024-09-23 PROCEDURE — 88305 TISSUE EXAM BY PATHOLOGIST: CPT | Performed by: CLINICAL MEDICAL LABORATORY

## 2024-09-25 ENCOUNTER — TELEPHONE (OUTPATIENT)
Dept: OBGYN | Age: 34
End: 2024-09-25

## 2024-09-25 LAB
ASR DISCLAIMER: NORMAL
CASE RPRT: NORMAL
CLINICAL INFO: NORMAL
PATH REPORT.FINAL DX SPEC: NORMAL
PATH REPORT.GROSS SPEC: NORMAL
PATH REPORT.MICROSCOPIC SPEC OTHER STN: NORMAL

## 2024-09-26 ENCOUNTER — APPOINTMENT (OUTPATIENT)
Dept: INTERNAL MEDICINE | Age: 34
End: 2024-09-26

## 2024-09-26 VITALS
HEART RATE: 105 BPM | BODY MASS INDEX: 53.92 KG/M2 | OXYGEN SATURATION: 98 % | WEIGHT: 293 LBS | SYSTOLIC BLOOD PRESSURE: 136 MMHG | DIASTOLIC BLOOD PRESSURE: 82 MMHG | HEIGHT: 62 IN

## 2024-09-26 DIAGNOSIS — E61.1 LOW IRON: ICD-10-CM

## 2024-09-26 DIAGNOSIS — Z23 INFLUENZA VACCINE NEEDED: Primary | ICD-10-CM

## 2024-09-27 PROCEDURE — 90471 IMMUNIZATION ADMIN: CPT | Performed by: INTERNAL MEDICINE

## 2024-09-27 PROCEDURE — 90656 IIV3 VACC NO PRSV 0.5 ML IM: CPT | Performed by: INTERNAL MEDICINE

## 2024-09-27 NOTE — PROGRESS NOTES
"Video-Visit Details    Type of service:  Video Visit    Video Start Time: 10:22 AM   Video End Time: 10:45 AM    Originating Location (pt. Location): Home    Distant Location (provider location):  Offsite (providers home) [facility]NA[facility] [place]     Platform used for Video Visit: CareCent[employer]      Weight Management Nutrition Consultation    Ben Upton is a 33 year old female presents today for return weight management nutrition consultation.  Patient referred by Juliet Emanuel PA-C on 2024.    Patient with Co-morbidities of obesity includin/24/2024     6:38 PM   --   I have the following health issues associated with obesity Fatty Liver    Asthma   I have the following symptoms associated with obesity Depression    Irregular Menstral Cycle         Anthropometrics:  Started Wegovy a few months ago at wt of 310 lbs.     Wt Readings from Last 5 Encounters:   24 131.5 kg (290 lb)   24 131.5 kg (290 lb)   24 133.4 kg (294 lb)   24 137.2 kg (302 lb 8 oz)   18 117 kg (258 lb)     Estimated body mass index is 51.62 kg/m  as calculated from the following:    Height as of this encounter: 1.588 m (5' 2.52\").    Weight as of this encounter: 130.2 kg (287 lb). (-3 lbs from last month, -23 lbs from start of medication)      Medications for Weight Loss:  Zepbound    NUTRITION HISTORY  Food allergies: None  Food intolerances: Dairy  Vitamin/Mineral Supplements: Iron sulfate. States sjamber has rx for weekly vitamin D she has yet to start.    More recently has been focusing on 1500 doris/day diet, healthier food choices, bought a bike.   Waking around 7:45 am   Strong cravings for ice cream   Lives with Mom, who cooks a couple meals per week, otherwise she is cooking her own.    Preferences: enjoys variety of foods     24 - she is planning to increase to 7.5 mg Zepbound. Working on not dining out, planning/prepping more at home. Stocked up on healthy " snacks options - turkey sticks, pistachios, fast-breaks; Quest chips;   Less sweets. Went to the [employer], anticipated she was going to get candy there but ended up consciously deciding not to purchase.    Today - Stop nutritional tracking for a bit but restarted after weight loss slowed. Tracking in [facility], keeping intake to ~1500 doris/d. [place] like she got of track during week of her bday. Doing really well with protein intake. Utilizing protein shake and bar for breakfast replacement. May utilize another protein shake as snack. Doing well with lean protein and veggies at other meals.       Recent Diet Recall:  Breakfast: Protein bar and shake (Premier Protein)  Lunch: adult lunchable and veggie tray on days she works (14 gm protein)    PM snack: Protein shake; part-skim cheese sticks; turkey sticks; yasso bar   Dinner: popcorn chicken (300cal); taco salad with quest tortilla chip  Beverages: Crystal light/water - half gallon yeti daily; sugar-free body armor and lemonade once daily. diet soda once weekly.   Alcohol: None    Progress Towards Previous Goals:  1) 0823-5132 calories/day - Met, continues   2) Can try another protein shake in afternoon to help with dinner portions or use as meal replacement - Met, continues to go well  3) Increase biking. Set a goal for number of days per week to bike. Record progress on calendar or journal. - Not met    Physical Activity:  Bowling twice weekly  Biking now that its nicer out.  Has a gym membership.  Would like to start walking at the gym more.     Nutrition Prescription  Recommended energy/nutrient modification.    Nutrition Diagnosis  Obesity r/t long history of positive energy balance aeb BMI >30. - Ongoing    Nutrition Intervention  Materials/education provided on hypocaloric diet for weight loss. Discussed following calorie goal or 3945-8433 doris/day for an estimated 1-2 lb wt loss per week.   Reviewed progress towards previous goals. Praised pt on the  "positive change she has made.   Reviewed strategies for increasing physical activity.   Reviewed nutrition-related labs and supplementation for repletion.   Patient demonstrates understanding.  Co-developed goals to work towards.   Provided pt with list of goals and resources below via Laser [employer] Enginest.     Expected Engagement: good  Follow-Up Plans: meal/snack planning     Nutrition Goals  1) 8704-4442 calories/day   2) Continue to use protein shake/bar as breakfast replacement, and snack replacement.   3) Start vitamin D supplement  4) Start a daily Women's multivitamin  5) Walk at the gym once weekly.     Quick/Easy Protein Sources:  Hard boiled eggs  Part-skim cheese sticks  Baby Bell cheese rounds  Low-fat/low-sugar Greek yogurt  Low-fat cottage cheese  Lean deli meat (chicken/turkey/ham)  Roasted chickpeas or lentils  Nuts   Turkey meat stick  Protein shake/bar  \"P3\" snack (cheese, nuts, deli meat)  Aldi's \"Protein Bread\"   \"Egglife\" egg white wrap    Tuna/salmon can/packet     The Plate Method  [email or web address]://[email or web address]    Protein Sources   http://Dark Mail Alliance/935192.pdf     Carbohydrates  http://fvfiles.com/143237.pdf     Mindful Eating  http://Dark Mail Alliance/009922.pdf     Summary of Volumetrics Eating Plan  http://[email or web address].[email or web address]     Healthy Recipe Resources:  Books:  \"The Volumetrics Eating Plan\" by Brenda Foote, Ph.D.  \"Cooking that Counts\" by editors of RSens  \"Calorie Smart Meals\" cookbook by Better Homes and SunLinks (200-500 calorie meals)    Websites:  www.Dexterra  https://www.Forseva.EBOOKAPLACE/  www.Cherry Bugs.org  https://www.diabetesfoodhub.org/all-recipes.html  Https://www.choosemyplate.gov/myplatekitchen  Recipes by Season: https://snaped.fns.usda.gov/recipes-menus   Video Meal Prep: Https://www.youtube.com/c/jamie   Meal Plans: Eatthismuch.com   DASH Diet: https://www.nhlbi.nih.gov/education/dash-eating-plan  Whole Grains [place]: " https://AMS-Qiainscouncil.org/recipes      Cultural Cuisines:  Various Regions of African Cuisine: https://www.First Wave Technologies.LabNow/recipes/23279/cuisines-regions//   Cuisine: https://www.Barosense.org/knowledge-center/recipes/  Mexican and Vegan Cuisine:   https://Lakala/  https://Educerus/recipe-index/  Chinese Cuisine: https://www.BeMo/  South Asian Cuisine:  Jaycee Figueroa on social media- South  high-protein/low-calorie meal/food ideas  Mariel Henderson RD, [facility], plant-based [facility]  Resources: https://www.Adpeps/    Apps:  [employer] veronika (or website, mealime.com)   SpendSmartEatSmart           Follow-Up:  12/2/24    Time spent with patient: 23 minutes.  Chantell Covintgon RD, LD

## 2024-09-30 ENCOUNTER — APPOINTMENT (OUTPATIENT)
Dept: BEHAVIORAL HEALTH | Age: 34
End: 2024-09-30

## 2024-09-30 ENCOUNTER — MYC MEDICAL ADVICE (OUTPATIENT)
Dept: CARDIOLOGY | Facility: CLINIC | Age: 34
End: 2024-09-30

## 2024-09-30 ENCOUNTER — VIRTUAL VISIT (OUTPATIENT)
Dept: ENDOCRINOLOGY | Facility: CLINIC | Age: 34
End: 2024-09-30
Payer: COMMERCIAL

## 2024-09-30 VITALS — HEIGHT: 63 IN | WEIGHT: 287 LBS | BODY MASS INDEX: 50.85 KG/M2

## 2024-09-30 DIAGNOSIS — E66.01 CLASS 3 SEVERE OBESITY WITH SERIOUS COMORBIDITY AND BODY MASS INDEX (BMI) OF 50.0 TO 59.9 IN ADULT, UNSPECIFIED OBESITY TYPE (H): Primary | ICD-10-CM

## 2024-09-30 DIAGNOSIS — K76.0 FATTY LIVER: ICD-10-CM

## 2024-09-30 DIAGNOSIS — Z71.3 NUTRITIONAL COUNSELING: ICD-10-CM

## 2024-09-30 DIAGNOSIS — E66.01 CLASS 3 SEVERE OBESITY DUE TO EXCESS CALORIES WITH SERIOUS COMORBIDITY AND BODY MASS INDEX (BMI) OF 50.0 TO 59.9 IN ADULT (H): Primary | ICD-10-CM

## 2024-09-30 DIAGNOSIS — E66.813 CLASS 3 SEVERE OBESITY WITH SERIOUS COMORBIDITY AND BODY MASS INDEX (BMI) OF 50.0 TO 59.9 IN ADULT, UNSPECIFIED OBESITY TYPE (H): Primary | ICD-10-CM

## 2024-09-30 DIAGNOSIS — E66.813 CLASS 3 SEVERE OBESITY DUE TO EXCESS CALORIES WITH SERIOUS COMORBIDITY AND BODY MASS INDEX (BMI) OF 50.0 TO 59.9 IN ADULT (H): Primary | ICD-10-CM

## 2024-09-30 PROCEDURE — 99207 PR NO CHARGE LOS: CPT | Mod: 95 | Performed by: DIETITIAN, REGISTERED

## 2024-09-30 PROCEDURE — 97803 MED NUTRITION INDIV SUBSEQ: CPT | Mod: 95 | Performed by: DIETITIAN, REGISTERED

## 2024-09-30 ASSESSMENT — PAIN SCALES - GENERAL: PAINLEVEL: NO PAIN (0)

## 2024-09-30 NOTE — NURSING NOTE
Current patient location:  [place], [place]    Is the patient currently in the state of [place]? no    Visit mode:VIDEO    If the visit is dropped, the patient can be reconnected by: VIDEO VISIT: Text to cell phone:   Telephone Information:   Mobile [phone]       Will anyone else be joining the visit? NO  (If patient encounters technical issues they should call [phone]    How would you like to obtain your AVS? MyChart    Are changes needed to the allergy or medication list? Pt stated no changes to allergies and Pt stated no med changes    Are refills needed on medications prescribed by this physician? NO    Reason for visit: RECHECK    Krystina RUSSO

## 2024-09-30 NOTE — PATIENT INSTRUCTIONS
"Jamil SEPULVEDA,    Follow-up with RD on 12/2    Thank you,    Chantell Covington, RD, LD  If you would like to schedule or reschedule an appointment with the RD, please call [phone]    Nutrition Goals  1) 7513-6149 calories/day   2) Continue to use protein shake/bar as breakfast replacement, and snack replacement.   3) Start vitamin D supplement  4) Start a daily Women's multivitamin  5) Walk at the gym once weekly.     Quick/Easy Protein Sources:  Hard boiled eggs  Part-skim cheese sticks  Baby Bell cheese rounds  Low-fat/low-sugar Greek yogurt  Low-fat cottage cheese  Lean deli meat (chicken/turkey/ham)  Roasted chickpeas or lentils  Nuts   Turkey meat stick  Protein shake/bar  \"P3\" snack (cheese, nuts, deli meat)  Aldi's \"Protein Bread\"   \"Egglife\" egg white wrap    Tuna/salmon can/packet     The Plate Method  [email or web address]://[email or web address]    Protein Sources   http://Bivarus/299522.pdf     Carbohydrates  http://fvfiles.com/279307.pdf     Mindful Eating  http://Bivarus/438322.pdf     Summary of Volumetrics Eating Plan  http://fvfiles.com/827200.pdf     Healthy Recipe Resources:  Books:  \"The Volumetrics Eating Plan\" by Brenda Foote, Ph.D.  \"Cooking that Counts\" by editors of BioDetego  \"Calorie Smart Meals\" cookbook by Better Homes and Gardens (200-500 calorie meals)    Websites:  www.ResQâ„¢ Medical  https://www.myGreek/  www.Neo PLM.KaraokeSmart.co  https://www.diabetesfoodhub.org/all-recipes.html  Https://www.choosemyplate.gov/myplatekitchen  Recipes by Season: https://snaped.fns.usda.gov/recipes-menus   Video Meal Prep: Https://www.CaroGentube.com/c/jamie   Meal Plans: EatthiNginxuch.com   DASH Diet: https://www.nhlbi.nih.gov/education/dash-eating-plan  Whole Grains [place]: https://wholegrainscouncil.org/recipes      Cultural Cuisines:  Various Regions of African Cuisine: https://www.AdEx Media.com/recipes/74601/cuisines-regions//   Cuisine: " https://www.Perfect Commerce.org/knowledge-center/recipes/  Mexican and Vegan Cuisine:   https://Spectrum K12 School Solutions/  https://Homefront Learning Center/recipe-index/  Chinese Cuisine: https://www.Turbina Energy AG.shipbeat/  South Asian Cuisine:  Jaycee Figueroa on social media- South  high-protein/low-calorie meal/food ideas  Mariel Henderson RD, [facility], plant-based [place]  Resources: https://www.Tiragiu/    Apps:  APImetrics veronika (or website, mealime.com)   [facility]         COMPREHENSIVE WEIGHT MANAGEMENT PROGRAM  VIRTUAL SUPPORT GROUPS    At [facility], our Comprehensive Weight Management program offers on-line support groups for patients who are working on weight loss and considering, preparing for, or have had weight loss surgery.     There is no cost for this opportunity.  You are invited to attend the?Virtual Support Groups?provided by any of the following locations:    [facility] via Microsoft Teams with Maria D Grubbs RN  2.   [place] via BreakingPoint Systems with Mohsen Perdue, PhD, LP  3.   [place] via BreakingPoint Systems with Bisi Page RN  4.   [facility] via a Zoom Meeting with CHRISTIAN Garcia    The following Support Group information can also be found on our website:  https://www.[facility].org/treatments/weight-loss-and-weight-loss-surgery-support-groups      [facility] Weight Loss Surgery Support Group  The support group is a patient-lead forum that meets monthly to share experiences, encouragement and education. It is open to those who have had weight loss surgery, are scheduled for surgery, or are considering surgery.   WHEN: This group meets on the 3rd Wednesday of each month from 5:00PM - 6:00PM virtually using Microsoft Teams.   FACILITATOR: Led by Maria D Gonzalez RD, LD, RN, the program's Clinical Coordinator.   TO REGISTER: Please contact the clinic via [place] or call the nurse line directly at [phone] to inform our staff that you would like an  "invite sent to you and to let us know the email you would like the invite sent to. Prior to the meeting, a link with directions on how to join the meeting will be sent to you.    2023 and 2024 Meetings   December 20  January 17  February 21  March 20  April 17  May 15  Marley 19      [facility] Bariatric Care Support Group?  This is open to all pre- and post- operative bariatric surgery patients as well as their support system.   WHEN: This group meets the 3rd Tuesday of each month from 6:30 PM - 8:00 PM virtually using Microsoft Teams.   FACILITATOR: Led by Mohsen Perdue, Ph.D who is a Licensed Psychologist with the [facility] Comprehensive Weight Management Program.   TO REGISTER: Please send an email to Mohsen Perdue, Ph.D., LP at?[email or web address]?if you would like an invitation to the group. Prior to the meeting, a link with directions on how to join the meeting will be sent to you.    2023 and 2024 Meetings  December 19 January 16: \"Medication Management and Bariatric Surgery\", Hali Taylor, PharmD, Pharmacy Resident at [facility]  February 20: \"A Bariatric Surgery Patient's Perspective\", MAXIMINO Pagan, [facility], Behavioral Health Clinician at [facility]  March 19  April 16  May 21  Marley 18: \"Nutritional Labeling\", Dietitian speaker to be announced.  November 19: \"Holiday Eating\", Dietitian speaker to be announced.    [facility] Post-Operative Bariatric Surgery Support Group  This is a support group for [facility] bariatric patients (and those external to [facility]) who have had bariatric surgery and are at least 3 months post-surgery.  WHEN: This support group meets the 4th Wednesday of the month from 11:00 AM - 12:00 PM virtually using Microsoft Teams.   FACILITATOR: Led by Certified Bariatric Nurse, Bisi Pgae RN. "   TO REGISTER: Please send an email to Bisi at [email or web address] if you would like an invitation to the group.  Prior to the meeting, a link with directions on how to join the meeting will be sent to you.    2023 and 2024 Meetings  December 27  January 24  February 28  March 27  April 24  May 22  Marley 26    [facility] Healthy Lifestyle Group    Healthy Lifestyle Coaching Group?  This is a 60 minute virtual coaching group for those who want to lead a healthier lifestyle. Come together to set goals and overcome barriers in a supportive group environment. We will address the four pillars of health--nutrition, exercise, sleep and emotional well-being. This group is highly recommended for those who are participating in the 24 week Healthy Lifestyle Plan and our Health Coaching sessions. All are welcome!    WHEN: This group meets the first Friday of the month, 12:30 PM - 1:30 PM online, via a zoom meeting.      FACILITATOR: Led by National Board Certified Health and , Bisi Pelaez, [facility].    TO REGISTER: Please call the Call Center at [phone] to register. You will get an appointment to attend in [facility]. Fifteen minutes prior to the meeting, complete the e-check in and you will get the link to join the meeting.  There is no charge to attend this group and space is limited.  Please register for each month you wish to attend    PLEASE NOTE: There will be NO GROUP on March 7 and July 4, 2025 2024 and 2025 GROUP MEETING DATES:  October 4, 2024  November 1, 2024  December 6, 2024  January 3, 2025  February 7, 2025  No meeting March 7, 2025  April 4, 2025  May 2, 2025  June 6, 2025  No meeting July 4, 2025 August 1, 2025

## 2024-09-30 NOTE — LETTER
"2024       RE: Ben Upton  [address] [address]     Dear Colleague,    Thank you for referring your patient, Ben Upton, to the [facility] [place] at [facility]. Please see a copy of my visit note below.    Video-Visit Details    Type of service:  Video Visit    Video Start Time: 10:22 AM   Video End Time: 10:45 AM    Originating Location (pt. Location): Home    Distant Location (provider location):  Offsite (providers home) [facility] [place]     Platform used for Video Visit: [employer]      Weight Management Nutrition Consultation    Ben Upton is a 33 year old female presents today for return weight management nutrition consultation.  Patient referred by Juliet Emanuel PA-C on 2024.    Patient with Co-morbidities of obesity includin/24/2024     6:38 PM   --   I have the following health issues associated with obesity Fatty Liver    Asthma   I have the following symptoms associated with obesity Depression    Irregular Menstral Cycle         Anthropometrics:  Started Wegovy a few months ago at wt of 310 lbs.     Wt Readings from Last 5 Encounters:   24 131.5 kg (290 lb)   24 131.5 kg (290 lb)   24 133.4 kg (294 lb)   24 137.2 kg (302 lb 8 oz)   18 117 kg (258 lb)     Estimated body mass index is 51.62 kg/m  as calculated from the following:    Height as of this encounter: 1.588 m (5' 2.52\").    Weight as of this encounter: 130.2 kg (287 lb). (-3 lbs from last month, -23 lbs from start of medication)      Medications for Weight Loss:  Zepbound    NUTRITION HISTORY  Food allergies: None  Food intolerances: Dairy  Vitamin/Mineral Supplements: Iron sulfate. States sje has rx for weekly vitamin D she has yet to start.    More recently has been focusing on 1500 doris/day diet, healthier food choices, bought a " bike.   Waking around 7:45 am   Strong cravings for ice cream   Lives with Mom, who cooks a couple meals per week, otherwise she is cooking her own.    Preferences: enjoys variety of foods     8/13/24 - she is planning to increase to 7.5 mg Zepbound. Working on not dining out, planning/prepping more at home. Stocked up on healthy snacks options - turkey sticks, pistachios, fast-breaks; Quest chips;   Less sweets. Went to the [employer], anticipated she was going to get candy there but ended up consciously deciding not to purchase.    Today - Stop nutritional tracking for a bit but restarted after weight loss slowed. [facility] keeping intake to ~1500 doris/d. [place] like she got of track during week of her bday. Doing really well with protein intake. Utilizing protein shake and bar for breakfast replacement. May utilize another protein shake as snack. Doing well with lean protein and veggies at other meals.       Recent Diet Recall:  Breakfast: Protein bar and shake (Premier Protein)  Lunch: adult lunchable and veggie tray on days she works (14 gm protein)    PM snack: Protein shake; part-skim cheese sticks; turkey sticks; yasso bar   Dinner: popcorn chicken (300cal); taco salad with quest tortilla chip  Beverages: Crystal light/water - half gallon yeti daily; sugar-free body armor and lemonade once daily. diet soda once weekly.   Alcohol: None    Progress Towards Previous Goals:  1) 8831-7010 calories/day - Met, continues   2) Can try another protein shake in afternoon to help with dinner portions or use as meal replacement - Met, continues to go well  3) Increase biking. Set a goal for number of days per week to bike. Record progress on calendar or journal. - Not met    Physical Activity:  Bowling twice weekly  Biking now that its nicer out.  Has a gym membership.  Would like to start walking at the gym more.     Nutrition Prescription  Recommended energy/nutrient modification.    Nutrition  "Diagnosis  Obesity r/t long history of positive energy balance aeb BMI >30. - Ongoing    Nutrition Intervention  Materials/education provided on hypocaloric diet for weight loss. Discussed following calorie goal or 5108-3114 doris/day for an estimated 1-2 lb wt loss per week.   Reviewed progress towards previous goals. Praised pt on the positive change she has made.   Reviewed strategies for increasing physical activity.   Reviewed nutrition-related labs and supplementation for repletion.   Patient demonstrates understanding.  Co-developed goals to work towards.   Provided pt with list of goals and resources below via Tanyas Jewelryt.     Expected Engagement: good  Follow-Up Plans: meal/snack planning     Nutrition Goals  1) 8954-8952 calories/day   2) Continue to use protein shake/bar as breakfast replacement, and snack replacement.   3) Start vitamin D supplement  4) Start a daily Women's multivitamin  5) Walk at the gym once weekly.     Quick/Easy Protein Sources:  Hard boiled eggs  Part-skim cheese sticks  Baby Bell cheese rounds  Low-fat/low-sugar Greek yogurt  Low-fat cottage cheese  Lean deli meat (chicken/turkey/ham)  Roasted chickpeas or lentils  Nuts   Turkey meat stick  Protein shake/bar  \"P3\" snack (cheese, nuts, deli meat)  Aldi's \"Protein Bread\"   \"Egglife\" egg white wrap    Tuna/salmon can/packet     The Plate Method  [email or web address]    Protein Sources   http://Skinkers/180925.pdf     Carbohydrates  http://fvfiles.com/503272.pdf     Mindful Eating  http://Skinkers/568363.pdf     Summary of Volumetrics Eating Plan  [email or web address]://[email or web address]/022589.pdf     Healthy Recipe Resources:  Books:  \"The Volumetrics Eating Plan\" by Brenda Foote, Ph.D.  \"Cooking that Counts\" by editors of MIT Energy InitiativeLight  \"Calorie Smart Meals\" cookbook by Better Homes and [employer] Redbaby [employer] Technologys (200-500 calorie " meals)    Websites:  www.ActivityHero  https://www.Spredfashion.OGSystems/  www.Jellycoaster.Socializr  https://www.diabetesfoodhub.org/all-recipes.html  Https://www.choosemyplate.gov/myplatekitchen  Recipes by Season: https://snaped.N12 Technologiess.usda.gov/recipes-menus   Video Meal Prep: Https://www.Planday.com/c/jamie   Meal Plans: EatthiOxane Materials.com   DASH Diet: https://www.nhlbi.nih.gov/education/dash-eating-plan  Whole Grains [place]: https://Markkitcouncil.org/recipes      Cultural Cuisines:  Various Regions of African Cuisine: https://www.Arthena/recipes/68024/cuisines-regions//   Cuisine: https://www.Undertone.org/knowledge-center/recipes/  Mexican and Vegan Cuisine:   https://Teevox/  https://Returbo/recipe-index/  Chinese Cuisine: https://www.Autotether/  South Asian Cuisine:  Jaycee Figueroa on social media- South  high-protein/low-calorie meal/food ideas  Mariel Henderson RD, [facility], plant-based [place]  Resources: https://www.Livestar/    Apps:  Tax Alli veronika (or website, mealime.com)   SpendSmartNimatSmart           Follow-Up:  12/2/24    Time spent with patient: 23 minutes.  Cahntell Covington RD, LD        Again, thank you for allowing me to participate in the care of your patient.      Sincerely,    Chantell Covington RD

## 2024-10-07 NOTE — TELEPHONE ENCOUNTER
Increased dose of zepbound from 7.5 mg per week to 10 mg per week.     Floresita Salinas, PharmD, AAHIVP  Medication Therapy Management Pharmacist

## 2024-10-14 ENCOUNTER — APPOINTMENT (OUTPATIENT)
Dept: BEHAVIORAL HEALTH | Age: 34
End: 2024-10-14

## 2024-10-14 DIAGNOSIS — F90.0 ATTENTION DEFICIT HYPERACTIVITY DISORDER (ADHD), PREDOMINANTLY INATTENTIVE TYPE: ICD-10-CM

## 2024-10-14 DIAGNOSIS — F41.1 GAD (GENERALIZED ANXIETY DISORDER): ICD-10-CM

## 2024-10-14 DIAGNOSIS — F31.9 BIPOLAR 1 DISORDER  (CMD): Primary | ICD-10-CM

## 2024-10-14 DIAGNOSIS — F42.2 MIXED OBSESSIONAL THOUGHTS AND ACTS: ICD-10-CM

## 2024-10-15 ENCOUNTER — LAB SERVICES (OUTPATIENT)
Dept: LAB | Age: 34
End: 2024-10-15

## 2024-10-15 DIAGNOSIS — F31.9 BIPOLAR 1 DISORDER  (CMD): ICD-10-CM

## 2024-10-15 DIAGNOSIS — E61.1 LOW IRON: ICD-10-CM

## 2024-10-15 DIAGNOSIS — Z79.899 MEDICATION MANAGEMENT: ICD-10-CM

## 2024-10-15 LAB
DACRYOCYTES BLD QL SMEAR: NORMAL
DEPRECATED RDW RBC: 45.2 FL (ref 39–50)
EOSINOPHIL # BLD: 0.1 K/MCL (ref 0–0.5)
EOSINOPHIL NFR BLD: 1 %
ERYTHROCYTE [DISTWIDTH] IN BLOOD: 18.9 % (ref 11–15)
FERRITIN SERPL-MCNC: 10 NG/ML (ref 8–252)
HCT VFR BLD CALC: 32.5 % (ref 36–46.5)
HGB BLD-MCNC: 9.4 G/DL (ref 12–15.5)
HYPOCHROMIA BLD QL SMEAR: NORMAL
IRON SATN MFR SERPL: 13 % (ref 15–45)
IRON SERPL-MCNC: 56 MCG/DL (ref 50–170)
LYMPHOCYTES # BLD: 1.7 K/MCL (ref 1–4.8)
LYMPHOCYTES NFR BLD: 17 %
MCH RBC QN AUTO: 20 PG (ref 26–34)
MCHC RBC AUTO-ENTMCNC: 28.9 G/DL (ref 32–36.5)
MCV RBC AUTO: 69.1 FL (ref 78–100)
MONOCYTES # BLD: 0.8 K/MCL (ref 0.3–0.9)
MONOCYTES NFR BLD: 9 %
NEUTROPHILS # BLD: 6.8 K/MCL (ref 1.8–7.7)
NEUTS SEG NFR BLD: 72 %
NRBC BLD MANUAL-RTO: 0 /100 WBC
OVALOCYTES BLD QL SMEAR: NORMAL
PLAT MORPH BLD: NORMAL
PLATELET # BLD AUTO: 358 K/MCL (ref 140–450)
POLYCHROMASIA BLD QL SMEAR: NORMAL
RBC # BLD: 4.7 MIL/MCL (ref 4–5.2)
TIBC SERPL-MCNC: 443 MCG/DL (ref 250–450)
VARIANT LYMPHS NFR BLD: 1 % (ref 0–5)
WBC # BLD: 9.4 K/MCL (ref 4.2–11)
WBC MORPH BLD: NORMAL

## 2024-10-15 PROCEDURE — 82728 ASSAY OF FERRITIN: CPT | Performed by: INTERNAL MEDICINE

## 2024-10-15 PROCEDURE — 83550 IRON BINDING TEST: CPT | Performed by: INTERNAL MEDICINE

## 2024-10-15 PROCEDURE — 83540 ASSAY OF IRON: CPT | Performed by: INTERNAL MEDICINE

## 2024-10-15 PROCEDURE — 36415 COLL VENOUS BLD VENIPUNCTURE: CPT | Performed by: INTERNAL MEDICINE

## 2024-10-15 PROCEDURE — 85027 COMPLETE CBC AUTOMATED: CPT | Performed by: INTERNAL MEDICINE

## 2024-10-18 ENCOUNTER — TELEPHONE (OUTPATIENT)
Dept: INTERNAL MEDICINE | Age: 34
End: 2024-10-18

## 2024-10-18 DIAGNOSIS — D50.8 OTHER IRON DEFICIENCY ANEMIA: Primary | ICD-10-CM

## 2024-10-28 ENCOUNTER — APPOINTMENT (OUTPATIENT)
Dept: LAB | Age: 34
End: 2024-10-28

## 2024-10-28 ENCOUNTER — APPOINTMENT (OUTPATIENT)
Dept: BEHAVIORAL HEALTH | Age: 34
End: 2024-10-28

## 2024-10-28 DIAGNOSIS — F41.1 GAD (GENERALIZED ANXIETY DISORDER): ICD-10-CM

## 2024-10-28 DIAGNOSIS — F31.9 BIPOLAR 1 DISORDER  (CMD): Primary | ICD-10-CM

## 2024-10-28 DIAGNOSIS — F42.2 MIXED OBSESSIONAL THOUGHTS AND ACTS: ICD-10-CM

## 2024-10-28 DIAGNOSIS — F90.0 ATTENTION DEFICIT HYPERACTIVITY DISORDER (ADHD), PREDOMINANTLY INATTENTIVE TYPE: ICD-10-CM

## 2024-11-11 ENCOUNTER — VIRTUAL VISIT (OUTPATIENT)
Dept: PHARMACY | Facility: CLINIC | Age: 34
End: 2024-11-11
Payer: COMMERCIAL

## 2024-11-11 ENCOUNTER — TELEPHONE (OUTPATIENT)
Dept: BEHAVIORAL HEALTH | Age: 34
End: 2024-11-11

## 2024-11-11 ENCOUNTER — APPOINTMENT (OUTPATIENT)
Dept: LAB | Age: 34
End: 2024-11-11

## 2024-11-11 ENCOUNTER — APPOINTMENT (OUTPATIENT)
Dept: BEHAVIORAL HEALTH | Age: 34
End: 2024-11-11

## 2024-11-11 VITALS — WEIGHT: 287 LBS | BODY MASS INDEX: 51.62 KG/M2

## 2024-11-11 DIAGNOSIS — E66.01 CLASS 3 SEVERE OBESITY DUE TO EXCESS CALORIES WITH SERIOUS COMORBIDITY AND BODY MASS INDEX (BMI) OF 50.0 TO 59.9 IN ADULT (H): Primary | ICD-10-CM

## 2024-11-11 DIAGNOSIS — K76.0 FATTY LIVER: ICD-10-CM

## 2024-11-11 DIAGNOSIS — Z79.899 MEDICATION MANAGEMENT: ICD-10-CM

## 2024-11-11 DIAGNOSIS — E66.813 CLASS 3 SEVERE OBESITY DUE TO EXCESS CALORIES WITH SERIOUS COMORBIDITY AND BODY MASS INDEX (BMI) OF 50.0 TO 59.9 IN ADULT (H): Primary | ICD-10-CM

## 2024-11-11 DIAGNOSIS — F31.9 BIPOLAR 1 DISORDER  (CMD): ICD-10-CM

## 2024-11-11 DIAGNOSIS — F41.1 GAD (GENERALIZED ANXIETY DISORDER): ICD-10-CM

## 2024-11-11 DIAGNOSIS — F42.2 MIXED OBSESSIONAL THOUGHTS AND ACTS: ICD-10-CM

## 2024-11-11 DIAGNOSIS — F90.0 ATTENTION DEFICIT HYPERACTIVITY DISORDER (ADHD), PREDOMINANTLY INATTENTIVE TYPE: ICD-10-CM

## 2024-11-11 DIAGNOSIS — F31.9 BIPOLAR 1 DISORDER  (CMD): Primary | ICD-10-CM

## 2024-11-11 LAB
BASOPHILS # BLD: 0 K/MCL (ref 0–0.3)
BASOPHILS NFR BLD: 0 %
DEPRECATED RDW RBC: 45.3 FL (ref 39–50)
EOSINOPHIL # BLD: 0.2 K/MCL (ref 0–0.5)
EOSINOPHIL NFR BLD: 3 %
ERYTHROCYTE [DISTWIDTH] IN BLOOD: 19.8 % (ref 11–15)
HCT VFR BLD CALC: 31.3 % (ref 36–46.5)
HGB BLD-MCNC: 9.2 G/DL (ref 12–15.5)
IMM GRANULOCYTES # BLD AUTO: 0 K/MCL (ref 0–0.2)
IMM GRANULOCYTES # BLD: 0 %
LYMPHOCYTES # BLD: 2.3 K/MCL (ref 1–4.8)
LYMPHOCYTES NFR BLD: 25 %
MCH RBC QN AUTO: 19.7 PG (ref 26–34)
MCHC RBC AUTO-ENTMCNC: 29.4 G/DL (ref 32–36.5)
MCV RBC AUTO: 67 FL (ref 78–100)
MONOCYTES # BLD: 0.5 K/MCL (ref 0.3–0.9)
MONOCYTES NFR BLD: 6 %
NEUTROPHILS # BLD: 6 K/MCL (ref 1.8–7.7)
NEUTROPHILS NFR BLD: 66 %
NRBC BLD MANUAL-RTO: 0 /100 WBC
PLATELET # BLD AUTO: 328 K/MCL (ref 140–450)
RBC # BLD: 4.67 MIL/MCL (ref 4–5.2)
WBC # BLD: 9.1 K/MCL (ref 4.2–11)

## 2024-11-11 PROCEDURE — 85025 COMPLETE CBC W/AUTO DIFF WBC: CPT | Performed by: INTERNAL MEDICINE

## 2024-11-11 PROCEDURE — 36415 COLL VENOUS BLD VENIPUNCTURE: CPT | Performed by: INTERNAL MEDICINE

## 2024-11-11 RX ORDER — ERGOCALCIFEROL 1.25 MG/1
50000 CAPSULE ORAL
COMMUNITY
Start: 2024-09-10

## 2024-11-11 ASSESSMENT — PAIN SCALES - GENERAL: PAINLEVEL_OUTOF10: NO PAIN (0)

## 2024-11-11 NOTE — PATIENT INSTRUCTIONS
"Recommendations from today's MTM visit:                                                      Increase Zepbound to 12.5 mg once weekly - 90 day supply   Discuss starting naltrexone with Juliet Emanuel PA-C  Take naltrexone 1/4 tablet for 7 days and then increase to 1/2 tablet daily. Take 1 hour before strongest cravings. Don't take right before bed because it can cause trouble sleeping. Monitor for nausea.   Zepbound won't be covered on [facility] [employer] insurance next year. Will have to see if covered on [facility] insurance next year.     Follow-up: 2 months with Juliet Emanuel PA-C, 4 week mychart check-in with medication therapy management and then 3 months     It was great speaking with you today.  I value your experience and would be very thankful for your time in providing feedback in our clinic survey. In the next few days, you may receive an email or text message from Twinklr with a link to a survey related to your  clinical pharmacist.\"     To schedule another MTM appointment, please call the clinic directly or you may call the MTM scheduling line at [phone].    My Clinical Pharmacist's contact information:                                                      Please feel free to contact me with any questions or concerns you have.      Floresita Salinas, PharmD, AAHIVP  Medication Therapy Management Pharmacist      "

## 2024-11-11 NOTE — PROGRESS NOTES
Medication Therapy Management (MTM) Encounter    ASSESSMENT:                            Medication Adherence/Access: No issues identified. Recommend re-starting iron supplement. Consider adding to pill packs to help with adherence.     Weight Management /NAFLD:  Weight loss hasn't been progressing much the last 5 months. Tolerating Zepbound well without side effects. Would benefit from a dose increase. Other medication options to help with cravings include bupropion, topiramate, and naltrexone. Avoiding phentermine due to bipolar 1 diagnosis and risk for worsening anxiety. Would need to discuss starting bupropion or topiramate with psychiatrist prior to starting as well. Since she has strong sweet cravings, could start with naltrexone. Discussed side effects and that it makes opioids less effective if she needs to take opioids for something in the future.     Zepbound not covered on [employer] insurance but it might be covered on secondary insurance. Will have to check coverage next year.     PLAN:                            Increase Zepbound to 12.5 mg once weekly - 90 day supply   Discuss starting naltrexone with Juliet Emanuel PA-C  Take naltrexone 1/4 tablet for 7 days and then increase to 1/2 tablet daily. Take 1 hour before strongest cravings. Don't take right before bed because it can cause trouble sleeping. Monitor for nausea.   Zepbound won't be covered on [facility] [employer] insurance next year. Will have to see if covered on [facility] insurance next year.     Follow-up: 2 months with Juliet Emanuel PA-C, 4 week mychart check-in with medication therapy management and then 3 months     SUBJECTIVE/OBJECTIVE:                          DERICK Upton is a 34 year old female seen for a follow-up visit.       Reason for visit: zepbound follow-up.    Allergies/ADRs: Reviewed in chart  Past Medical History: Reviewed in chart  Tobacco: She reports that she has never smoked. She has never used smokeless tobacco.  Alcohol:  "none  [employer] insurance- Lives in [place]  Medication Adherence/Access: GLP-1 RA not covered next year on [employer] insurance. She forgets to take her iron supplement.     Weight Management /NAFLD:  Zepbound 10 mg once weekly - one month so far.     Has primary care provider in [place]. Following with Juliet Emanuel PA-C for weight management clinic. Struggling with strong cravings. Doesn't feel like she gets full very fast. She's interested in starting bupropion to see if it helps reduce cravings.    Diet/Eating Habits: eating out 1-2 times per week. Includes protein shakes twice daily to help reduce portions with dinner. Struggles with sweets cravings which is worse when she has her period. More snacking at night.  Exercise/Activity: would like to walk the mall more often. Goal is three days per week.   Tried/Failed/Contraindicated Medications:   No hx pancreatitis/thyroid cancer  On clozapine which is associated with a significant amount of weight gain.   Metformin: diarrhea   Wegovy: tolerated okay but zepbound works better     Initial Consult Weight: 309.5 lbs  A1c: 5.3% - 12/29/23 per care everywhere     Current weight today: 287 lbs 0 oz  Cumulative Weight Loss: -23 lb, -7.4% from baseline    Wt Readings from Last 4 Encounters:   11/11/24 287 lb (130.2 kg)   09/30/24 287 lb (130.2 kg)   08/09/24 290 lb (131.5 kg)   06/27/24 290 lb (131.5 kg)     Estimated body mass index is 51.62 kg/m  as calculated from the following:    Height as of 9/30/24: 5' 2.52\" (1.588 m).    Weight as of this encounter: 287 lb (130.2 kg).      Today's Vitals: Wt 287 lb (130.2 kg)   BMI 51.62 kg/m    ----------------    I spent 21 minutes with this patient today. All changes were made via collaborative practice agreement with Juliet Emanuel PA-C and Marilynn Goldman CNP  A copy of the visit note was provided to the patient's provider(s).    A summary of these recommendations was sent via [employer].      Telemedicine Visit " Details  The patient's medications can be safely assessed via a telemedicine encounter.  Type of service:  Telephone visit  Originating Location (pt. Location): Home    Distant Location (provider location):  Off-site  Start Time: 9:04 AM  End Time:  9:25 am     Medication Therapy Recommendations  Class 3 severe obesity due to excess calories with serious comorbidity and body mass index (BMI) of 50.0 to 59.9 in adult (H)   1 Current Medication: tirzepatide-Weight Management (ZEPBOUND) 10 MG/0.5ML prefilled pen   Current Medication Sig: Inject 0.5 mLs (10 mg) subcutaneously every 7 days.   Rationale: Dose too low - Dosage too low - Effectiveness   Recommendation: Increase Dose - Zepbound 12.5 MG/0.5ML Soaj   Status: Accepted per CPA   Identified Date: 11/11/2024 Completed Date: 11/11/2024         2 Current Medication: tirzepatide-Weight Management (ZEPBOUND) 10 MG/0.5ML prefilled pen   Current Medication Sig: Inject 0.5 mLs (10 mg) subcutaneously every 7 days.   Rationale: Synergistic therapy - Needs additional medication therapy - Indication   Recommendation: Start Medication - NALTREXONE HCL PO   Status: Accepted per CPA   Identified Date: 11/11/2024 Completed Date: 11/11/2024

## 2024-11-11 NOTE — NURSING NOTE
Current patient location: [address] [place] [id number]    Is the patient currently in the state of [place]? NO    Visit mode:TELEPHONE    If the visit is dropped, the patient can be reconnected by: TELEPHONE VISIT: Phone number:   Telephone Information:   Mobile [phone]       Will anyone else be joining the visit? NO  (If patient encounters technical issues they should call [phone]    Are changes needed to the allergy or medication list? No, Pt stated no changes to allergies, and Pt stated no med changes    Are refills needed on medications prescribed by this physician? Discuss with provider    Rooming Documentation:  Not applicable    Reason for visit: Medication Therapy Management    Alicia RUSSO    Pt is in [place].

## 2024-11-11 NOTE — Clinical Note
Vinicio Chung - would you be okay with CJ trying naltrexone for strong sugar cravings? We'd be going up on zepbound at the same time. Other meds would require discussing with psychiatrist. Not on opioids and liver function stable. Let me know. Thanks!

## 2024-11-12 ENCOUNTER — MYC MEDICAL ADVICE (OUTPATIENT)
Dept: PHARMACY | Facility: CLINIC | Age: 34
End: 2024-11-12
Payer: COMMERCIAL

## 2024-11-13 RX ORDER — NALTREXONE HYDROCHLORIDE 50 MG/1
TABLET, FILM COATED ORAL
Qty: 15 TABLET | Refills: 3 | Status: SHIPPED | OUTPATIENT
Start: 2024-11-13

## 2024-11-13 NOTE — PROGRESS NOTES
Okay to start naltrexone per KAILASH Cardenas, PharmD, AAHIVP  Medication Therapy Management Pharmacist

## 2024-11-18 ENCOUNTER — APPOINTMENT (OUTPATIENT)
Dept: BEHAVIORAL HEALTH | Age: 34
End: 2024-11-18

## 2024-11-18 DIAGNOSIS — F41.1 GAD (GENERALIZED ANXIETY DISORDER): ICD-10-CM

## 2024-11-18 DIAGNOSIS — G24.01 TARDIVE DYSKINESIA: ICD-10-CM

## 2024-11-18 DIAGNOSIS — F90.0 ATTENTION DEFICIT HYPERACTIVITY DISORDER (ADHD), PREDOMINANTLY INATTENTIVE TYPE: ICD-10-CM

## 2024-11-18 DIAGNOSIS — F42.2 MIXED OBSESSIONAL THOUGHTS AND ACTS: ICD-10-CM

## 2024-11-18 DIAGNOSIS — G47.09 INITIAL INSOMNIA: ICD-10-CM

## 2024-11-18 DIAGNOSIS — F31.9 BIPOLAR 1 DISORDER  (CMD): Primary | ICD-10-CM

## 2024-11-18 PROCEDURE — 99214 OFFICE O/P EST MOD 30 MIN: CPT

## 2024-11-18 RX ORDER — BUPROPION HYDROCHLORIDE 150 MG/1
150 TABLET ORAL DAILY
Qty: 30 TABLET | Refills: 2 | Status: SHIPPED | OUTPATIENT
Start: 2024-11-18

## 2024-11-20 ENCOUNTER — E-ADVICE (OUTPATIENT)
Dept: INTERNAL MEDICINE | Age: 34
End: 2024-11-20

## 2024-11-20 DIAGNOSIS — E66.813 CLASS 3 SEVERE OBESITY WITH SERIOUS COMORBIDITY AND BODY MASS INDEX (BMI) OF 50.0 TO 59.9 IN ADULT, UNSPECIFIED OBESITY TYPE (CMD): Primary | ICD-10-CM

## 2024-11-20 DIAGNOSIS — E66.01 CLASS 3 SEVERE OBESITY WITH SERIOUS COMORBIDITY AND BODY MASS INDEX (BMI) OF 50.0 TO 59.9 IN ADULT, UNSPECIFIED OBESITY TYPE (CMD): Primary | ICD-10-CM

## 2024-11-21 DIAGNOSIS — E55.9 VITAMIN D INSUFFICIENCY: ICD-10-CM

## 2024-11-21 RX ORDER — ERGOCALCIFEROL 1.25 MG/1
1.25 CAPSULE, LIQUID FILLED ORAL
Qty: 4 CAPSULE | Refills: 0 | Status: SHIPPED | OUTPATIENT
Start: 2024-11-21

## 2024-11-21 RX ORDER — TIRZEPATIDE 12.5 MG/.5ML
12.5 INJECTION, SOLUTION SUBCUTANEOUS
COMMUNITY
Start: 2024-11-14 | End: 2024-11-22 | Stop reason: DRUGHIGH

## 2024-11-22 RX ORDER — TIRZEPATIDE 12.5 MG/.5ML
12.5 INJECTION, SOLUTION SUBCUTANEOUS
Qty: 2 ML | Refills: 1 | Status: SHIPPED | OUTPATIENT
Start: 2024-11-22

## 2024-11-25 ENCOUNTER — TELEPHONE (OUTPATIENT)
Dept: INTERNAL MEDICINE | Age: 34
End: 2024-11-25

## 2024-11-25 ENCOUNTER — APPOINTMENT (OUTPATIENT)
Dept: BEHAVIORAL HEALTH | Age: 34
End: 2024-11-25

## 2024-11-25 DIAGNOSIS — F42.2 MIXED OBSESSIONAL THOUGHTS AND ACTS: ICD-10-CM

## 2024-11-25 DIAGNOSIS — F41.1 GAD (GENERALIZED ANXIETY DISORDER): ICD-10-CM

## 2024-11-25 DIAGNOSIS — F31.9 BIPOLAR 1 DISORDER  (CMD): Primary | ICD-10-CM

## 2024-11-25 DIAGNOSIS — F90.0 ATTENTION DEFICIT HYPERACTIVITY DISORDER (ADHD), PREDOMINANTLY INATTENTIVE TYPE: ICD-10-CM

## 2024-11-25 PROCEDURE — 90832 PSYTX W PT 30 MINUTES: CPT | Performed by: COUNSELOR

## 2024-11-27 ENCOUNTER — LAB SERVICES (OUTPATIENT)
Dept: LAB | Age: 34
End: 2024-11-27

## 2024-11-27 DIAGNOSIS — E55.9 VITAMIN D INSUFFICIENCY: ICD-10-CM

## 2024-11-27 DIAGNOSIS — E61.1 LOW IRON: ICD-10-CM

## 2024-11-27 DIAGNOSIS — D50.8 OTHER IRON DEFICIENCY ANEMIA: ICD-10-CM

## 2024-11-27 LAB
BASOPHILS # BLD: 0 K/MCL (ref 0–0.3)
BASOPHILS NFR BLD: 0 %
DEPRECATED RDW RBC: 46.8 FL (ref 39–50)
EOSINOPHIL # BLD: 0 K/MCL (ref 0–0.5)
EOSINOPHIL NFR BLD: 0 %
ERYTHROCYTE [DISTWIDTH] IN BLOOD: 19.9 % (ref 11–15)
FERRITIN SERPL-MCNC: 12 NG/ML (ref 8–252)
HCT VFR BLD CALC: 33.8 % (ref 36–46.5)
HGB BLD-MCNC: 9.7 G/DL (ref 12–15.5)
IMM GRANULOCYTES # BLD AUTO: 0.1 K/MCL (ref 0–0.2)
IMM GRANULOCYTES # BLD: 1 %
IRON SATN MFR SERPL: 5 % (ref 15–45)
IRON SERPL-MCNC: 22 MCG/DL (ref 50–170)
LYMPHOCYTES # BLD: 2.1 K/MCL (ref 1–4.8)
LYMPHOCYTES NFR BLD: 22 %
MCH RBC QN AUTO: 19.4 PG (ref 26–34)
MCHC RBC AUTO-ENTMCNC: 28.7 G/DL (ref 32–36.5)
MCV RBC AUTO: 67.5 FL (ref 78–100)
MONOCYTES # BLD: 0.6 K/MCL (ref 0.3–0.9)
MONOCYTES NFR BLD: 6 %
NEUTROPHILS # BLD: 6.9 K/MCL (ref 1.8–7.7)
NEUTROPHILS NFR BLD: 71 %
NRBC BLD MANUAL-RTO: 0 /100 WBC
PLATELET # BLD AUTO: 355 K/MCL (ref 140–450)
RBC # BLD: 5.01 MIL/MCL (ref 4–5.2)
TIBC SERPL-MCNC: 422 MCG/DL (ref 250–450)
WBC # BLD: 9.6 K/MCL (ref 4.2–11)

## 2024-11-27 PROCEDURE — 36415 COLL VENOUS BLD VENIPUNCTURE: CPT | Performed by: INTERNAL MEDICINE

## 2024-11-27 PROCEDURE — 85025 COMPLETE CBC W/AUTO DIFF WBC: CPT | Performed by: INTERNAL MEDICINE

## 2024-11-27 PROCEDURE — 83550 IRON BINDING TEST: CPT | Performed by: INTERNAL MEDICINE

## 2024-11-27 PROCEDURE — 82306 VITAMIN D 25 HYDROXY: CPT | Performed by: CLINICAL MEDICAL LABORATORY

## 2024-11-27 PROCEDURE — 83540 ASSAY OF IRON: CPT | Performed by: INTERNAL MEDICINE

## 2024-11-27 PROCEDURE — 82728 ASSAY OF FERRITIN: CPT | Performed by: INTERNAL MEDICINE

## 2024-11-28 LAB — 25(OH)D3+25(OH)D2 SERPL-MCNC: 15.1 NG/ML (ref 30–100)

## 2024-11-29 ENCOUNTER — TELEPHONE (OUTPATIENT)
Dept: BEHAVIORAL HEALTH | Age: 34
End: 2024-11-29

## 2024-11-29 NOTE — PROGRESS NOTES
"Video-Visit Details    Type of service:  Video Visit    Video Start Time: 12:29 PM   Video End Time: 12:51 PM     Originating Location (pt. Location): Home    Distant Location (provider location):  Offsite (providers home) [facility] [place]     Platform used for Video Visit: "[employer]      Weight Management Nutrition Consultation    Ben Upton is a 34 year old female presents today for return weight management nutrition consultation.  Patient referred by Juliet Emanuel PA-C on 2024.    Patient with Co-morbidities of obesity includin/24/2024     6:38 PM   --   I have the following health issues associated with obesity Fatty Liver    Asthma   I have the following symptoms associated with obesity Depression    Irregular Menstral Cycle         Anthropometrics:  Started Wegovy a few months ago at wt of 310 lbs.     Wt Readings from Last 5 Encounters:   24 130.2 kg (287 lb)   24 130.2 kg (287 lb)   24 130.2 kg (287 lb)   24 131.5 kg (290 lb)   24 131.5 kg (290 lb)     Estimated body mass index is 51.66 kg/m  as calculated from the following:    Height as of this encounter: 1.588 m (5' 2.5\").    Weight as of this encounter: 130.2 kg (287 lb). (-0 lbs from last month, -23 lbs from start of medication)      Medications for Weight Loss:  Zepbound, naltrexone     NUTRITION HISTORY  Food allergies: None  Food intolerances: Dairy  Vitamin/Mineral Supplements: Once weekly vitamin D.     More recently has been focusing on 1500 doris/day diet, healthier food choices, bought a bike.   Waking around 7:45 am   Strong cravings for ice cream   Lives with Mom, who cooks a couple meals per week, otherwise she is cooking her own.    Preferences: enjoys variety of foods     24 - she is planning to increase to 7.5 mg Zepbound. Working on not dining out, planning/prepping more at home. Stocked up on healthy snacks options - turkey sticks, " pistachios, fast-breaks; Quest chips;   Less sweets. Went to the [employer], anticipated she was going to get candy there but ended up consciously deciding not to purchase.    9/30/24 - Stop nutritional tracking for a bit but restarted after weight loss slowed. Tracking in [facility], keeping intake to ~1500 doris/d. Cooter like she got of track during week of her bday. Doing really well with protein intake. Utilizing protein shake and bar for breakfast replacement. May utilize another protein shake as snack. Doing well with lean protein and veggies at other meals.       Today - Just start 12.5 mg Zepbound.  First couple days after injection, feels nauseous after eating. Will lose coverage in Jan 2025. Continues to have challenge with snacking. Recently started on naltrexone and Wellbutrin.    Recent Diet Recall:  Breakfast: Protein bar or shake (Premier Protein)  Lunch: adult lunchable (13 gm pro, 300 doris) and veggie tray on days she works (14 gm protein)    Dinner: Taco dip and tortilla chips; roasted veggies and pre-grilled chicken.   Snacks:   Beverages: Crystal light/water - half gallon yeti daily; sugar-free body armor and lemonade once daily. diet soda once weekly.   Alcohol: None  Dining-out: cut Culvers down to once monthly.     Progress Towards Previous Goals:  1) 3505-0231 calories/day - Met intermittently, does identify doing better with calorie intake while tracking.   2) Continue to use protein shake/bar as breakfast replacement, and snack replacement.   3) Start vitamin D supplement - Not met lately, would like to re-incorporate.   4) Start a daily Women's multivitamin - not met  5) Walk at the gym once weekly. - Not as much walking with cold weather. Hasn't been to the gym lately. Identifies needing to schedule into week.     Physical Activity:  Bowling once weekly  Has a gym membership. Would like to start walking at the gym more.     Nutrition Prescription  Recommended energy/nutrient  "modification.    Nutrition Diagnosis  Obesity r/t long history of positive energy balance aeb BMI >30. - Ongoing    Nutrition Intervention  Materials/education provided on hypocaloric diet for weight loss. Discussed following calorie goal or 6872-6668 doris/day for an estimated 1-2 lb wt loss per week.   Reviewed progress towards previous goals. Praised pt on the positive change she has sustained.   Reviewed strategies for increasing physical activity, with emphasis on strengthening.   Reviewed nutrition-related labs and supplementation for repletion.   Patient demonstrates understanding.  Co-developed goals to work towards.   Provided pt with list of goals and resources below via [employer].     Expected Engagement: good  Follow-Up Plans: meal/snack planning     Nutrition Goals  1) Restart nutritional tracking. Aim for 0648-6038 calories/day   2) 30+ gm protein at each eating encounter (at least 75 gm protein daily)   3) Schedule gym session twice weekly.     Example Meal Plan:  Breakfast: breakfast bar + 2 string cheese  Lunch: lunchable/tuna pack + shake and mini veggie tray   Afternoon-snack: protein shake;   Dinner: palm-size meat/fish + 1/2 cup carb and 2 cup veggies     Quick/Easy Protein Sources:  Hard boiled eggs  Part-skim cheese sticks  Baby Bell cheese rounds  Low-fat/low-sugar Greek yogurt  Low-fat cottage cheese  Lean deli meat (chicken/turkey/ham)  Roasted chickpeas or lentils  Nuts   Turkey meat stick  Protein shake/bar  \"P3\" snack (cheese, nuts, deli meat)  [employer]i's \"Protein Bread\"   \"Egglife\" egg white wrap    Tuna/salmon can/packet     The Plate Method  [email or web address]    Protein Sources   [email or web address]     Carbohydrates  [email or web address]     Mindful Eating  [email or web address]     Summary of Volumetrics Eating Plan  [email or web address]           Follow-Up:  2/10/25    Time spent with patient: 22 minutes.  Chantell Covington RD, LD      "

## 2024-12-01 SDOH — ECONOMIC STABILITY: TRANSPORTATION INSECURITY
IN THE PAST 12 MONTHS, HAS LACK OF RELIABLE TRANSPORTATION KEPT YOU FROM MEDICAL APPOINTMENTS, MEETINGS, WORK OR FROM GETTING THINGS NEEDED FOR DAILY LIVING?: NO

## 2024-12-01 SDOH — ECONOMIC STABILITY: HOUSING INSECURITY: WHAT IS YOUR LIVING SITUATION TODAY?: I HAVE A STEADY PLACE TO LIVE

## 2024-12-01 SDOH — ECONOMIC STABILITY: FOOD INSECURITY: WITHIN THE PAST 12 MONTHS, THE FOOD YOU BOUGHT JUST DIDN'T LAST AND YOU DIDN'T HAVE MONEY TO GET MORE.: NEVER TRUE

## 2024-12-01 SDOH — ECONOMIC STABILITY: GENERAL: WOULD YOU LIKE HELP WITH ANY OF THE FOLLOWING NEEDS?: I DON'T WANT HELP WITH ANY OF THESE

## 2024-12-01 SDOH — ECONOMIC STABILITY: HOUSING INSECURITY: DO YOU HAVE PROBLEMS WITH ANY OF THE FOLLOWING?: PATIENT DECLINED

## 2024-12-01 ASSESSMENT — SOCIAL DETERMINANTS OF HEALTH (SDOH): IN THE PAST 12 MONTHS, HAS THE ELECTRIC, GAS, OIL, OR WATER COMPANY THREATENED TO SHUT OFF SERVICE IN YOUR HOME?: NO

## 2024-12-02 ENCOUNTER — VIRTUAL VISIT (OUTPATIENT)
Dept: ENDOCRINOLOGY | Facility: CLINIC | Age: 34
End: 2024-12-02
Payer: COMMERCIAL

## 2024-12-02 ENCOUNTER — APPOINTMENT (OUTPATIENT)
Dept: INTERNAL MEDICINE | Age: 34
End: 2024-12-02

## 2024-12-02 VITALS
SYSTOLIC BLOOD PRESSURE: 120 MMHG | DIASTOLIC BLOOD PRESSURE: 88 MMHG | OXYGEN SATURATION: 99 % | HEART RATE: 64 BPM | BODY MASS INDEX: 53.92 KG/M2 | HEIGHT: 62 IN | WEIGHT: 293 LBS

## 2024-12-02 VITALS — BODY MASS INDEX: 50.85 KG/M2 | HEIGHT: 63 IN | WEIGHT: 287 LBS

## 2024-12-02 DIAGNOSIS — Z23 NEED FOR VACCINATION: Primary | ICD-10-CM

## 2024-12-02 DIAGNOSIS — J45.20 MILD INTERMITTENT ASTHMA WITHOUT COMPLICATION (CMD): ICD-10-CM

## 2024-12-02 DIAGNOSIS — E66.813 CLASS 3 SEVERE OBESITY WITH SERIOUS COMORBIDITY AND BODY MASS INDEX (BMI) OF 50.0 TO 59.9 IN ADULT, UNSPECIFIED OBESITY TYPE (H): ICD-10-CM

## 2024-12-02 DIAGNOSIS — Z71.3 NUTRITIONAL COUNSELING: Primary | ICD-10-CM

## 2024-12-02 DIAGNOSIS — E66.01 CLASS 3 SEVERE OBESITY WITH SERIOUS COMORBIDITY AND BODY MASS INDEX (BMI) OF 50.0 TO 59.9 IN ADULT, UNSPECIFIED OBESITY TYPE (H): ICD-10-CM

## 2024-12-02 DIAGNOSIS — K76.0 FATTY LIVER: ICD-10-CM

## 2024-12-02 DIAGNOSIS — D50.8 OTHER IRON DEFICIENCY ANEMIA: ICD-10-CM

## 2024-12-02 PROCEDURE — 90480 ADMN SARSCOV2 VAC 1/ONLY CMP: CPT | Performed by: INTERNAL MEDICINE

## 2024-12-02 PROCEDURE — 99214 OFFICE O/P EST MOD 30 MIN: CPT | Performed by: INTERNAL MEDICINE

## 2024-12-02 PROCEDURE — 99207 PR NO CHARGE LOS: CPT | Mod: 95 | Performed by: DIETITIAN, REGISTERED

## 2024-12-02 PROCEDURE — 97803 MED NUTRITION INDIV SUBSEQ: CPT | Mod: 95 | Performed by: DIETITIAN, REGISTERED

## 2024-12-02 PROCEDURE — 91322 SARSCOV2 VAC 50 MCG/0.5ML IM: CPT | Performed by: INTERNAL MEDICINE

## 2024-12-02 RX ORDER — FERROUS SULFATE 325(65) MG
325 TABLET ORAL
Qty: 90 TABLET | Refills: 1 | Status: SHIPPED | OUTPATIENT
Start: 2024-12-02

## 2024-12-02 RX ORDER — ALBUTEROL SULFATE 0.83 MG/ML
2.5 SOLUTION RESPIRATORY (INHALATION) EVERY 4 HOURS PRN
Qty: 3 ML | Refills: 11 | Status: SHIPPED | OUTPATIENT
Start: 2024-12-02

## 2024-12-02 ASSESSMENT — PAIN SCALES - GENERAL: PAINLEVEL_OUTOF10: NO PAIN (0)

## 2024-12-02 NOTE — PATIENT INSTRUCTIONS
"Hi DERICK,    Follow-up with dietitian on 2/10    Thank you,    Chantell Covington, RD, LD  If you would like to schedule or reschedule an appointment with the RD, please call [phone]    Nutrition Goals  1) Restart nutritional tracking. Aim for 1493-6104 calories/day   2) 30+ gm protein at each eating encounter (at least 75 gm protein daily)   3) Schedule gym session twice weekly.     Example Meal Plan:  Breakfast: breakfast bar + 2 string cheese  Lunch: lunchable/tuna pack + shake and mini veggie tray   Afternoon-snack: protein shake;   Dinner: palm-size meat/fish + 1/2 cup carb and 2 cup veggies     Quick/Easy Protein Sources:  Hard boiled eggs  Part-skim cheese sticks  Baby Bell cheese rounds  Low-fat/low-sugar Greek yogurt  Low-fat cottage cheese  Lean deli meat (chicken/turkey/ham)  Roasted chickpeas or lentils  Nuts   Turkey meat stick  Protein shake/bar  \"P3\" snack (cheese, nuts, deli meat)  Aldi's \"Protein Bread\"   \"Egglife\" egg white wrap    Tuna/salmon can/packet     The Plate Method  [email or web address]    Protein Sources   [email or web address]     Carbohydrates  [email or web address]     Mindful Eating  [email or web address]     Summary of Volumetrics Eating Plan  [email or web address]                   [facility]   Healthy Lifestyle Group    Healthy Lifestyle Coaching Group?  This is a 60 minute virtual coaching group for those who want to lead a healthier lifestyle. Come together to set goals and overcome barriers in a supportive group environment. We will address the four pillars of health--nutrition, exercise, sleep and emotional well-being. This group is highly recommended for those who are participating in the 24 week Healthy Lifestyle Plan and our Health Coaching sessions. All are welcome!    WHEN: This group meets the first Friday of the month, 12:30 PM - 1:30 PM online, via a zoom meeting.      FACILITATOR: Led " by National Board Certified Health and , Bisi Pelaez [facility].     TO REGISTER: Please call the Call Center at [phone] to register. You will get an appointment to attend in [employer]. Fifteen minutes prior to the meeting, complete the e-check in and you will get the link to join the meeting.  There is no charge to attend this group and space is limited.   Please register for each month you wish to attend    PLEASE NOTE: There will be NO GROUP on March 7 and July 4, 2025 2024 and 2025 GROUP MEETING DATES:   October 4, 2024  November 1, 2024  December 6, 2024  January 3, 2025  February 7, 2025  No meeting March 7, 2025  April 4, 2025  May 2, 2025  June 6, 2025  No meeting July 4, 2025 August 1, 2025      Work with A Health !  Virtual 1:1 Sessions are Available through [facility]    To learn more, call to schedule a free, Health  Q&A appointment: [phone]     What is Health Coaching?  Do you know what you are supposed to do, but you just aren't doing it?  Then, HEALTH COACHING may help you!   Get unstuck and move forward with the support of a professionally trained [facility] (National Board-Certified Health and ) who uses evidence-based approaches to help you move forward with healthy lifestyle changes in the areas of weight loss, stress management and overall well-being.    Health Coaches help you identify goals that will work best for you. Health Coaches provide support and encouragement with overcoming barriers and help you to find inspiration and motivation to lead a healthy lifestyle.    Option one:  Health Coaching 3-Pack; Three, 30-minute Health Coaching Visits, for $99  Visits are done virtually (phone or video)  This is a self pay service; we do not accept insurance for steven coaching.    Option two:   The 24 week Plan; 11 Health Coaching Visits, and a 7 months subscription to [employer]- on-demand fitness, nutrition and mindfulness  classes, for $499 (employee discounts may be available). Participants will also meet regularly with a weight management Medical Provider and a Registered/Licensed Dietician.  This is a self-pay service; we do not accept insurance for health coaching.    To Schedule a free Health  Q&A appointment to learn more,  call [phone].

## 2024-12-02 NOTE — NURSING NOTE
Current patient location: home    Is the patient currently in the state of [place]? YES    Visit mode:VIDEO    If the visit is dropped, the patient can be reconnected by: VIDEO VISIT: Text to cell phone:   Telephone Information:   Mobile [phone]       Will anyone else be joining the visit? NO  (If patient encounters technical issues they should call [phone])    Are changes needed to the allergy or medication list? N/A    Are refills needed on medications prescribed by this physician? NO    Rooming Documentation:  Not applicable      Reason for visit: RECHECK    Wt other than 24 hrs:  couple days ago  Pain more than one location:  no  Fannie BARBOZA

## 2024-12-02 NOTE — PROGRESS NOTES
"Virtual Visit Details    Type of service:  Video Visit   Video Start Time: {video visit start/end time for provider to select:936656}  Video End Time:{video visit start/end time for provider to select:967302}    Originating Location (pt. Location): {video visit patient location:187138::\"Home\"}  {PROVIDER LOCATION On-site should be selected for visits conducted from your clinic location or adjoining [facility] hospital, academic office, or other nearby [facility] building. Off-site should be selected for all other provider locations, including home:[address]}  Distant Location (provider location):  {virtual location provider:782525}  Platform used for Video Visit: {Virtual Visit Platforms:116857::\"[employer]\"}  "

## 2024-12-02 NOTE — LETTER
"2024       RE: Ben Upton  [address] [facility] [address]     Dear Colleague,    Thank you for referring your patient, Ben Upton, to the [facility] [place] at [facility]. Please see a copy of my visit note below.    Video-Visit Details    Type of service:  Video Visit    Video Start Time: 12:29 PM   Video End Time: 12:51 PM     Originating Location (pt. Location): Home    Distant Location (provider location):  Offsite (providers home) [facility] [place]     Platform used for Video Visit: [employer]      Weight Management Nutrition Consultation    Ben Upton is a 34 year old female presents today for return weight management nutrition consultation.  Patient referred by Juliet Emanuel PA-C on 2024.    Patient with Co-morbidities of obesity includin/24/2024     6:38 PM   --   I have the following health issues associated with obesity Fatty Liver    Asthma   I have the following symptoms associated with obesity Depression    Irregular Menstral Cycle         Anthropometrics:  Started Wegovy a few months ago at wt of 310 lbs.     Wt Readings from Last 5 Encounters:   24 130.2 kg (287 lb)   24 130.2 kg (287 lb)   24 130.2 kg (287 lb)   24 131.5 kg (290 lb)   24 131.5 kg (290 lb)     Estimated body mass index is 51.66 kg/m  as calculated from the following:    Height as of this encounter: 1.588 m (5' 2.5\").    Weight as of this encounter: 130.2 kg (287 lb). (-0 lbs from last month, -23 lbs from start of medication)      Medications for Weight Loss:  Zepbound, naltrexone     NUTRITION HISTORY  Food allergies: None  Food intolerances: Dairy  Vitamin/Mineral Supplements: Once weekly vitamin D.     More recently has been focusing on 1500 doris/day diet, healthier food choices, bought a bike.   Waking around 7:45 am   Strong " cravings for ice cream   Lives with Mom, who cooks a couple meals per week, otherwise she is cooking her own.    Preferences: enjoys variety of foods     8/13/24 - she is planning to increase to 7.5 mg Zepbound. Working on not dining out, planning/prepping more at home. Stocked up on healthy snacks options - turkey sticks, pistachios, fast-breaks; Quest chips;   Less sweets. Went to the [employer] anticipated she was going to get candy there but ended up consciously deciding not to purchase.    9/30/24 - Stop nutritional tracking for a bit but restarted after weight loss slowed. Tracking in [facility], keeping intake to ~1500 doris/d. [place] like she got of track during week of her bday. Doing really well with protein intake. Utilizing protein shake and bar for breakfast replacement. May utilize another protein shake as snack. Doing well with lean protein and veggies at other meals.       Today - Just start 12.5 mg Zepbound.  First couple days after injection, feels nauseous after eating. Will lose coverage in Jan 2025. Continues to have challenge with snacking. Recently started on naltrexone and Wellbutrin.    Recent Diet Recall:  Breakfast: Protein bar or shake (Premier Protein)  Lunch: adult lunchable (13 gm pro, 300 doris) and veggie tray on days she works (14 gm protein)    Dinner: Taco dip and tortilla chips; roasted veggies and pre-grilled chicken.   Snacks:   Beverages: Crystal light/water - half gallon yeti daily; sugar-free body armor and lemonade once daily. diet soda once weekly.   Alcohol: None  Dining-out: cut [employer] down to once monthly.     Progress Towards Previous Goals:  1) 5335-2147 calories/day - Met intermittently, does identify doing better with calorie intake while tracking.   2) Continue to use protein shake/bar as breakfast replacement, and snack replacement.   3) Start vitamin D supplement - Not met lately, would like to re-incorporate.   4) Start a daily Women's multivitamin - not  "met  5) Walk at the gym once weekly. - Not as much walking with cold weather. Hasn't been to the gym lately. Identifies needing to schedule into week.     Physical Activity:  Bowling once weekly  Has a gym membership. Would like to start walking at the gym more.     Nutrition Prescription  Recommended energy/nutrient modification.    Nutrition Diagnosis  Obesity r/t long history of positive energy balance aeb BMI >30. - Ongoing    Nutrition Intervention  Materials/education provided on hypocaloric diet for weight loss. Discussed following calorie goal or 1253-6749 doris/day for an estimated 1-2 lb wt loss per week.   Reviewed progress towards previous goals. Praised pt on the positive change she has sustained.   Reviewed strategies for increasing physical activity, with emphasis on strengthening.   Reviewed nutrition-related labs and supplementation for repletion.   Patient demonstrates understanding.  Co-developed goals to work towards.   Provided pt with list of goals and resources below via [employer].     Expected Engagement: good  Follow-Up Plans: meal/snack planning     Nutrition Goals  1) Restart nutritional tracking. Aim for 3177-5990 calories/day   2) 30+ gm protein at each eating encounter (at least 75 gm protein daily)   3) Schedule gym session twice weekly.     Example Meal Plan:  Breakfast: breakfast bar + 2 string cheese  Lunch: lunchable/tuna pack + shake and mini veggie tray   Afternoon-snack: protein shake;   Dinner: palm-size meat/fish + 1/2 cup carb and 2 cup veggies     Quick/Easy Protein Sources:  Hard boiled eggs  Part-skim cheese sticks  Baby Bell cheese rounds  Low-fat/low-sugar Greek yogurt  Low-fat cottage cheese  Lean deli meat (chicken/turkey/ham)  Roasted chickpeas or lentils  Nuts   Turkey meat stick  Protein shake/bar  \"P3\" snack (cheese, nuts, deli meat)  [employer]i's \"Protein Bread\"   \"Egglife\" egg white wrap    Tuna/salmon can/packet     The Plate " Method  [email or web address]    Protein Sources   [email or web address]     Carbohydrates  [email or web address]     Mindful Eating  [email or web address]     Summary of Volumetrics Eating Plan  [email or web address]           Follow-Up:  2/10/25    Time spent with patient: 22 minutes.  Chantell Covington RD, LD        Again, thank you for allowing me to participate in the care of your patient.      Sincerely,    Chantell Covington RD

## 2024-12-16 ENCOUNTER — APPOINTMENT (OUTPATIENT)
Dept: BEHAVIORAL HEALTH | Age: 34
End: 2024-12-16

## 2024-12-16 ENCOUNTER — LAB SERVICES (OUTPATIENT)
Dept: LAB | Age: 34
End: 2024-12-16

## 2024-12-16 DIAGNOSIS — F41.1 GAD (GENERALIZED ANXIETY DISORDER): ICD-10-CM

## 2024-12-16 DIAGNOSIS — F31.9 BIPOLAR 1 DISORDER  (CMD): ICD-10-CM

## 2024-12-16 DIAGNOSIS — F31.9 BIPOLAR 1 DISORDER  (CMD): Primary | ICD-10-CM

## 2024-12-16 DIAGNOSIS — F42.2 MIXED OBSESSIONAL THOUGHTS AND ACTS: ICD-10-CM

## 2024-12-16 DIAGNOSIS — F90.0 ATTENTION DEFICIT HYPERACTIVITY DISORDER (ADHD), PREDOMINANTLY INATTENTIVE TYPE: ICD-10-CM

## 2024-12-16 DIAGNOSIS — Z79.899 MEDICATION MANAGEMENT: ICD-10-CM

## 2024-12-16 LAB
BASOPHILS # BLD: 0 K/MCL (ref 0–0.3)
BASOPHILS NFR BLD: 0 %
DEPRECATED RDW RBC: 48.6 FL (ref 39–50)
EOSINOPHIL # BLD: 0 K/MCL (ref 0–0.5)
EOSINOPHIL NFR BLD: 0 %
ERYTHROCYTE [DISTWIDTH] IN BLOOD: 20.3 % (ref 11–15)
HCT VFR BLD CALC: 31.9 % (ref 36–46.5)
HGB BLD-MCNC: 9 G/DL (ref 12–15.5)
IMM GRANULOCYTES # BLD AUTO: 0 K/MCL (ref 0–0.2)
IMM GRANULOCYTES # BLD: 0 %
LYMPHOCYTES # BLD: 2.2 K/MCL (ref 1–4.8)
LYMPHOCYTES NFR BLD: 23 %
MCH RBC QN AUTO: 19.3 PG (ref 26–34)
MCHC RBC AUTO-ENTMCNC: 28.2 G/DL (ref 32–36.5)
MCV RBC AUTO: 68.5 FL (ref 78–100)
MONOCYTES # BLD: 0.7 K/MCL (ref 0.3–0.9)
MONOCYTES NFR BLD: 7 %
NEUTROPHILS # BLD: 6.9 K/MCL (ref 1.8–7.7)
NEUTROPHILS NFR BLD: 70 %
NRBC BLD MANUAL-RTO: 0 /100 WBC
PLATELET # BLD AUTO: 342 K/MCL (ref 140–450)
RBC # BLD: 4.66 MIL/MCL (ref 4–5.2)
WBC # BLD: 9.9 K/MCL (ref 4.2–11)

## 2024-12-16 PROCEDURE — 36415 COLL VENOUS BLD VENIPUNCTURE: CPT | Performed by: INTERNAL MEDICINE

## 2024-12-16 PROCEDURE — 90834 PSYTX W PT 45 MINUTES: CPT | Performed by: COUNSELOR

## 2024-12-16 PROCEDURE — 85025 COMPLETE CBC W/AUTO DIFF WBC: CPT | Performed by: INTERNAL MEDICINE

## 2024-12-17 ENCOUNTER — TELEPHONE (OUTPATIENT)
Dept: BEHAVIORAL HEALTH | Age: 34
End: 2024-12-17

## 2024-12-30 ENCOUNTER — APPOINTMENT (OUTPATIENT)
Dept: LAB | Age: 34
End: 2024-12-30

## 2024-12-30 DIAGNOSIS — E55.9 VITAMIN D INSUFFICIENCY: ICD-10-CM

## 2024-12-30 DIAGNOSIS — D50.8 OTHER IRON DEFICIENCY ANEMIA: ICD-10-CM

## 2024-12-30 LAB
FERRITIN SERPL-MCNC: 9 NG/ML (ref 8–252)
IRON SATN MFR SERPL: 5 % (ref 15–45)
IRON SERPL-MCNC: 20 MCG/DL (ref 50–170)
TIBC SERPL-MCNC: 428 MCG/DL (ref 250–450)

## 2024-12-30 PROCEDURE — 83540 ASSAY OF IRON: CPT | Performed by: INTERNAL MEDICINE

## 2024-12-30 PROCEDURE — 83550 IRON BINDING TEST: CPT | Performed by: INTERNAL MEDICINE

## 2024-12-30 PROCEDURE — 82728 ASSAY OF FERRITIN: CPT | Performed by: INTERNAL MEDICINE

## 2024-12-30 PROCEDURE — 36415 COLL VENOUS BLD VENIPUNCTURE: CPT | Performed by: INTERNAL MEDICINE

## 2024-12-30 RX ORDER — ERGOCALCIFEROL 1.25 MG/1
1.25 CAPSULE, LIQUID FILLED ORAL
Qty: 4 CAPSULE | Refills: 3 | Status: SHIPPED | OUTPATIENT
Start: 2024-12-30

## 2025-01-03 ENCOUNTER — TELEPHONE (OUTPATIENT)
Dept: INTERNAL MEDICINE | Age: 35
End: 2025-01-03

## 2025-01-03 DIAGNOSIS — D50.8 OTHER IRON DEFICIENCY ANEMIA: Primary | ICD-10-CM

## 2025-01-06 ENCOUNTER — APPOINTMENT (OUTPATIENT)
Dept: BEHAVIORAL HEALTH | Age: 35
End: 2025-01-06

## 2025-01-07 ENCOUNTER — VIRTUAL VISIT (OUTPATIENT)
Dept: ENDOCRINOLOGY | Facility: CLINIC | Age: 35
End: 2025-01-07
Payer: COMMERCIAL

## 2025-01-07 VITALS — HEIGHT: 63 IN | WEIGHT: 285 LBS | BODY MASS INDEX: 50.5 KG/M2

## 2025-01-07 DIAGNOSIS — E66.01 CLASS 3 SEVERE OBESITY DUE TO EXCESS CALORIES WITH SERIOUS COMORBIDITY AND BODY MASS INDEX (BMI) OF 50.0 TO 59.9 IN ADULT (H): ICD-10-CM

## 2025-01-07 DIAGNOSIS — E66.813 CLASS 3 SEVERE OBESITY DUE TO EXCESS CALORIES WITH SERIOUS COMORBIDITY AND BODY MASS INDEX (BMI) OF 50.0 TO 59.9 IN ADULT (H): ICD-10-CM

## 2025-01-07 PROCEDURE — G2211 COMPLEX E/M VISIT ADD ON: HCPCS

## 2025-01-07 PROCEDURE — 98004 SYNCH AUDIO-VIDEO EST SF 10: CPT

## 2025-01-07 RX ORDER — NALTREXONE HYDROCHLORIDE 50 MG/1
TABLET, FILM COATED ORAL
Qty: 15 TABLET | Refills: 3 | Status: SHIPPED | OUTPATIENT
Start: 2025-01-07

## 2025-01-07 ASSESSMENT — PAIN SCALES - GENERAL: PAINLEVEL_OUTOF10: NO PAIN (0)

## 2025-01-07 NOTE — PROGRESS NOTES
Virtual Visit Details    Type of service:  Video Visit   Video Start Time:  1:05pm  Video End Time: 1:23pm    Originating Location (pt. Location): Home    Distant Location (provider location):  Off-site  Platform used for Video Visit: [employer]

## 2025-01-07 NOTE — NURSING NOTE
Current patient location: Patient declined to provide Pt states in [place], [place]    Is the patient currently in the state of [place]? YES    Visit mode:VIDEO    If the visit is dropped, the patient can be reconnected by:VIDEO VISIT: Text to cell phone:   Telephone Information:   Mobile [phone]       Will anyone else be joining the visit? NO  (If patient encounters technical issues they should call [phone]    Are changes needed to the allergy or medication list? No    Are refills needed on medications prescribed by this physician? NO    Rooming Documentation:  Questionnaire(s) completed    Reason for visit: RECHECK    Pt states no weight available within last week.    Brian RUSSO

## 2025-01-07 NOTE — PATIENT INSTRUCTIONS
"Thank you for allowing us the privilege of caring for you. We hope we provided you with the excellent service you deserve.   Please let us know if there is anything else we can do for you so that we can be sure you are completely satisfied with your care experience.    To ensure the quality of our services you may be receiving a patient satisfaction survey from an independent patient satisfaction monitoring company.    The greatest compliment you can give is a \"Likely to Recommend\"    Your visit was with Juliet Emanuel PA-C today.    Instructions per today's visit:     Jamil Upton, it was great to visit with you today.  Here is a review of our visit.  If our clinic scheduler is not able to reach you please call [phone] to schedule your next appointments.    Plan  Increase zepbound to 15mg- will send through [facility]. Alternatively can try wegovy to see if covered.   If not covered, will switch to naltrexone + 500mg metformin (low and slow titration due to diarrhea in the past)   Can also consider topiramate in the future, CJ will check with psychiatry for their thoughts on this   Goals we discussed today:   Going back to the gym regularly   Continuing to prioritize protein, goal of 30g per meal   Follow up KAUSHIK Salinas 1/27/25   Follow up with Juliet or other LORIE in 3 months   Dietician appointment with DARREL scheduled 2/10/25  Keep up the excellent work!       Information about Video Visits with [employer]: video visit information  _________________________________________________________________________________________________________________________________________________________  If you are asked by your clinic team to have your blood pressure checked:  [facility] do offer several locations for blood pressure checks. Please follow the below link to schedule an appointment. Scheduling an appointment at the pharmacy for a blood pressure check is now " preferred.    Appointment Plus ([email or web address])  _________________________________________________________________________________________________________________________________________________________  Important contact and scheduling information:  Please call our contact center at [phone] to schedule your next appointments.  To find a lab location near you, please call [phone].  For any nursing questions or concerns call Shanti Barrios LPN at [phone] or Cinthya Zapata RN at [phone]  Please call during clinic hours Monday through Friday 8:00a - 4:00p if you have questions or you can contact us via [email or web address] at anytime and we will reply during clinic hours.    Lab results will be communicated through My Chart or letter (if My Chart not used). Please call the clinic if you have not received communication after 1 week or if you have any questions.?  Clinic Fax: [phone]    _Interested in working with a health ?  Health coaches work with you to improve your overall health and wellbeing.  They look at the whole person, and may involve discussion of different areas of life, including, but not limited to the four pillars of health (sleep, exercise, nutrition, and stress management). Discuss with your care team if you would like to start working a health .  Health Coaching-3 Pack: Schedule by calling [phone]    $99 for three health coaching visits    Visits may be done in person or via phone    Coaching is a partnership between the  and the client; Coaches do not prescribe or diagnose    Coaching helps inspire the client to reach his/her personal goals   _________________________________________________________________________________________________________________________________________________________  __________  [facility] Get Moving Program  Our team of physical therapists is trained to help you understand and take control of your condition. They  will perform a thorough evaluation to determine your ability for activity and develop a customized plan to fit your goals and physical ability.  Scheduling: Unsure if the Get Moving program is right for you? Discuss the program with your medical provider or diabetes educator. You can also call us at [phone] to ask questions or schedule an appointment.   [facility] Get Moving Program  ____________________________________________________________________________________________________________________________________________________________________________        Thank you,   [facility] Comprehensive Weight Management Team

## 2025-01-07 NOTE — PROGRESS NOTES
Return Medical Weight Management Note     Ben Upton  MRN:  [id number]  :  1990  DASHAWN:  2025    Dear José Harris MD,    I had the pleasure of seeing your patient Ben Upton. She is a 34 year old female who I am continuing to see for treatment of obesity related to:        2024     6:38 PM   --   I have the following health issues associated with obesity Fatty Liver    Asthma   I have the following symptoms associated with obesity Depression    Irregular Menstral Cycle       Assessment & Plan   Problem List Items Addressed This Visit    None  Visit Diagnoses       Class 3 severe obesity due to excess calories with serious comorbidity and body mass index (BMI) of 50.0 to 59.9 in adult (H)        Relevant Medications    naltrexone (DEPADE/REVIA) 50 MG tablet    tirzepatide-Weight Management (ZEPBOUND) 15 MG/0.5ML prefilled pen           Plan  Increase zepbound to 15mg- will send through [facility]. Alternatively can try wegovy to see if covered.   If not covered, will switch to naltrexone + 500mg metformin (low and slow titration due to diarrhea in the past)   Start wellbutrin as prescribed by psychiatry, will likely have synergistic effect with naltrexone.   Can also consider topiramate in the future, CJ will check with psychiatry for their thoughts on this. Not sexually active, identifies as asexual.   Goals we discussed today:   Going back to the gym regularly   Continuing to prioritize protein, goal of 30g per meal   Follow up KAUSHIK Salinas 25   Follow up with Juliet or other LORIE in 3 months   Dietician appointment with RD scheduled 2/10/25  Keep up the excellent work!       INTERVAL HISTORY:  New mwm with me 2024   [employer] employee, started zepbound with [facility], switched to wegovy due to supply issues .     Overweight onset age 19, weighed 170lbs, over 4 years gained up to 260lbs, gradual continued weight gain to 310.  Highest weight in life was 310,  was at this weight a few months ago prior to starting wegovy.   Prior to starting wegovy was unable to lose weight in the past. Since starting wegovy has lost 20lbs.      Comorbidities associated with weight gain include fatty liver disease, asthma, prediabetes.  Additional health issues include borderline personality, bipolar 1, adhd, anxiety, ocd, ptsd. Works with a therapist every couple weeks. Also sees psychiatrist every 3 months. Feels mental health is overall pretty good.   Motivators for weight loss include to improve health, reduce risk for diabetes, manage fatty liver.      Regarding eating patterns and diet, she typically eats 3 meals a day. Craves sweets, loves ice cream. Brother works at [employer], eats at [employer] a lot (gets ice cream a lot there). Is able to get full. Struggles to stay full until next meal, will generally snack. Does struggle with portion control. Does experience food noise, though wonders if some of this is related to her OCD and obsessive tendencies, describes these symptoms have improved with medication for OCD. Also thinks the wegovy has helped curb food noise. Does experience emotional eating. Sometimes will experience a loss of control around eating, maybe a couple of days a week, particularly when watching tv in her room. Denies any history of purging to compensate for this.   Eats out/ gets take out 2-3 a week, though 2 of these times are generally just getting ice cream at [employer] (go-to is a pint of ice cream that is chocolate based). Drinks water with crystal lite or plain water. Sometimes drinks prime. Diet pop occasionally, 1-2 days a week. No ETOH.      Regarding activity, works at [employer], is on her feet all day. Loves this job. Just bought a bike, hopes to use this more with the better weather. Loves going bowling 2x a week, hasn't been able to go recently due to not having anyone to go with. From August- May is in a bowling league.   -switched back to  zepbound as she had seen improved response     Today in visit 1/7/25  Frustrated with [employer] insurance changes.   Tried sending zepbound through [facility] to see if covered back in 2024, reports it was not covered.   Never started naltrexone as prescribed through [facility], due to confusion with the pharmacy.     Was prescribed wellbutrin through psychiatry, planning on starting this tonight.     Will try sending zepbound through [facility].   Otherwise will start naltrexone and wellbutrin combo.     She is interested in revisiting metformin with a low and slow titration- recalls having worsened diarrhea with this med but wonders if it was with increasing dose too quickly .  Wt Readings from Last 5 Encounters:   01/07/25 129.3 kg (285 lb)   12/02/24 130.2 kg (287 lb)   11/11/24 130.2 kg (287 lb)   09/30/24 130.2 kg (287 lb)   08/09/24 131.5 kg (290 lb)       Anti-obesity medication history    Current:   Zepbound 12.5mg- has 4 doses left. Feeling like she could increase it. No nausea or vomiting. No heart burn or constipation. Very frustrated that she's losing access to this med with the new year.       Past/Failed/contraindicated:   Metformin- diarrhea, intolerable.         Recent diet changes: working with RD regularly, working on getting 30g protein per meal. Some issues with sweet cravings.     Recent exercise/activity changes: working a lot lately, walking around at the mall a lot as well. Hopes to start going to the gym at least 3x a week.     Vitamins/Labs: low vitamin d in November 2024- started taking 50,000 once weekly. Low iron, starting iron supplement. PCP is monitoring these.     Pregnancy: not sexually active -  is asexual.     CURRENT WEIGHT:   285 lbs 0 oz    Initial Weight (lbs): 290 lbs  Last Visits Weight: 130.2 kg (287 lb)  Cumulative weight loss (lbs): 5  Weight Loss Percentage: 1.72%        1/7/2025    12:45 PM   Changes and Difficulties   I have made the following changes to my diet since my last  visit: Less eating out and more protein   With regards to my diet, I am still struggling with: Snacking and sweets   I have made the following changes to my activity/exercise since my last visit: Walking more   With regards to my activity/exercise, I am still struggling with: Motivation             MEDICATIONS:   Current Outpatient Medications   Medication Sig Dispense Refill    naltrexone (DEPADE/REVIA) 50 MG tablet Take 1/4 tablet by mouth once daily 1 hour before strongest cravings. Can increase to 1/2 tablet once daily after 7 days if tolerating. 15 tablet 3    tirzepatide-Weight Management (ZEPBOUND) 15 MG/0.5ML prefilled pen Inject 0.5 mLs (15 mg) subcutaneously every 7 days. 2 mL 3    acetaminophen-caffeine (EXCEDRIN TENSION HEADACHE) 500-65 MG TABS Take 2 tablets by mouth every 6 hours as needed for mild pain (Patient not taking: Reported on 11/11/2024)      albuterol (PROAIR HFA;PROVENTIL HFA;VENTOLIN HFA) 90 mcg/actuation inhaler [ALBUTEROL (PROAIR HFA;PROVENTIL HFA;VENTOLIN HFA) 90 MCG/ACTUATION INHALER] Inhale 2 puffs every 6 (six) hours as needed for wheezing.      albuterol (PROVENTIL) (2.5 MG/3ML) 0.083% neb solution Take 2.5 mg by nebulization every 6 hours as needed for shortness of breath, wheezing or cough      atomoxetine (STRATTERA) 80 MG capsule [ATOMOXETINE (STRATTERA) 80 MG CAPSULE] Take 80 mg by mouth daily.      clomiPRAMINE (ANAFRANIL) 25 MG capsule [CLOMIPRAMINE (ANAFRANIL) 25 MG CAPSULE] Take 25 mg by mouth daily.      cloZAPine (CLOZARIL) 100 MG tablet [CLOZAPINE (CLOZARIL) 100 MG TABLET] Take 300 mg by mouth at bedtime. Monthly wbc      etonogestrel (NEXPLANON) 68 MG IMPL 68 mg by Subdermal route once      ferrous sulfate (FEROSUL) 325 (65 Fe) MG tablet Take 1 tablet by mouth daily      hydrOXYzine osmar (VISTARIL) 25 MG capsule Take 25-50 mg by mouth      ibuprofen (ADVIL,MOTRIN) 200 MG tablet [IBUPROFEN (ADVIL,MOTRIN) 200 MG TABLET] Take 200 mg by mouth every 6 (six) hours as needed  "for pain.      propranoloL (INDERAL) 20 MG tablet [PROPRANOLOL (INDERAL) 20 MG TABLET] Take 20 mg by mouth 2 (two) times a day.      tirzepatide-Weight Management (ZEPBOUND) 12.5 MG/0.5ML prefilled pen Inject 0.5 mLs (12.5 mg) subcutaneously every 7 days. 6 mL 0    traZODone (DESYREL) 50 MG tablet Take 50 mg by mouth nightly as needed for sleep      Vitamin D, Ergocalciferol, 36330 units CAPS Take 50,000 Units by mouth.             1/7/2025    12:45 PM   Weight Loss Medication History Reviewed With Patient   Which weight loss medications are you currently taking on a regular basis? Naltrexone    Bupropion   If you are not taking a weight loss medication that was prescribed to you, please indicate why: Other   Are you having any side effects from the weight loss medication that we have prescribed you? No                No data to display                  PHYSICAL EXAM:  Objective    Ht 1.588 m (5' 2.5\")   Wt 129.3 kg (285 lb)   BMI 51.30 kg/m      Vitals - Patient Reported  Pain Score: No Pain (0)      Vitals:  No vitals were obtained today due to virtual visit.    GENERAL: alert and no distress  EYES: Eyes grossly normal to inspection.  No discharge or erythema, or obvious scleral/conjunctival abnormalities.  RESP: No audible wheeze, cough, or visible cyanosis.    SKIN: Visible skin clear. No significant rash, abnormal pigmentation or lesions.  NEURO: Cranial nerves grossly intact.  Mentation and speech appropriate for age.  PSYCH: Appropriate affect, tone, and pace of words        Sincerely,    Juliet Emanuel PA-C      19 minutes spent by me on the date of the encounter doing chart review, history and exam, documentation and further activities per the note    The longitudinal plan of care for the diagnosis(es)/condition(s) as documented were addressed during this visit. Due to the added complexity in care, I will continue to support CJ in the subsequent management and with ongoing continuity of care.   "

## 2025-01-07 NOTE — LETTER
2025       RE: Ben Upton  [address] [facility] [address] [facility] [address]     Dear Colleague,    Thank you for referring your patient, Ben Upton, to the [facility]NAG[facility] [place] at [facility]. Please see a copy of my visit note below.      Return Medical Weight Management Note     Ben Upton  MRN:  [id number]  :  1990  DASHAWN:  2025    Dear José Harris MD,    I had the pleasure of seeing your patient Ben Upton. She is a 34 year old female who I am continuing to see for treatment of obesity related to:        2024     6:38 PM   --   I have the following health issues associated with obesity Fatty Liver    Asthma   I have the following symptoms associated with obesity Depression    Irregular Menstral Cycle       Assessment & Plan  Problem List Items Addressed This Visit    None  Visit Diagnoses       Class 3 severe obesity due to excess calories with serious comorbidity and body mass index (BMI) of 50.0 to 59.9 in adult (H)        Relevant Medications    naltrexone (DEPADE/REVIA) 50 MG tablet    tirzepatide-Weight Management (ZEPBOUND) 15 MG/0.5ML prefilled pen           Plan  Increase zepbound to 15mg- will send through [facility]. Alternatively can try wegovy to see if covered.   If not covered, will switch to naltrexone + 500mg metformin (low and slow titration due to diarrhea in the past)   Start wellbutrin as prescribed by psychiatry, will likely have synergistic effect with naltrexone.   Can also consider topiramate in the future, CJ will check with psychiatry for their thoughts on this. Not sexually active, identifies as asexual.   Goals we discussed today:   Going back to the gym regularly   Continuing to prioritize protein, goal of 30g per meal   Follow up KAUSHIK Salinas 25   Follow up with Juliet or other LORIE in 3 months   Dietician appointment with DARREL vail  2/10/25  Keep up the excellent work!       INTERVAL HISTORY:  New mwm with me 6/27/2024   [employer] employee, started zepbound with MTM Floresita Salinas, switched to wegovy due to supply issues .     Overweight onset age 19, weighed 170lbs, over 4 years gained up to 260lbs, gradual continued weight gain to 310.  Highest weight in life was 310, was at this weight a few months ago prior to starting wegovy.   Prior to starting wegovy was unable to lose weight in the past. Since starting wegovy has lost 20lbs.      Comorbidities associated with weight gain include fatty liver disease, asthma, prediabetes.  Additional health issues include borderline personality, bipolar 1, adhd, anxiety, ocd, ptsd. Works with a therapist every couple weeks. Also sees psychiatrist every 3 months. Feels mental health is overall pretty good.   Motivators for weight loss include to improve health, reduce risk for diabetes, manage fatty liver.      Regarding eating patterns and diet, she typically eats 3 meals a day. Craves sweets, loves ice cream. Brother works at [employer], eats at [employer] a lot (gets ice cream a lot there). Is able to get full. Struggles to stay full until next meal, will generally snack. Does struggle with portion control. Does experience food noise, though wonders if some of this is related to her OCD and obsessive tendencies, describes these symptoms have improved with medication for OCD. Also thinks the wegovy has helped curb food noise. Does experience emotional eating. Sometimes will experience a loss of control around eating, maybe a couple of days a week, particularly when watching tv in her room. Denies any history of purging to compensate for this.   Eats out/ gets take out 2-3 a week, though 2 of these times are generally just getting ice cream at [employer] (go-to is a pint of ice cream that is chocolate based). Drinks water with crystal lite or plain water. Sometimes drinks prime. Diet pop occasionally, 1-2 days a week. No  ETOH.      Regarding activity, works at [employer], is on her feet all day. Loves this job. Just bought a bike, hopes to use this more with the better weather. Loves going bowling 2x a week, hasn't been able to go recently due to not having anyone to go with. From August- May is in a bowling league.   -switched back to zepbound as she had seen improved response     Today in visit 1/7/25  Frustrated with [employer] insurance changes.   Tried sending zepbound through [facility] to see if covered back in 2024, reports it was not covered.   Never started naltrexone as prescribed through Floresita Salinas, due to confusion with the pharmacy.     Was prescribed wellbutrin through psychiatry, planning on starting this tonight.     Will [facility].   Otherwise will start naltrexone and wellbutrin combo.     She is interested in revisiting metformin with a low and slow titration- recalls having worsened diarrhea with this med but wonders if it was with increasing dose too quickly .  Wt Readings from Last 5 Encounters:   01/07/25 129.3 kg (285 lb)   12/02/24 130.2 kg (287 lb)   11/11/24 130.2 kg (287 lb)   09/30/24 130.2 kg (287 lb)   08/09/24 131.5 kg (290 lb)       Anti-obesity medication history    Current:   Zepbound 12.5mg- has 4 doses left. Feeling like she could increase it. No nausea or vomiting. No heart burn or constipation. Very frustrated that she's losing access to this med with the new year.       Past/Failed/contraindicated:   Metformin- diarrhea, intolerable.         Recent diet changes: working with RD regularly, working on getting 30g protein per meal. Some issues with sweet cravings.     Recent exercise/activity changes: working a lot lately, walking around at the mall a lot as well. Hopes to start going to the gym at least 3x a week.     Vitamins/Labs: low vitamin d in November 2024- started taking 50,000 once weekly. Low iron, starting iron supplement. PCP is monitoring these.      Pregnancy: not sexually active -  is asexual.     CURRENT WEIGHT:   285 lbs 0 oz    Initial Weight (lbs): 290 lbs  Last Visits Weight: 130.2 kg (287 lb)  Cumulative weight loss (lbs): 5  Weight Loss Percentage: 1.72%        1/7/2025    12:45 PM   Changes and Difficulties   I have made the following changes to my diet since my last visit: Less eating out and more protein   With regards to my diet, I am still struggling with: Snacking and sweets   I have made the following changes to my activity/exercise since my last visit: Walking more   With regards to my activity/exercise, I am still struggling with: Motivation             MEDICATIONS:   Current Outpatient Medications   Medication Sig Dispense Refill     naltrexone (DEPADE/REVIA) 50 MG tablet Take 1/4 tablet by mouth once daily 1 hour before strongest cravings. Can increase to 1/2 tablet once daily after 7 days if tolerating. 15 tablet 3     tirzepatide-Weight Management (ZEPBOUND) 15 MG/0.5ML prefilled pen Inject 0.5 mLs (15 mg) subcutaneously every 7 days. 2 mL 3     acetaminophen-caffeine (EXCEDRIN TENSION HEADACHE) 500-65 MG TABS Take 2 tablets by mouth every 6 hours as needed for mild pain (Patient not taking: Reported on 11/11/2024)       albuterol (PROAIR HFA;PROVENTIL HFA;VENTOLIN HFA) 90 mcg/actuation inhaler [ALBUTEROL (PROAIR HFA;PROVENTIL HFA;VENTOLIN HFA) 90 MCG/ACTUATION INHALER] Inhale 2 puffs every 6 (six) hours as needed for wheezing.       albuterol (PROVENTIL) (2.5 MG/3ML) 0.083% neb solution Take 2.5 mg by nebulization every 6 hours as needed for shortness of breath, wheezing or cough       atomoxetine (STRATTERA) 80 MG capsule [ATOMOXETINE (STRATTERA) 80 MG CAPSULE] Take 80 mg by mouth daily.       clomiPRAMINE (ANAFRANIL) 25 MG capsule [CLOMIPRAMINE (ANAFRANIL) 25 MG CAPSULE] Take 25 mg by mouth daily.       cloZAPine (CLOZARIL) 100 MG tablet [CLOZAPINE (CLOZARIL) 100 MG TABLET] Take 300 mg by mouth at bedtime. Monthly wbc        "etonogestrel (NEXPLANON) 68 MG IMPL 68 mg by Subdermal route once       ferrous sulfate (FEROSUL) 325 (65 Fe) MG tablet Take 1 tablet by mouth daily       hydrOXYzine osmar (VISTARIL) 25 MG capsule Take 25-50 mg by mouth       ibuprofen (ADVIL,MOTRIN) 200 MG tablet [IBUPROFEN (ADVIL,MOTRIN) 200 MG TABLET] Take 200 mg by mouth every 6 (six) hours as needed for pain.       propranoloL (INDERAL) 20 MG tablet [PROPRANOLOL (INDERAL) 20 MG TABLET] Take 20 mg by mouth 2 (two) times a day.       tirzepatide-Weight Management (ZEPBOUND) 12.5 MG/0.5ML prefilled pen Inject 0.5 mLs (12.5 mg) subcutaneously every 7 days. 6 mL 0     traZODone (DESYREL) 50 MG tablet Take 50 mg by mouth nightly as needed for sleep       Vitamin D, Ergocalciferol, 24503 units CAPS Take 50,000 Units by mouth.             1/7/2025    12:45 PM   Weight Loss Medication History Reviewed With Patient   Which weight loss medications are you currently taking on a regular basis? Naltrexone    Bupropion   If you are not taking a weight loss medication that was prescribed to you, please indicate why: Other   Are you having any side effects from the weight loss medication that we have prescribed you? No                No data to display                  PHYSICAL EXAM:  Objective   Ht 1.588 m (5' 2.5\")   Wt 129.3 kg (285 lb)   BMI 51.30 kg/m      Vitals - Patient Reported  Pain Score: No Pain (0)      Vitals:  No vitals were obtained today due to virtual visit.    GENERAL: alert and no distress  EYES: Eyes grossly normal to inspection.  No discharge or erythema, or obvious scleral/conjunctival abnormalities.  RESP: No audible wheeze, cough, or visible cyanosis.    SKIN: Visible skin clear. No significant rash, abnormal pigmentation or lesions.  NEURO: Cranial nerves grossly intact.  Mentation and speech appropriate for age.  PSYCH: Appropriate affect, tone, and pace of words        Sincerely,    Juliet Emanuel PA-C      19 minutes spent by me on the date of " the encounter doing chart review, history and exam, documentation and further activities per the note    The longitudinal plan of care for the diagnosis(es)/condition(s) as documented were addressed during this visit. Due to the added complexity in care, I will continue to support CJ in the subsequent management and with ongoing continuity of care.     Virtual Visit Details    Type of service:  Video Visit   Video Start Time:  1:05pm  Video End Time: 1:23pm    Originating Location (pt. Location): Home    Distant Location (provider location):  Off-site  Platform used for Video Visit: AmWell      Again, thank you for allowing me to participate in the care of your patient.      Sincerely,    Juliet Emanuel PA-C

## 2025-01-08 ENCOUNTER — TELEPHONE (OUTPATIENT)
Dept: ENDOCRINOLOGY | Facility: CLINIC | Age: 35
End: 2025-01-08
Payer: COMMERCIAL

## 2025-01-08 NOTE — TELEPHONE ENCOUNTER
Left Voicemail (1st Attempt) and Sent Mychart (1st Attempt) for the patient to call back...    ...reschedule the following:    Appointment type: Return Med WT MGMT Nutrition  Appointment mode: Virtual Visit  Previously scheduled with: Chantell Covington  Reschedule with: Nazanin Collins or Lela San  Date: 2/10/25    ...schedule the following:    Appointment type: Return Med WT MGMT  Appointment mode: In person Virtual Visit  Scheduled with: Juliet Emanuel  Date: 4/7/25    Specialty phone number: [phone]    Additonal Notes:

## 2025-01-10 ENCOUNTER — TELEPHONE (OUTPATIENT)
Dept: ENDOCRINOLOGY | Facility: CLINIC | Age: 35
End: 2025-01-10
Payer: COMMERCIAL

## 2025-01-13 ENCOUNTER — TELEPHONE (OUTPATIENT)
Dept: GASTROENTEROLOGY | Age: 35
End: 2025-01-13

## 2025-01-13 ENCOUNTER — APPOINTMENT (OUTPATIENT)
Dept: BEHAVIORAL HEALTH | Age: 35
End: 2025-01-13

## 2025-01-20 ENCOUNTER — APPOINTMENT (OUTPATIENT)
Dept: BEHAVIORAL HEALTH | Age: 35
End: 2025-01-20

## 2025-01-20 DIAGNOSIS — E66.813 CLASS 3 SEVERE OBESITY DUE TO EXCESS CALORIES WITH SERIOUS COMORBIDITY AND BODY MASS INDEX (BMI) OF 50.0 TO 59.9 IN ADULT (H): Primary | ICD-10-CM

## 2025-01-20 DIAGNOSIS — K76.0 FATTY LIVER: ICD-10-CM

## 2025-01-20 DIAGNOSIS — F31.9 BIPOLAR 1 DISORDER  (CMD): Primary | ICD-10-CM

## 2025-01-20 DIAGNOSIS — F90.0 ATTENTION DEFICIT HYPERACTIVITY DISORDER (ADHD), PREDOMINANTLY INATTENTIVE TYPE: ICD-10-CM

## 2025-01-20 DIAGNOSIS — F42.2 MIXED OBSESSIONAL THOUGHTS AND ACTS: ICD-10-CM

## 2025-01-20 DIAGNOSIS — E66.01 CLASS 3 SEVERE OBESITY DUE TO EXCESS CALORIES WITH SERIOUS COMORBIDITY AND BODY MASS INDEX (BMI) OF 50.0 TO 59.9 IN ADULT (H): Primary | ICD-10-CM

## 2025-01-20 DIAGNOSIS — F41.1 GAD (GENERALIZED ANXIETY DISORDER): ICD-10-CM

## 2025-01-20 PROCEDURE — 99214 OFFICE O/P EST MOD 30 MIN: CPT

## 2025-01-20 RX ORDER — NALTREXONE HYDROCHLORIDE 50 MG/1
TABLET, FILM COATED ORAL
Qty: 30 TABLET | Refills: 3 | Status: SHIPPED | OUTPATIENT
Start: 2025-01-20

## 2025-01-20 RX ORDER — METFORMIN HYDROCHLORIDE 500 MG/1
TABLET, EXTENDED RELEASE ORAL
Qty: 60 TABLET | Refills: 3 | Status: SHIPPED | OUTPATIENT
Start: 2025-01-20

## 2025-01-27 ENCOUNTER — APPOINTMENT (OUTPATIENT)
Dept: BEHAVIORAL HEALTH | Age: 35
End: 2025-01-27

## 2025-01-27 ENCOUNTER — VIRTUAL VISIT (OUTPATIENT)
Dept: PHARMACY | Facility: CLINIC | Age: 35
End: 2025-01-27
Attending: NURSE PRACTITIONER
Payer: COMMERCIAL

## 2025-01-27 DIAGNOSIS — E66.01 CLASS 3 SEVERE OBESITY DUE TO EXCESS CALORIES WITH SERIOUS COMORBIDITY AND BODY MASS INDEX (BMI) OF 50.0 TO 59.9 IN ADULT (H): Primary | ICD-10-CM

## 2025-01-27 DIAGNOSIS — R73.03 PREDIABETES: ICD-10-CM

## 2025-01-27 DIAGNOSIS — E66.813 CLASS 3 SEVERE OBESITY DUE TO EXCESS CALORIES WITH SERIOUS COMORBIDITY AND BODY MASS INDEX (BMI) OF 50.0 TO 59.9 IN ADULT (H): Primary | ICD-10-CM

## 2025-01-27 DIAGNOSIS — E55.9 VITAMIN D INSUFFICIENCY: ICD-10-CM

## 2025-01-27 DIAGNOSIS — K76.0 FATTY LIVER: ICD-10-CM

## 2025-01-27 RX ORDER — NALTREXONE HYDROCHLORIDE 50 MG/1
TABLET, FILM COATED ORAL
Qty: 30 TABLET | Refills: 3 | Status: SHIPPED | OUTPATIENT
Start: 2025-01-27

## 2025-01-27 RX ORDER — BUPROPION HYDROCHLORIDE 150 MG/1
1 TABLET ORAL DAILY
COMMUNITY
Start: 2024-11-18

## 2025-01-27 RX ORDER — METFORMIN HYDROCHLORIDE 500 MG/1
TABLET, EXTENDED RELEASE ORAL
Qty: 60 TABLET | Refills: 3 | Status: SHIPPED | OUTPATIENT
Start: 2025-01-27

## 2025-01-27 ASSESSMENT — PAIN SCALES - GENERAL: PAINLEVEL_OUTOF10: NO PAIN (0)

## 2025-01-27 NOTE — NURSING NOTE
Current patient location: Patient declined to provide     Is the patient currently in the state of [place]? NO    Visit mode: TELEPHONE    If the visit is dropped, the patient can be reconnected by:TELEPHONE VISIT: Phone number:   Telephone Information:   Mobile [phone]       Will anyone else be joining the visit? NO  (If patient encounters technical issues they should call [phone] :[id number])    Are changes needed to the allergy or medication list? No    Are refills needed on medications prescribed by this physician? Discuss with provider    Rooming Documentation:  Questionnaire(s) not pre-assigned    Reason for visit: Medication Therapy Management    Brian RUSSO

## 2025-01-27 NOTE — PATIENT INSTRUCTIONS
"Recommendations from today's MTM visit:                                                      Continue Zepbound 12.5 mg once weekly for 2 more weeks, then reduce to 5 mg once weekly for 2 weeks, then stop.     When you reduce zepbound to the 5 mg dose, you can start the following:   Metformin  mg - take 1 tablet by mouth with dinner for 1 week, then increase to 2 tablets with dinner  Naltrexone - take 1/2 tablet with dinner for 1 week, then increase to 1 full tablet   Start bupropion 150 mg once daily in the morning     Please ask your primary care provider to send a prescription for iron to [facility] to include in your blister packs to help with adherence.     Please ask your psychiatrist to send bupropion to [facility] to include in blister packs to help with adherence.     Follow-up: 2 months     It was great speaking with you today.  I value your experience and would be very thankful for your time in providing feedback in our clinic survey. In the next few days, you may receive an email or text message from [employer] with a link to a survey related to your  clinical pharmacist.\"     To schedule another MTM appointment, please call the clinic directly or you may call the MTM scheduling line at [phone].    My Clinical Pharmacist's contact information:                                                      Please feel free to contact me with any questions or concerns you have.      Floresita Salinas, PharmD, [facility]  Medication Therapy Management Pharmacist      "

## 2025-01-27 NOTE — PROGRESS NOTES
Medication Therapy Management (MTM) Encounter    ASSESSMENT:                            Medication Adherence/Access: called [facility]. Vitamin D is in blister packs. They don't have prescriptions for iron and bupropion.     Weight Management   /NAFLD/pre-diabetes:  Weight loss is not progressing and has to stop Zepbound soon. Discussed how to take metformin and naltrexone. Okay to start when she decreases zepbound to 5 mg/week. Would benefit from adding oral medications to blister packs to help with adherence.     Iron deficiency anemia:   Hemoglobin and iron levels are worsening, not improving. Would benefit from including iron in blister packs.     Vitamin D Insufficiency:  Encouraged adherence since vitamin D level has decreased from September to November.     PLAN:                            Continue Zepbound 12.5 mg once weekly for 2 more weeks, then reduce to 5 mg once weekly for 2 weeks, then stop.   When you reduce zepbound to the 5 mg dose, you can start the following:   Metformin  mg - take 1 tablet by mouth with dinner for 1 week, then increase to 2 tablets with dinner  Naltrexone - take 1/2 tablet with dinner for 1 week, then increase to 1 full tablet   Start bupropion 150 mg once daily in the morning   Please ask your primary care provider to send a prescription for iron to [facility] to include in your blister packs to help with adherence.   Please ask your psychiatrist to send bupropion to [facility] pharmacy to include in blister packs to help with adherence.     Follow-up: 2 months     SUBJECTIVE/OBJECTIVE:                          DERICK Upton is a 34 year old female seen for a follow-up visit.       Reason for visit: weight management follow-up.    Allergies/ADRs: Reviewed in chart  Past Medical History: Reviewed in chart  Tobacco: She reports that she has never smoked. She has never used smokeless tobacco.  Alcohol: none  [employer] insurance- Lives in [place]  Medication Adherence/Access:  "GLP-1 RA not covered next year on [place] insurance. Gets medications blister-packed at [facility]. Struggles with adherence to medications that aren't in the blister packs.     Preferred pharmacy:   [facility] [address], [place] [address]     Weight Management   /NAFLD/pre-diabetes:  Zepbound 12.5 mg once weekly. Has two doses left of 12.5 mg/week and two doses of 5 mg/week.   Bupropion 150 mg once daily - started for mood and to help with cravings. Hasn't started this yet. Reports she lost the initial prescription.   Metformin  mg daily - hasn't started yet  Naltrexone 25 mg daily - hasn't started yet     Has primary care provider in [place]. Following with Juliet Emanuel PA-C for weight management clinic. No side effects. Has been trying not to eat out as much. Currently eating out a few days per week. She's been grocery shopping for healthier food options. Trying to eat more mediterranean diet. Has been curbing sweets cravings with applesauce or high protein hot chocolate. Struggling with snacking still.     Tried/Failed/Contraindicated Medications:   No hx pancreatitis/thyroid cancer  On clozapine which is associated with a significant amount of weight gain.   Metformin: diarrhea. Willing to re-trial.   Wegovy: tolerated okay but zepbound works better     Initial Consult Weight: 309.5 lbs  A1c: 5.3% - 12/29/23 per care everywhere     Cumulative Weight Loss: -24.5 lb, -8% from baseline    Wt Readings from Last 4 Encounters:   01/07/25 285 lb (129.3 kg)   12/02/24 287 lb (130.2 kg)   11/11/24 287 lb (130.2 kg)   09/30/24 287 lb (130.2 kg)     Estimated body mass index is 51.3 kg/m  as calculated from the following:    Height as of 1/7/25: 5' 2.5\" (1.588 m).    Weight as of 1/7/25: 285 lb (129.3 kg).      Iron deficiency anemia:   Ferrous sulfate 325 mg once daily   She forgets this sometimes. It doesn't get added to blister packs. Thinks adherence has improved recently. No side " effects.       Vitamin D Insufficiency:  Vitamin D 50,000 units once weekly   Reports this was recently added to pill packs to help with adherence. Was previously taking vitamin D gummies.     Today's Vitals: There were no vitals taken for this visit.  ----------------    I spent 21 minutes with this patient today. All changes were made via collaborative practice agreement with Juliet Emanuel PA-C.     A summary of these recommendations was sent via [facility].      Telemedicine Visit Details  The patient's medications can be safely assessed via a telemedicine encounter.  Type of service:  Telephone visit  Originating Location (pt. Location): Home    Distant Location (provider location):  Off-site  Start Time: 9:03 AM  End Time: 9:24 AM     Medication Therapy Recommendations  No medication therapy recommendations to display

## 2025-02-03 ENCOUNTER — APPOINTMENT (OUTPATIENT)
Dept: BEHAVIORAL HEALTH | Age: 35
End: 2025-02-03

## 2025-02-03 DIAGNOSIS — F42.2 MIXED OBSESSIONAL THOUGHTS AND ACTS: ICD-10-CM

## 2025-02-03 DIAGNOSIS — F90.0 ATTENTION DEFICIT HYPERACTIVITY DISORDER (ADHD), PREDOMINANTLY INATTENTIVE TYPE: ICD-10-CM

## 2025-02-03 DIAGNOSIS — F41.1 GAD (GENERALIZED ANXIETY DISORDER): ICD-10-CM

## 2025-02-03 DIAGNOSIS — F31.9 BIPOLAR 1 DISORDER  (CMD): Primary | ICD-10-CM

## 2025-02-03 PROCEDURE — 90832 PSYTX W PT 30 MINUTES: CPT | Performed by: COUNSELOR

## 2025-02-10 NOTE — PROGRESS NOTES
"Video-Visit Details    Type of service:  Video Visit    Video Start Time: 8:49 am  Video End Time: 9:08 am    Originating Location (pt. Location): Home    Distant Location (provider location):  Offsite (providers home) [facility] [place]     Platform used for Video Visit: Doubloon      Weight Management Nutrition Consultation    Ben Upton is a 34 year old female presents today for return weight management nutrition consultation.  Patient referred by Juliet Emanuel PA-C on 2024.    Patient with Co-morbidities of obesity includin/24/2024     6:38 PM   --   I have the following health issues associated with obesity Fatty Liver    Asthma   I have the following symptoms associated with obesity Depression    Irregular Menstral Cycle         Anthropometrics:  Started Wegovy at a weight of 310 lbs.     Estimated body mass index is 53.04 kg/m  as calculated from the following:    Height as of this encounter: 1.575 m (5' 2\").    Weight as of this encounter: 131.5 kg (290 lb).     Current weight: 290 lbs      Medications for Weight Loss:  Zepbound - Has to stop soon due to insurance  Will be continuing naltrexone and adding metformin/bupropion     NUTRITION HISTORY  Food allergies: None  Food intolerances: Dairy  Vitamin/Mineral Supplements: Once weekly vitamin D.   - Vit D insufficiency and iron def anemia, recommended to add iron to blister pack per Floresita Salinas, Pharm D. Has not added Vit D to blister pack and has not been taking recently. Is open to starting to take     More recently has been focusing on 1500 doris/day diet, healthier food choices, bought a bike.   Waking around 7:45 am   Strong cravings for ice cream   Lives with Mom, who cooks a couple meals per week, otherwise she is cooking her own.    Preferences: enjoys variety of foods     24 - she is planning to increase to 7.5 mg Zepbound. Working on not dining out, planning/prepping more at home. " Stocked up on healthy snacks options - turkey sticks, pistachios, fast-breaks; Quest chips;   Less sweets. Went to the [employer], anticipated she was going to get candy there but ended up consciously deciding not to purchase.    9/30/24 - Stop nutritional tracking for a bit but restarted after weight loss slowed. Tracking in [facility], keeping intake to ~1500 doris/d. [place] like she got of track during week of her bday. Doing really well with protein intake. Utilizing protein shake and bar for breakfast replacement. May utilize another protein shake as snack. Doing well with lean protein and veggies at other meals.       Dec 2024 - Just start 12.5 mg Zepbound.  First couple days after injection, feels nauseous after eating. Will lose coverage in Jan 2025. Continues to have challenge with snacking. Recently started on naltrexone and Wellbutrin.    Recent Diet Recall:  Breakfast: Protein bar or shake (Premier Protein)  Lunch: adult lunchable (13 gm pro, 300 doris) and veggie tray on days she works (14 gm protein)    Dinner: Taco dip and tortilla chips; roasted veggies and pre-grilled chicken.   Snacks:   Beverages: Crystal light/water - half gallon yeti daily; sugar-free body armor and lemonade once daily. diet soda once weekly.   Alcohol: None  Dining-out: cut Culvers down to once monthly.     Physical Activity:  Bowling once weekly  Has a gym membership. Would like to start walking at the gym more.     Feb 2025:  Had to stop Zepbound due to insurance. Lost shot was 2 days ago. Met with Floresita Salinas, Pharm D, and developed a plan. (See medication section for plan)    Not tracking intake currently but wants to again.  Eating out more recently. Meal examples: burger, chicken sandwich  Found a new protein bar for breakfast - fitcrunch, really enjoying them. Low sugar.   Lunch - protein shake paired with something   Dinner meal is hardest - eating at work.    Getting a new job in 2 weeks - [employer].     Hydration:  about 1/2 gallon of water/day, occ sugar-free lemonade,  sugar-free gatorade, diet soda or Wyatt cruz but rarely    Progress Towards Previous Goals:  1) Restart nutritional tracking. Aim for 9599-9618 calories/day - Not tracking currently but wants to get back into it.   2) 30+ gm protein at each eating encounter (at least 75 gm protein daily) - Was going well then  fell off the bandwagon  per pt  3) Schedule gym session twice weekly. - Not yet, has been working a lot     Nutrition Prescription  Recommended energy/nutrient modification.    Nutrition Diagnosis  Obesity r/t long history of positive energy balance aeb BMI >30. - Ongoing    Nutrition Intervention  Materials/education provided on hypocaloric diet for weight loss. Discussed following calorie goal or 8014-2109 doris/day for an estimated 1-2 lb wt loss per week.   Reviewed progress towards previous goals. Praised pt on the positive change she has sustained.   Reviewed strategies for increasing physical activity, with emphasis on strengthening.   Reviewed nutrition-related labs and supplementation for repletion.   Patient demonstrates understanding.  Co-developed goals to work towards.   Provided pt with list of goals and resources below via MiaSolÃ©t.     Expected Engagement: good    Nutrition Goals  [facility]. Goal of 4896-0940 kcal/day - Try to track right after eating   30+ gm protein at each eating encounter (at least 75 gm protein daily)   Aim to go to the gym 2x/week if schedule allows   Aim to limit self to 1 Frappe per week at new job   Continue taking iron and Vitamin D     Example Meal Plan:  Breakfast: breakfast bar + 2 string cheese  Lunch: lunchable/tuna pack + shake and mini veggie tray   Afternoon-snack: protein shake;   Dinner: palm-size meat/fish + 1/2 cup carb and 2 cup veggies     Quick/Easy Protein Sources:  Hard boiled eggs  Part-skim cheese sticks  Baby Bell cheese rounds  Low-fat/low-sugar Greek  "yogurt  Low-fat cottage cheese  Lean deli meat (chicken/turkey/ham)  Roasted chickpeas or lentils  Nuts   Turkey meat stick  Protein shake/bar  \"P3\" snack (cheese, nuts, deli meat)  Aldi's \"Protein Bread\"   \"Egglife\" egg white wrap    Tuna/salmon can/packet     The Plate Method  [email or web address]    Protein Sources   [email or web address]     Carbohydrates  [email or web address]     Mindful Eating  [email or web address]     Summary of Volumetrics Eating Plan  [email or web address]       Follow-Up:  Monday, April 7th at 11:30 am    Time spent with patient: 18 minutes.  ADELAIDA Arceo, RD, LD  Clinic #: [phone]        "

## 2025-02-11 ENCOUNTER — LAB SERVICES (OUTPATIENT)
Dept: LAB | Age: 35
End: 2025-02-11

## 2025-02-11 ENCOUNTER — TELEPHONE (OUTPATIENT)
Dept: BEHAVIORAL HEALTH | Age: 35
End: 2025-02-11

## 2025-02-11 DIAGNOSIS — Z79.899 MEDICATION MANAGEMENT: ICD-10-CM

## 2025-02-11 DIAGNOSIS — F31.9 BIPOLAR 1 DISORDER  (CMD): ICD-10-CM

## 2025-02-11 DIAGNOSIS — D50.8 OTHER IRON DEFICIENCY ANEMIA: ICD-10-CM

## 2025-02-11 LAB
BASOPHILS # BLD: 0.3 K/MCL (ref 0–0.3)
BASOPHILS NFR BLD: 3 %
DEPRECATED RDW RBC: 45.9 FL (ref 39–50)
EOSINOPHIL # BLD: 0.6 K/MCL (ref 0–0.5)
EOSINOPHIL NFR BLD: 5 %
ERYTHROCYTE [DISTWIDTH] IN BLOOD: 19.7 % (ref 11–15)
HCT VFR BLD CALC: 33.6 % (ref 36–46.5)
HGB BLD-MCNC: 9.7 G/DL (ref 12–15.5)
LYMPHOCYTES # BLD: 2.9 K/MCL (ref 1–4.8)
LYMPHOCYTES NFR BLD: 26 %
MCH RBC QN AUTO: 19.6 PG (ref 26–34)
MCHC RBC AUTO-ENTMCNC: 28.9 G/DL (ref 32–36.5)
MCV RBC AUTO: 67.9 FL (ref 78–100)
MONOCYTES # BLD: 0.4 K/MCL (ref 0.3–0.9)
MONOCYTES NFR BLD: 4 %
NEUTROPHILS # BLD: 6.9 K/MCL (ref 1.8–7.7)
NEUTS SEG NFR BLD: 62 %
NRBC BLD MANUAL-RTO: 0 /100 WBC
OVALOCYTES BLD QL SMEAR: ABNORMAL
PLAT MORPH BLD: NORMAL
PLATELET # BLD AUTO: 343 K/MCL (ref 140–450)
POLYCHROMASIA BLD QL SMEAR: ABNORMAL
RBC # BLD: 4.95 MIL/MCL (ref 4–5.2)
WBC # BLD: 11.1 K/MCL (ref 4.2–11)
WBC MORPH BLD: NORMAL

## 2025-02-11 PROCEDURE — 36415 COLL VENOUS BLD VENIPUNCTURE: CPT | Performed by: INTERNAL MEDICINE

## 2025-02-11 PROCEDURE — 85027 COMPLETE CBC AUTOMATED: CPT | Performed by: INTERNAL MEDICINE

## 2025-02-12 ENCOUNTER — VIRTUAL VISIT (OUTPATIENT)
Dept: ENDOCRINOLOGY | Facility: CLINIC | Age: 35
End: 2025-02-12
Payer: COMMERCIAL

## 2025-02-12 VITALS — BODY MASS INDEX: 53.37 KG/M2 | HEIGHT: 62 IN | WEIGHT: 290 LBS

## 2025-02-12 DIAGNOSIS — E66.813 CLASS 3 SEVERE OBESITY WITH SERIOUS COMORBIDITY AND BODY MASS INDEX (BMI) OF 50.0 TO 59.9 IN ADULT, UNSPECIFIED OBESITY TYPE (H): ICD-10-CM

## 2025-02-12 DIAGNOSIS — E66.01 CLASS 3 SEVERE OBESITY WITH SERIOUS COMORBIDITY AND BODY MASS INDEX (BMI) OF 50.0 TO 59.9 IN ADULT, UNSPECIFIED OBESITY TYPE (H): ICD-10-CM

## 2025-02-12 DIAGNOSIS — Z71.3 NUTRITIONAL COUNSELING: Primary | ICD-10-CM

## 2025-02-12 ASSESSMENT — PAIN SCALES - GENERAL: PAINLEVEL_OUTOF10: NO PAIN (0)

## 2025-02-12 NOTE — LETTER
"2025       RE: Ben Upton  [address] [address]     Dear Colleague,    Thank you for referring your patient, Ben Upton, to the [facility] [place] [facility]. Please see a copy of my visit note below.    Video-Visit Details    Type of service:  Video Visit    Video Start Time: 8:49 am  Video End Time: 9:08 am    Originating Location (pt. Location): Home    Distant Location (provider location):  Offsite (providers home) [facility] [place]     Platform used for Video Visit: CloudSplit      Weight Management Nutrition Consultation    Ben Upton is a 34 year old female presents today for return weight management nutrition consultation.  Patient referred by Juliet Emanuel PA-C on 2024.    Patient with Co-morbidities of obesity includin/24/2024     6:38 PM   --   I have the following health issues associated with obesity Fatty Liver    Asthma   I have the following symptoms associated with obesity Depression    Irregular Menstral Cycle         Anthropometrics:  Started Wegovy at a weight of 310 lbs.     Estimated body mass index is 53.04 kg/m  as calculated from the following:    Height as of this encounter: 1.575 m (5' 2\").    Weight as of this encounter: 131.5 kg (290 lb).     Current weight: 290 lbs      Medications for Weight Loss:  Zepbound - Has to stop soon due to insurance  Will be continuing naltrexone and adding metformin/bupropion     NUTRITION HISTORY  Food allergies: None  Food intolerances: Dairy  Vitamin/Mineral Supplements: Once weekly vitamin D.   - Vit D insufficiency and iron def anemia, recommended to add iron to blister pack per Floresita Salinas, Pharm D. Has not added Vit D to blister pack and has not been taking recently. Is open to starting to take     More recently has been focusing on 1500 doris/day diet, healthier " food choices, bought a bike.   Waking around 7:45 am   Strong cravings for ice cream   Lives with Mom, who cooks a couple meals per week, otherwise she is cooking her own.    Preferences: enjoys variety of foods     8/13/24 - she is planning to increase to 7.5 mg Zepbound. Working on not dining out, planning/prepping more at home. Stocked up on healthy snacks options - turkey sticks, pistachios, fast-breaks; Quest chips;   Less sweets. Went to the [facility], anticipated she was going to get candy there but ended up consciously deciding not to purchase.    9/30/24 - Stop nutritional tracking for a bit but restarted after weight loss slowed. Tracking in [facility], keeping intake to ~1500 doris/d. [place] like she got of track during week of her bday. Doing really well with protein intake. Utilizing protein shake and bar for breakfast replacement. May utilize another protein shake as snack. Doing well with lean protein and veggies at other meals.       Dec 2024 - Just start 12.5 mg Zepbound.  First couple days after injection, feels nauseous after eating. Will lose coverage in Jan 2025. Continues to have challenge with snacking. Recently started on naltrexone and Wellbutrin.    Recent Diet Recall:  Breakfast: Protein bar or shake (Premier Protein)  Lunch: adult lunchable (13 gm pro, 300 doris) and veggie tray on days she works (14 gm protein)    Dinner: Taco dip and tortilla chips; roasted veggies and pre-grilled chicken.   Snacks:   Beverages: Crystal light/water - half gallon yeti daily; sugar-free body armor and lemonade once daily. diet soda once weekly.   Alcohol: None  Dining-out: cut Culvers down to once monthly.     Physical Activity:  Bowling once weekly  Has a gym membership. Would like to start walking at the gym more.     Feb 2025:  Had to stop Zepbound due to insurance. Lost shot was 2 days ago. Met with Floresita Salinas, Pharm D, and developed a plan. (See medication section for plan)    Not tracking intake  currently but wants to again.  Eating out more recently. Meal examples: burger, chicken sandwich  Found a new protein bar for breakfast - fitcrunch, really enjoying them. Low sugar.   Lunch - protein shake paired with something   Dinner meal is hardest - eating at work.    Getting a new job in 2 weeks - [employer].     Hydration: about 1/2 gallon of water/day, occ sugar-free lemonade,  sugar-free gatorade, diet soda or Wyatt cruz but rarely    Progress Towards Previous Goals:  1) Restart nutritional tracking. Aim for 1403-2986 calories/day - Not tracking currently but wants to get back into it.   2) 30+ gm protein at each eating encounter (at least 75 gm protein daily) - Was going well then  fell off the bandwagon  per pt  3) Schedule gym session twice weekly. - Not yet, has been working a lot     Nutrition Prescription  Recommended energy/nutrient modification.    Nutrition Diagnosis  Obesity r/t long history of positive energy balance aeb BMI >30. - Ongoing    Nutrition Intervention  Materials/education provided on hypocaloric diet for weight loss. Discussed following calorie goal or 5875-7167 doris/day for an estimated 1-2 lb wt loss per week.   Reviewed progress towards previous goals. Praised pt on the positive change she has sustained.   Reviewed strategies for increasing physical activity, with emphasis on strengthening.   Reviewed nutrition-related labs and supplementation for repletion.   Patient demonstrates understanding.  Co-developed goals to work towards.   Provided pt with list of goals and resources below via Syntargat.     Expected Engagement: good    Nutrition Goals  Aim to start tracking in [facility] again. Goal of 2588-4366 kcal/day - Try to track right after eating   30+ gm protein at each eating encounter (at least 75 gm protein daily)   Aim to go to the gym 2x/week if schedule allows   Aim to limit self to 1 Frappe per week at new job   Continue taking iron and Vitamin D     Example Meal  "Plan:  Breakfast: breakfast bar + 2 string cheese  Lunch: lunchable/tuna pack + shake and mini veggie tray   Afternoon-snack: protein shake;   Dinner: palm-size meat/fish + 1/2 cup carb and 2 cup veggies     Quick/Easy Protein Sources:  Hard boiled eggs  Part-skim cheese sticks  Baby Bell cheese rounds  Low-fat/low-sugar Greek yogurt  Low-fat cottage cheese  Lean deli meat (chicken/turkey/ham)  Roasted chickpeas or lentils  Nuts   Turkey meat stick  Protein shake/bar  \"P3\" snack (cheese, nuts, deli meat)  Aldi's \"Protein Bread\"   \"Egglife\" egg white wrap    Tuna/salmon can/packet     The Plate Method  [email or web address]    Protein Sources   [email or web address]     Carbohydrates  [email or web address]     Mindful Eating  [email or web address]     Summary of Volumetrics Eating Plan  [email or web address]       Follow-Up:  Monday, April 7th at 11:30 am    Time spent with patient: 18 minutes.  ADELAIDA Arceo, RD, LD  Clinic #: [phone]          Again, thank you for allowing me to participate in the care of your patient.      Sincerely,    Nazanin Collins RD    "

## 2025-02-12 NOTE — NURSING NOTE
Current patient location: home     Is the patient currently in the state of [place]? [place], [place]     Visit mode: VIDEO    If the visit is dropped, the patient can be reconnected by:VIDEO VISIT: Text to cell phone:   Telephone Information:   Mobile [phone]       Will anyone else be joining the visit? NO  (If patient encounters technical issues they should call [phone]    Are changes needed to the allergy or medication list? N/A    Are refills needed on medications prescribed by this physician? NO    Rooming Documentation:  Not applicable      Reason for visit: RECHECK    Wt other than 24 hrs:  week ago   Pain more than one location:  no  Fannie BARBOZA

## 2025-02-12 NOTE — PATIENT INSTRUCTIONS
"  Nutrition Goals  Aim to start tracking in [facility] again. Goal of 2597-4639 kcal/day - Try to track right after eating   30+ gm protein at each eating encounter (at least 75 gm protein daily)   Aim to go to the gym 2x/week if schedule allows   Aim to limit self to 1 Frappe per week at new job   Continue taking iron and Vitamin D     Example Meal Plan:  Breakfast: breakfast bar + 2 string cheese  Lunch: lunchable/tuna pack + shake and mini veggie tray   Afternoon-snack: protein shake;   Dinner: palm-size meat/fish + 1/2 cup carb and 2 cup veggies     Quick/Easy Protein Sources:  Hard boiled eggs  Part-skim cheese sticks  Baby Bell cheese rounds  Low-fat/low-sugar Greek yogurt  Low-fat cottage cheese  Lean deli meat (chicken/turkey/ham)  Roasted chickpeas or lentils  Nuts   Turkey meat stick  Protein shake/bar  \"P3\" snack (cheese, nuts, deli meat)  Aldi's \"Protein Bread\"   \"Egglife\" egg white wrap    Tuna/salmon can/packet     The Plate Method  [email or web address]    Protein Sources   [email or web address]     Carbohydrates  [email or web address]     Mindful Eating  [email or web address]     Summary of Volumetrics Eating Plan  [email or web address]       Follow-Up:  Monday, April 7th at 11:30 am    ADELAIDA Arceo, RD, LD  Clinic #: [phone]    "

## 2025-02-24 ENCOUNTER — APPOINTMENT (OUTPATIENT)
Dept: BEHAVIORAL HEALTH | Age: 35
End: 2025-02-24

## 2025-03-12 ENCOUNTER — LAB SERVICES (OUTPATIENT)
Dept: LAB | Age: 35
End: 2025-03-12

## 2025-03-12 ENCOUNTER — TELEPHONE (OUTPATIENT)
Dept: BEHAVIORAL HEALTH | Age: 35
End: 2025-03-12

## 2025-03-12 DIAGNOSIS — Z79.899 MEDICATION MANAGEMENT: ICD-10-CM

## 2025-03-12 DIAGNOSIS — F31.9 BIPOLAR 1 DISORDER  (CMD): ICD-10-CM

## 2025-03-12 LAB
BASOPHILS # BLD: 0.1 K/MCL (ref 0–0.3)
BASOPHILS NFR BLD: 1 %
DEPRECATED RDW RBC: 46.2 FL (ref 39–50)
EOSINOPHIL # BLD: 0 K/MCL (ref 0–0.5)
EOSINOPHIL NFR BLD: 0 %
ERYTHROCYTE [DISTWIDTH] IN BLOOD: 18.9 % (ref 11–15)
HCT VFR BLD CALC: 33.1 % (ref 36–46.5)
HGB BLD-MCNC: 9.3 G/DL (ref 12–15.5)
IMM GRANULOCYTES # BLD AUTO: 0 K/MCL (ref 0–0.2)
IMM GRANULOCYTES # BLD: 0 %
LYMPHOCYTES # BLD: 2.2 K/MCL (ref 1–4.8)
LYMPHOCYTES NFR BLD: 22 %
MCH RBC QN AUTO: 19.3 PG (ref 26–34)
MCHC RBC AUTO-ENTMCNC: 28.1 G/DL (ref 32–36.5)
MCV RBC AUTO: 68.8 FL (ref 78–100)
MONOCYTES # BLD: 0.7 K/MCL (ref 0.3–0.9)
MONOCYTES NFR BLD: 7 %
NEUTROPHILS # BLD: 6.9 K/MCL (ref 1.8–7.7)
NEUTROPHILS NFR BLD: 70 %
NRBC BLD MANUAL-RTO: 0 /100 WBC
PLATELET # BLD AUTO: 341 K/MCL (ref 140–450)
RBC # BLD: 4.81 MIL/MCL (ref 4–5.2)
WBC # BLD: 9.8 K/MCL (ref 4.2–11)

## 2025-03-12 PROCEDURE — 36415 COLL VENOUS BLD VENIPUNCTURE: CPT | Performed by: INTERNAL MEDICINE

## 2025-03-12 PROCEDURE — 85025 COMPLETE CBC W/AUTO DIFF WBC: CPT | Performed by: INTERNAL MEDICINE

## 2025-03-15 ENCOUNTER — HOSPITAL ENCOUNTER (EMERGENCY)
Age: 35
Discharge: HOME | End: 2025-03-15
Attending: EMERGENCY MEDICINE

## 2025-03-15 VITALS
HEART RATE: 102 BPM | DIASTOLIC BLOOD PRESSURE: 93 MMHG | WEIGHT: 293 LBS | TEMPERATURE: 97.9 F | BODY MASS INDEX: 51.91 KG/M2 | RESPIRATION RATE: 16 BRPM | SYSTOLIC BLOOD PRESSURE: 128 MMHG | HEIGHT: 63 IN | OXYGEN SATURATION: 100 %

## 2025-03-15 DIAGNOSIS — T23.231A PARTIAL THICKNESS BURN OF MULTIPLE FINGERS OF RIGHT HAND EXCLUDING THUMB, INITIAL ENCOUNTER: Primary | ICD-10-CM

## 2025-03-15 PROCEDURE — 90715 TDAP VACCINE 7 YRS/> IM: CPT | Performed by: EMERGENCY MEDICINE

## 2025-03-15 PROCEDURE — 10002803 HB RX 637: Performed by: EMERGENCY MEDICINE

## 2025-03-15 PROCEDURE — 10002800 HB RX 250 W HCPCS: Performed by: EMERGENCY MEDICINE

## 2025-03-15 PROCEDURE — 90471 IMMUNIZATION ADMIN: CPT | Performed by: EMERGENCY MEDICINE

## 2025-03-15 PROCEDURE — 99283 EMERGENCY DEPT VISIT LOW MDM: CPT

## 2025-03-15 RX ORDER — SILVER SULFADIAZINE 10 MG/G
CREAM TOPICAL ONCE
Status: COMPLETED | OUTPATIENT
Start: 2025-03-15 | End: 2025-03-15

## 2025-03-15 RX ORDER — SILVER SULFADIAZINE 10 MG/G
1 CREAM TOPICAL 2 TIMES DAILY
Qty: 20 G | Refills: 0 | Status: SHIPPED
Start: 2025-03-15

## 2025-03-15 RX ADMIN — SILVER SULFADIAZINE: 10 CREAM TOPICAL at 11:11

## 2025-03-15 RX ADMIN — TETANUS TOXOID, REDUCED DIPHTHERIA TOXOID AND ACELLULAR PERTUSSIS VACCINE, ADSORBED 0.5 ML: 5; 2.5; 8; 8; 2.5 SUSPENSION INTRAMUSCULAR at 11:15

## 2025-03-15 SDOH — SOCIAL STABILITY: SOCIAL INSECURITY: HOW OFTEN DOES ANYONE, INCLUDING FAMILY AND FRIENDS, INSULT OR TALK DOWN TO YOU?: NEVER

## 2025-03-15 SDOH — SOCIAL STABILITY: SOCIAL INSECURITY: HOW OFTEN DOES ANYONE, INCLUDING FAMILY AND FRIENDS, PHYSICALLY HURT YOU?: NEVER

## 2025-03-15 SDOH — SOCIAL STABILITY: SOCIAL INSECURITY: HOW OFTEN DOES ANYONE, INCLUDING FAMILY AND FRIENDS, SCREAM OR CURSE AT YOU?: NEVER

## 2025-03-15 SDOH — SOCIAL STABILITY: SOCIAL INSECURITY: HOW OFTEN DOES ANYONE, INCLUDING FAMILY AND FRIENDS, THREATEN YOU WITH HARM?: NEVER

## 2025-03-15 ASSESSMENT — ENCOUNTER SYMPTOMS
BRUISES/BLEEDS EASILY: 0
WOUND: 1
FEVER: 0

## 2025-03-15 ASSESSMENT — PAIN DESCRIPTION - PAIN TYPE: TYPE: ACUTE PAIN

## 2025-03-15 ASSESSMENT — PAIN SCALES - GENERAL: PAINLEVEL_OUTOF10: 4

## 2025-03-17 ENCOUNTER — CASE MANAGEMENT (OUTPATIENT)
Dept: OCCUPATIONAL MEDICINE | Age: 35
End: 2025-03-17

## 2025-03-21 ENCOUNTER — APPOINTMENT (OUTPATIENT)
Dept: LAB | Age: 35
End: 2025-03-21

## 2025-03-21 DIAGNOSIS — F42.2 MIXED OBSESSIONAL THOUGHTS AND ACTS: ICD-10-CM

## 2025-03-21 DIAGNOSIS — F41.1 GAD (GENERALIZED ANXIETY DISORDER): ICD-10-CM

## 2025-03-24 ENCOUNTER — APPOINTMENT (OUTPATIENT)
Dept: BEHAVIORAL HEALTH | Age: 35
End: 2025-03-24

## 2025-03-24 DIAGNOSIS — F41.1 GAD (GENERALIZED ANXIETY DISORDER): ICD-10-CM

## 2025-03-24 DIAGNOSIS — F31.9 BIPOLAR 1 DISORDER  (CMD): Primary | ICD-10-CM

## 2025-03-24 DIAGNOSIS — F90.0 ATTENTION DEFICIT HYPERACTIVITY DISORDER (ADHD), PREDOMINANTLY INATTENTIVE TYPE: ICD-10-CM

## 2025-03-24 DIAGNOSIS — G47.09 INITIAL INSOMNIA: ICD-10-CM

## 2025-03-24 DIAGNOSIS — G24.01 TARDIVE DYSKINESIA: ICD-10-CM

## 2025-03-24 DIAGNOSIS — F42.2 MIXED OBSESSIONAL THOUGHTS AND ACTS: ICD-10-CM

## 2025-03-24 PROCEDURE — 99214 OFFICE O/P EST MOD 30 MIN: CPT

## 2025-03-24 RX ORDER — CLOMIPRAMINE HYDROCHLORIDE 75 MG/1
75 CAPSULE ORAL NIGHTLY
Qty: 30 CAPSULE | Refills: 11 | Status: SHIPPED | OUTPATIENT
Start: 2025-03-24

## 2025-03-27 NOTE — PROGRESS NOTES
"Video-Visit Details    Type of service:  Video Visit    Video Start Time: 8:55 am  Video End Time: 9:10 am    Originating Location (pt. Location): Home    Distant Location (provider location):  Offsite (providers home) [facility]NA[facility] [place]     Platform used for Video Visit: Digital Karma      Weight Management Nutrition Consultation    Ben Upton is a 34 year old female presents today for return weight management nutrition consultation.  Patient referred by Juliet Emanuel PA-C on 2024.    Patient with Co-morbidities of obesity includin/24/2024     6:38 PM   --   I have the following health issues associated with obesity Fatty Liver    Asthma   I have the following symptoms associated with obesity Depression    Irregular Menstral Cycle         Anthropometrics:  Started Wegovy at a weight of 310 lbs.     Estimated body mass index is 54.87 kg/m  as calculated from the following:    Height as of this encounter: 1.575 m (5' 2\").    Weight as of this encounter: 136.1 kg (300 lb).     Current weight: 300 lbs   - +10 lbs since last RD      Medications for Weight Loss:  Zepbound - Had to stop due to insurance  Will be continuing naltrexone and adding metformin/bupropion     NUTRITION HISTORY  Food allergies: None  Food intolerances: Dairy  Vitamin/Mineral Supplements: Once weekly vitamin D.   - Vit D insufficiency and iron def anemia, recommended to add iron to blister pack per Floresita Salinas, Pharm D.     More recently has been focusing on 1500 doris/day diet, healthier food choices, bought a bike.   Waking around 7:45 am   Strong cravings for ice cream   Lives with Mom, who cooks a couple meals per week, otherwise she is cooking her own.    Preferences: enjoys variety of foods     24 - she is planning to increase to 7.5 mg Zepbound. Working on not dining out, planning/prepping more at home. Stocked up on healthy snacks options - turkey sticks, pistachios, " fast-breaks; Quest chips;   Less sweets. Went to the [employer], anticipated she was going to get candy there but ended up consciously deciding not to purchase.    9/30/24 - Stop nutritional tracking for a bit but restarted after weight loss slowed. Tracking in [facility], keeping intake to ~1500 doris/d. [place] like she got of track during week of her bday. Doing really well with protein intake. Utilizing protein shake and bar for breakfast replacement. May utilize another protein shake as snack. Doing well with lean protein and veggies at other meals.       Dec 2024 - Just start 12.5 mg Zepbound.  First couple days after injection, feels nauseous after eating. Will lose coverage in Jan 2025. Continues to have challenge with snacking. Recently started on naltrexone and Wellbutrin.    Recent Diet Recall:  Breakfast: Protein bar or shake (Premier Protein)  Lunch: adult lunchable (13 gm pro, 300 doris) and veggie tray on days she works (14 gm protein)    Dinner: Taco dip and tortilla chips; roasted veggies and pre-grilled chicken.   Snacks:   Beverages: Crystal light/water - half gallon yeti daily; sugar-free body armor and lemonade once daily. diet soda once weekly.   Alcohol: None  Dining-out: cut Culvers down to once monthly.     Physical Activity:  Bowling once weekly  Has a gym membership. Would like to start walking at the gym more.     Feb 2025:  Had to stop Zepbound due to insurance. Last shot was 2 days ago. Met with Floresita Salinas, Pharm D, and developed a plan. (See medication section for plan)    Not tracking intake currently but wants to again.  Eating out more recently. Meal examples: burkeena, chicken sandwich  Found a new protein bar for breakfast - fitcrunch, really enjoying them. Low sugar.   Lunch - protein shake paired with something   Dinner meal is hardest - eating at work.    Getting a new job in 2 weeks - [employer].     Hydration: about 1/2 gallon of water/day, occ sugar-free lemonade,  sugar-free  gatorade, diet soda or Wyatt cruz but rarely    April 2025:  Off of Zepbound now due to insurance. More cravings since being off - sweets. Wants to work on snacking. Trying to find healthier options - lower sugar mini muffins, Rx bars. Also bought some high protein atkins meals.    B - protein bar OR sandwich at [facility]   L - at [facility], choices vary. Often getting chicken pocket (200 kcal)   D - varies, last night had protein box from work. Night before had veggie tray with potato/veggies (220 kcal) and added some brat burger to it. Otherwise having the higher protein atkins meal    Cousin passed last week - heart issue. Trying to be more mindful of her eating due to this.     Progress Towards Previous Goals:  Aim to start tracking in [facility] again. Goal of 4026-3523 kcal/day - Try to track right after eating - Tracking here/there. Wants to be more consistent. Tends to do better when tracking per pt.   30+ gm protein at each eating encounter (at least 75 gm protein daily)  - In progress   Aim to go to the gym 2x/week if schedule allows - Hasn't been going to the gym but has started biking to work, 4 minute bike ride.    Aim to limit self to 1 Frappe per week at new job - Met  Continue taking iron and Vitamin D - Not taking consistently. Barriers: none      Nutrition Prescription  Recommended energy/nutrient modification.    Nutrition Diagnosis  Obesity r/t long history of positive energy balance aeb BMI >30. - Ongoing    Nutrition Intervention  Materials/education provided on hypocaloric diet for weight loss. Discussed following calorie goal or 2386-5921 doris/day for an estimated 1-2 lb wt loss per week.   Reviewed progress towards previous goals. Praised pt on the positive change she has sustained.   Reviewed strategies for increasing physical activity, with emphasis on strengthening.   Reviewed nutrition-related labs and supplementation for repletion.   Patient demonstrates understanding.  Co-developed  "goals to work towards.   Provided pt with list of goals and resources below via Gentronixt.     Expected Engagement: good    Nutrition Goals  Aim for 20-30+ gm protein at each eating encounter (at least 75 gm protein daily)   A[facility] to start [facility] in [facility] again. Goal of 7224-7437 kcal/day - Try to track right after eating   Aim to take vitamin D and iron consistently   Aim to limit self to 1 Frappe per week     Example Meal Plan:  Breakfast: breakfast bar + 2 string cheese  Lunch: lunchable/tuna pack + shake and mini veggie tray   Afternoon-snack: protein shake;   Dinner: palm-size meat/fish + 1/2 cup carb and 2 cup veggies     Quick/Easy Protein Sources:  Hard boiled eggs  Part-skim cheese sticks  Baby Bell cheese rounds  Low-fat/low-sugar Greek yogurt  Low-fat cottage cheese  Lean deli meat (chicken/turkey/ham)  Roasted chickpeas or lentils  Nuts   Turkey meat stick  Protein shake/bar  \"P3\" snack (cheese, nuts, deli meat)  Aldi's \"Protein Bread\"   \"Egglife\" egg white wrap    Tuna/salmon can/packet     The Plate Method  [email or web address]    Protein Sources   [email or web address]     Carbohydrates  [email or web address]     Mindful Eating  [email or web address]     Summary of Volumetrics Eating Plan  [email or web address]       Follow-Up:  Monday, June 9th at 9:00 am    Time spent with patient: 15 minutes.  ADELAIDA Arceo, RD, LD  Clinic #: [phone]        "

## 2025-04-01 ENCOUNTER — TELEPHONE (OUTPATIENT)
Dept: BEHAVIORAL HEALTH | Age: 35
End: 2025-04-01

## 2025-04-02 ENCOUNTER — TELEPHONE (OUTPATIENT)
Dept: BEHAVIORAL HEALTH | Age: 35
End: 2025-04-02

## 2025-04-03 ENCOUNTER — TELEPHONE (OUTPATIENT)
Dept: GASTROENTEROLOGY | Age: 35
End: 2025-04-03

## 2025-04-03 ENCOUNTER — BEHAVIORAL HEALTH (OUTPATIENT)
Dept: BEHAVIORAL HEALTH | Age: 35
End: 2025-04-03

## 2025-04-03 DIAGNOSIS — F90.0 ATTENTION DEFICIT HYPERACTIVITY DISORDER (ADHD), PREDOMINANTLY INATTENTIVE TYPE: ICD-10-CM

## 2025-04-03 DIAGNOSIS — F42.2 MIXED OBSESSIONAL THOUGHTS AND ACTS: ICD-10-CM

## 2025-04-03 DIAGNOSIS — F41.1 GAD (GENERALIZED ANXIETY DISORDER): ICD-10-CM

## 2025-04-03 DIAGNOSIS — K76.0 FATTY LIVER: Primary | ICD-10-CM

## 2025-04-03 DIAGNOSIS — F31.9 BIPOLAR 1 DISORDER  (CMD): Primary | ICD-10-CM

## 2025-04-03 RX ORDER — 0.9 % SODIUM CHLORIDE 0.9 %
2 VIAL (ML) INJECTION EVERY 12 HOURS SCHEDULED
Status: CANCELLED | OUTPATIENT
Start: 2025-04-03

## 2025-04-03 RX ORDER — HUMAN INSULIN 100 [IU]/ML
INJECTION, SOLUTION SUBCUTANEOUS
Status: CANCELLED | OUTPATIENT
Start: 2025-04-03

## 2025-04-03 RX ORDER — 0.9 % SODIUM CHLORIDE 0.9 %
10 VIAL (ML) INJECTION PRN
Status: CANCELLED | OUTPATIENT
Start: 2025-04-03

## 2025-04-07 ENCOUNTER — VIRTUAL VISIT (OUTPATIENT)
Dept: ENDOCRINOLOGY | Facility: CLINIC | Age: 35
End: 2025-04-07
Payer: COMMERCIAL

## 2025-04-07 ENCOUNTER — APPOINTMENT (OUTPATIENT)
Dept: LAB | Age: 35
End: 2025-04-07

## 2025-04-07 ENCOUNTER — VIRTUAL VISIT (OUTPATIENT)
Dept: PHARMACY | Facility: CLINIC | Age: 35
End: 2025-04-07
Attending: NURSE PRACTITIONER
Payer: COMMERCIAL

## 2025-04-07 VITALS — HEIGHT: 62 IN | BODY MASS INDEX: 53.92 KG/M2 | WEIGHT: 293 LBS

## 2025-04-07 VITALS — BODY MASS INDEX: 53.04 KG/M2 | HEIGHT: 62 IN

## 2025-04-07 DIAGNOSIS — R73.03 PREDIABETES: ICD-10-CM

## 2025-04-07 DIAGNOSIS — Z71.3 NUTRITIONAL COUNSELING: Primary | ICD-10-CM

## 2025-04-07 DIAGNOSIS — E66.813 CLASS 3 SEVERE OBESITY WITH SERIOUS COMORBIDITY AND BODY MASS INDEX (BMI) OF 50.0 TO 59.9 IN ADULT, UNSPECIFIED OBESITY TYPE (H): ICD-10-CM

## 2025-04-07 DIAGNOSIS — Z79.899 MEDICATION MANAGEMENT: ICD-10-CM

## 2025-04-07 DIAGNOSIS — Z13.220 LIPID SCREENING: ICD-10-CM

## 2025-04-07 DIAGNOSIS — K76.0 FATTY LIVER: ICD-10-CM

## 2025-04-07 DIAGNOSIS — E66.01 CLASS 3 SEVERE OBESITY DUE TO EXCESS CALORIES WITH SERIOUS COMORBIDITY AND BODY MASS INDEX (BMI) OF 50.0 TO 59.9 IN ADULT (H): Primary | ICD-10-CM

## 2025-04-07 DIAGNOSIS — F31.9 BIPOLAR 1 DISORDER (H): ICD-10-CM

## 2025-04-07 DIAGNOSIS — E55.9 VITAMIN D INSUFFICIENCY: ICD-10-CM

## 2025-04-07 DIAGNOSIS — E55.9 VITAMIN D DEFICIENCY: ICD-10-CM

## 2025-04-07 DIAGNOSIS — E66.01 CLASS 3 SEVERE OBESITY WITH SERIOUS COMORBIDITY AND BODY MASS INDEX (BMI) OF 50.0 TO 59.9 IN ADULT, UNSPECIFIED OBESITY TYPE (H): ICD-10-CM

## 2025-04-07 DIAGNOSIS — F90.0 ATTENTION DEFICIT HYPERACTIVITY DISORDER (ADHD), PREDOMINANTLY INATTENTIVE TYPE: ICD-10-CM

## 2025-04-07 DIAGNOSIS — Z00.00 ANNUAL PHYSICAL EXAM: ICD-10-CM

## 2025-04-07 DIAGNOSIS — D50.9 IRON DEFICIENCY ANEMIA, UNSPECIFIED IRON DEFICIENCY ANEMIA TYPE: ICD-10-CM

## 2025-04-07 DIAGNOSIS — E66.813 CLASS 3 SEVERE OBESITY DUE TO EXCESS CALORIES WITH SERIOUS COMORBIDITY AND BODY MASS INDEX (BMI) OF 50.0 TO 59.9 IN ADULT (H): Primary | ICD-10-CM

## 2025-04-07 DIAGNOSIS — F31.9 BIPOLAR 1 DISORDER  (CMD): ICD-10-CM

## 2025-04-07 DIAGNOSIS — F41.1 GAD (GENERALIZED ANXIETY DISORDER): ICD-10-CM

## 2025-04-07 LAB
ALBUMIN SERPL-MCNC: 3.1 G/DL (ref 3.4–5)
ALBUMIN/GLOB SERPL: 0.9 {RATIO} (ref 1–2.4)
ALP SERPL-CCNC: 155 UNITS/L (ref 45–117)
ALT SERPL-CCNC: 27 UNITS/L
ANION GAP SERPL CALC-SCNC: 9 MMOL/L (ref 7–19)
AST SERPL-CCNC: 18 UNITS/L
BASOPHILS # BLD: 0.1 K/MCL (ref 0–0.3)
BASOPHILS NFR BLD: 1 %
BILIRUB SERPL-MCNC: 0.4 MG/DL (ref 0.2–1)
BUN SERPL-MCNC: 12 MG/DL (ref 6–20)
BUN/CREAT SERPL: 12 (ref 7–25)
CALCIUM SERPL-MCNC: 9.1 MG/DL (ref 8.4–10.2)
CHLORIDE SERPL-SCNC: 110 MMOL/L (ref 97–110)
CHOLEST SERPL-MCNC: 158 MG/DL
CHOLEST/HDLC SERPL: 2.8 {RATIO}
CO2 SERPL-SCNC: 25 MMOL/L (ref 21–32)
CREAT SERPL-MCNC: 0.98 MG/DL (ref 0.51–0.95)
DEPRECATED RDW RBC: 47 FL (ref 39–50)
EGFRCR SERPLBLD CKD-EPI 2021: 79 ML/MIN/{1.73_M2}
EOSINOPHIL # BLD: 0.6 K/MCL (ref 0–0.5)
EOSINOPHIL NFR BLD: 6 %
ERYTHROCYTE [DISTWIDTH] IN BLOOD: 19.3 % (ref 11–15)
FASTING DURATION TIME PATIENT: 8 HOURS (ref 0–999)
GLOBULIN SER-MCNC: 3.4 G/DL (ref 2–4)
GLUCOSE SERPL-MCNC: 96 MG/DL (ref 70–99)
HCT VFR BLD CALC: 32.5 % (ref 36–46.5)
HDLC SERPL-MCNC: 56 MG/DL
HGB BLD-MCNC: 9.5 G/DL (ref 12–15.5)
IMM GRANULOCYTES # BLD AUTO: 0 K/MCL (ref 0–0.2)
IMM GRANULOCYTES # BLD: 0 %
LDLC SERPL CALC-MCNC: 90 MG/DL
LYMPHOCYTES # BLD: 2.5 K/MCL (ref 1–4.8)
LYMPHOCYTES NFR BLD: 25 %
MCH RBC QN AUTO: 20.1 PG (ref 26–34)
MCHC RBC AUTO-ENTMCNC: 29.2 G/DL (ref 32–36.5)
MCV RBC AUTO: 68.7 FL (ref 78–100)
MONOCYTES # BLD: 0.6 K/MCL (ref 0.3–0.9)
MONOCYTES NFR BLD: 6 %
NEUTROPHILS # BLD: 6.2 K/MCL (ref 1.8–7.7)
NEUTROPHILS NFR BLD: 62 %
NONHDLC SERPL-MCNC: 102 MG/DL
NRBC BLD MANUAL-RTO: 0 /100 WBC
PLATELET # BLD AUTO: 326 K/MCL (ref 140–450)
POTASSIUM SERPL-SCNC: 4.2 MMOL/L (ref 3.4–5.1)
PROT SERPL-MCNC: 6.5 G/DL (ref 6.4–8.2)
RBC # BLD: 4.73 MIL/MCL (ref 4–5.2)
SODIUM SERPL-SCNC: 140 MMOL/L (ref 135–145)
TRIGL SERPL-MCNC: 59 MG/DL
WBC # BLD: 9.9 K/MCL (ref 4.2–11)

## 2025-04-07 PROCEDURE — 1126F AMNT PAIN NOTED NONE PRSNT: CPT | Mod: 95 | Performed by: DIETITIAN, REGISTERED

## 2025-04-07 PROCEDURE — 80061 LIPID PANEL: CPT | Performed by: INTERNAL MEDICINE

## 2025-04-07 PROCEDURE — 36415 COLL VENOUS BLD VENIPUNCTURE: CPT | Performed by: INTERNAL MEDICINE

## 2025-04-07 PROCEDURE — 99207 PR NO CHARGE LOS: CPT | Mod: 95 | Performed by: DIETITIAN, REGISTERED

## 2025-04-07 PROCEDURE — 80053 COMPREHEN METABOLIC PANEL: CPT | Performed by: INTERNAL MEDICINE

## 2025-04-07 PROCEDURE — 97803 MED NUTRITION INDIV SUBSEQ: CPT | Mod: 95 | Performed by: DIETITIAN, REGISTERED

## 2025-04-07 PROCEDURE — 85025 COMPLETE CBC W/AUTO DIFF WBC: CPT | Performed by: INTERNAL MEDICINE

## 2025-04-07 PROCEDURE — 82306 VITAMIN D 25 HYDROXY: CPT | Performed by: CLINICAL MEDICAL LABORATORY

## 2025-04-07 ASSESSMENT — PAIN SCALES - GENERAL
PAINLEVEL_OUTOF10: NO PAIN (0)
PAINLEVEL_OUTOF10: NO PAIN (0)

## 2025-04-07 NOTE — PATIENT INSTRUCTIONS
"Recommendations from today's MTM visit:                                                      Start metformin 1 tablet daily with dinner for one week, then increase to 2 tablets daily with dinner. Add to blister packs once taking 2 tablets daily.   Start naltrexone 1/2 tablet once daily with dinner for one week, then increase to 1 tablet daily with dinner. Add to blister packs once taking 1 tablet daily.   Start bupropion 1 tablet once daily in the morning. Add to blister packs to help with adherence.   [facility] requires a prescription to add supplements to blister packs. Please ask your primary care provider for an iron and vitamin D prescription and ask [facility] to add to blister packs to help with adherence.    Follow-up: 6 weeks with medication therapy management, 3 months with Juliet Emanuel PA-C    It was great speaking with you today.  I value your experience and would be very thankful for your time in providing feedback in our clinic survey. In the next few days, you may receive an email or text message from [employer] with a link to a survey related to your  clinical pharmacist.\"     To schedule another MTM appointment, please call the clinic directly or you may call the MTM scheduling line at [phone].    My Clinical Pharmacist's contact information:                                                      Please feel free to contact me with any questions or concerns you have.      Floresita Salinas, PharmD, AAHIVP  Medication Therapy Management Pharmacist      "

## 2025-04-07 NOTE — LETTER
"2025       RE: Ben Upton  [address] [address]     Dear Colleague,    Thank you for referring your patient, Ben Upton, to the [facility] [place] at [facility]. Please see a copy of my visit note below.    Video-Visit Details    Type of service:  Video Visit    Video Start Time: 8:55 am  Video End Time: 9:10 am    Originating Location (pt. Location): Home    Distant Location (provider location):  Offsite (providers home) [facility] [place]     Platform used for Video Visit: [email or web address]      Weight Management Nutrition Consultation    Ben Upton is a 34 year old female presents today for return weight management nutrition consultation.  Patient referred by Juliet Emanuel PA-C on 2024.    Patient with Co-morbidities of obesity includin/24/2024     6:38 PM   --   I have the following health issues associated with obesity Fatty Liver    Asthma   I have the following symptoms associated with obesity Depression    Irregular Menstral Cycle         Anthropometrics:  Started Wegovy at a weight of 310 lbs.     Estimated body mass index is 54.87 kg/m  as calculated from the following:    Height as of this encounter: 1.575 m (5' 2\").    Weight as of this encounter: 136.1 kg (300 lb).     Current weight: 300 lbs   - +10 lbs since last RD      Medications for Weight Loss:  Zepbound - Had to stop due to insurance  Will be continuing naltrexone and adding metformin/bupropion     NUTRITION HISTORY  Food allergies: None  Food intolerances: Dairy  Vitamin/Mineral Supplements: Once weekly vitamin D.   - Vit D insufficiency and iron def anemia, recommended to add iron to blister pack per Floresita Salinas, Pharm D.     More recently has been focusing on 1500 doris/day diet, healthier food choices, bought a bike.   Waking around 7:45 am   Strong cravings for ice " cream   Lives with Mom, who cooks a couple meals per week, otherwise she is cooking her own.    Preferences: enjoys variety of foods     8/13/24 - she is planning to increase to 7.5 mg Zepbound. Working on not dining out, planning/prepping more at home. Stocked up on healthy snacks options - turkey sticks, pistachios, fast-breaks; Quest chips;   Less sweets. Went to the test company, anticipated she was going to get candy there but ended up consciously deciding not to purchase.    9/30/24 - Stop nutritional tracking for a bit but restarted after weight loss slowed. Tracking in [employer], keeping intake to ~1500 doris/d. [place] like she got of track during week of her bday. Doing really well with protein intake. Utilizing protein shake and bar for breakfast replacement. May utilize another protein shake as snack. Doing well with lean protein and veggies at other meals.       Dec 2024 - Just start 12.5 mg Zepbound.  First couple days after injection, feels nauseous after eating. Will lose coverage in Jan 2025. Continues to have challenge with snacking. Recently started on naltrexone and Wellbutrin.    Recent Diet Recall:  Breakfast: Protein bar or shake (Premier Protein)  Lunch: adult lunchable (13 gm pro, 300 doris) and veggie tray on days she works (14 gm protein)    Dinner: Taco dip and tortilla chips; roasted veggies and pre-grilled chicken.   Snacks:   Beverages: Crystal light/water - half gallon yeti daily; sugar-free body armor and lemonade once daily. diet soda once weekly.   Alcohol: None  Dining-out: cut Culvers down to once monthly.     Physical Activity:  Bowling once weekly  Has a gym membership. Would like to start walking at the gym more.     Feb 2025:  Had to stop Zepbound due to insurance. Last shot was 2 days ago. Met with Floresita Salinas, Pharm D, and developed a plan. (See medication section for plan)    Not tracking intake currently but wants to again.  Eating out more recently. Meal examples:  burger, chicken sandwich  Found a new protein bar for breakfast - jamarruvicky, really enjoying them. Low sugar.   Lunch - protein shake paired with something   Dinner meal is hardest - eating at work.    Getting a new job in 2 weeks - [employer].     Hydration: about 1/2 gallon of water/day, occ sugar-free lemonade,  sugar-free gatorade, diet soda or Wyatt cruz but rarely    April 2025:  Off of Zepbound now due to insurance. More cravings since being off - sweets. Wants to work on snacking. Trying to find healthier options - lower sugar mini muffins, Rx bars. Also bought some high protein atkins meals.    B - protein bar OR sandwich at [facility]   L - at [employer], choices vary. Often getting chicken pocket (200 kcal)   D - varies, last night had protein box from work. Night before had veggie tray with potato/veggies (220 kcal) and added some brat burger to it. Otherwise having the higher protein atkins meal    Cousin passed last week - heart issue. Trying to be more mindful of her eating due to this.     Progress Towards Previous Goals:  [facility]. Goal of 9275-0180 kcal/day - Try to track right after eating - Tracking here/there. Wants to be more consistent. Tends to do better when tracking per pt.   30+ gm protein at each eating encounter (at least 75 gm protein daily)  - In progress   Aim to go to the gym 2x/week if schedule allows - Hasn't been going to the gym but has started biking to work, 4 minute bike ride.    Aim to limit self to 1 Frappe per week at new job - Met  Continue taking iron and Vitamin D - Not taking consistently. Barriers: none      Nutrition Prescription  Recommended energy/nutrient modification.    Nutrition Diagnosis  Obesity r/t long history of positive energy balance aeb BMI >30. - Ongoing    Nutrition Intervention  Materials/education provided on hypocaloric diet for weight loss. Discussed following calorie goal or 6668-3395 doris/day for an estimated 1-2  "lb wt loss per week.   Reviewed progress towards previous goals. Praised pt on the positive change she has sustained.   Reviewed strategies for increasing physical activity, with emphasis on strengthening.   Reviewed nutrition-related labs and supplementation for repletion.   Patient demonstrates understanding.  Co-developed goals to work towards.   Provided pt with list of goals and resources below via [employer].     Expected Engagement: good    Nutrition Goals  Aim for 20-30+ gm protein at each eating encounter (at least 75 gm protein daily)   [facility] to start [facility] in [facility] again. Goal of 0797-9442 kcal/day - Try to track right after eating   Aim to take vitamin D and iron consistently   Aim to limit self to 1 Frappe per week     Example Meal Plan:  Breakfast: breakfast bar + 2 string cheese  Lunch: lunchable/tuna pack + shake and mini veggie tray   Afternoon-snack: protein shake;   Dinner: palm-size meat/fish + 1/2 cup carb and 2 cup veggies     Quick/Easy Protein Sources:  Hard boiled eggs  Part-skim cheese sticks  Baby Bell cheese rounds  Low-fat/low-sugar Greek yogurt  Low-fat cottage cheese  Lean deli meat (chicken/turkey/ham)  Roasted chickpeas or lentils  Nuts   Turkey meat stick  Protein shake/bar  \"P3\" snack (cheese, nuts, deli meat)  Aldi's \"Protein Bread\"   \"Egglife\" egg white wrap    Tuna/salmon can/packet     The Plate Method  [email or web address]    Protein Sources   [email or web address]     Carbohydrates  [email or web address]     Mindful Eating  [email or web address]     Summary of Volumetrics Eating Plan  [email or web address]       Follow-Up:  Monday, June 9th at 9:00 am    Time spent with patient: 15 minutes.  ADELAIDA Arceo, RD, LD  Clinic #: [phone]          Again, thank you for allowing me to participate in the care of your patient.      Sincerely,    Nazanin Collins RD    "

## 2025-04-07 NOTE — PATIENT INSTRUCTIONS
"  Nutrition Goals  Aim for 20-30+ gm protein at each eating encounter (at least 75 gm protein daily)   Aim to start tracking in [facility] again. Goal of 2722-2889 kcal/day - Try to track right after eating   Aim to take vitamin D and iron consistently   Aim to limit self to 1 Frappe per week     Example Meal Plan:  Breakfast: breakfast bar + 2 string cheese  Lunch: lunchable/tuna pack + shake and mini veggie tray   Afternoon-snack: protein shake;   Dinner: palm-size meat/fish + 1/2 cup carb and 2 cup veggies     Quick/Easy Protein Sources:  Hard boiled eggs  Part-skim cheese sticks  Baby Bell cheese rounds  Low-fat/low-sugar Greek yogurt  Low-fat cottage cheese  Lean deli meat (chicken/turkey/ham)  Roasted chickpeas or lentils  Nuts   Turkey meat stick  Protein shake/bar  \"P3\" snack (cheese, nuts, deli meat)  Aldi's \"Protein Bread\"   \"Egglife\" egg white wrap    Tuna/salmon can/packet     The Plate Method  [email or web address]    Protein Sources   [email or web address]     Carbohydrates  [email or web address]     Mindful Eating  [email or web address]     Summary of Volumetrics Eating Plan  [email or web address]       Follow-Up:  Monday, June 9th at 9:00 am    ADELAIDA Arceo, RD, LD  Clinic #: [phone]    "

## 2025-04-07 NOTE — PROGRESS NOTES
Medication Therapy Management (MTM) Encounter    ASSESSMENT:                            Medication Adherence/Access: she can call state insurance to confirm if she has pharmacy coverage or not and let us know.    Weight Management /NAFLD/pre-diabetes:  Weight has increased off Zepbound. Discussed need for adherence to oral medications to see if they are helpful. Discussed need to taper up on doses and then they should get added to blister packs to help with adherence. She expressed understanding and agreed with plan.     Iron deficiency anemia:   Hemoglobin has decreased from February to March. Discussed she needs to ask for a prescription to add ferrous sulfate to blister packs to help with adherence. Will call and leave message suggesting this since this has been an ongoing issue.      Vitamin D Insufficiency:  She will discuss new prescription with primary care provider at next appointment so this can get added to pill box. Will see if vitamin D got drawn with last labs.    Bipolar/anxiety/ADHD:   stable    PLAN:                            Start metformin 1 tablet daily with dinner for one week, then increase to 2 tablets daily with dinner. Add to blister packs once taking 2 tablets daily.   Start naltrexone 1/2 tablet once daily with dinner for one week, then increase to 1 tablet daily with dinner. Add to blister packs once taking 1 tablet daily.   Start bupropion 1 tablet once daily in the morning. Add to blister packs to help with adherence.   [facility] requires a prescription to add supplements to blister packs. Please ask your primary care provider for an iron and vitamin D prescription and ask [facility] to add to blister packs to help with adherence.    Follow-up: 6 weeks with medication therapy management, 3 months with Juliet Emanuel PA-C    SUBJECTIVE/OBJECTIVE:                          DERICK Upton is a 34 year old female seen for a follow-up visit.       Reason for visit: weight management  follow-up.    Allergies/ADRs: Reviewed in chart  Past Medical History: Reviewed in chart  Tobacco: She reports that she has never smoked. She has never used smokeless tobacco.  Alcohol: none  [place] insurance- Lives in [place]  Works at [employer]  Medication Adherence/Access:   Has been off Zepbound since February due to insurance coverage ending.   Checked Zepbound coverage through [place] state insurance but it came back saying she doesn't have pharmacy coverage under state plan. She thought she had prescription coverage.  Meds in blister packs through [employer] and then has another pill box. Metformin, bupropion, naltrexone, iron, and vitamin D are not getting blister packed. She has good adherence to blister packs but not the pill box/pill bottles. After last visit, contacted her pharmacy and they require a prescription for over-the-counter medications to be blister packed.    Weight Management /NAFLD/pre-diabetes:    Has primary care provider in [place]. Following with Juliet Emanuel PA-C for weight management clinic. She took the above regimen for a week and it helped her feel less hungry and decreased cravings/food noise. She didn't continue taking the meds and isn't sure why. She is currently struggling with cravings.     Nutrition/diet:   Has been reading food labels and trying to pick foods higher in protein and lower in calories and sugar. Put back junk food in grocery cart the other day. Picked out healthier mini muffins and rx bars instead. Buying high protein frozen meals.  Has been drinking lower calorie Star[employer] drinks and increasing water intake. Has 7 free food items a week at [employer] - has been choosing protein options.    Tried/Failed/Contraindicated Medications:   Metformin: diarrhea. Willing to re-trial.   Wegovy: tolerated okay but zepbound works better     No hx pancreatitis/thyroid cancer  On clozapine which is associated with a significant amount of weight gain.   Can try topiramate as  "next step- clear with psychiatrist.    Initial Consult Weight: 309.5 lbs  A1c: 5.3% - 23 per care everywhere     Current weight today: 0 lbs 0 oz- 309 lb 15.5 oz at 3/15/25 ED visit     Wt Readings from Last 4 Encounters:   25 300 lb (136.1 kg)   25 290 lb (131.5 kg)   25 285 lb (129.3 kg)   24 287 lb (130.2 kg)     Estimated body mass index is 53.04 kg/m  as calculated from the following:    Height as of this encounter: 5' 2\" (1.575 m).    Weight as of 25: 290 lb (131.5 kg).      Iron deficiency anemia:   Vitamin C gummies   Taking vitamin C but not ferrous sulfate. Reports she had lab work recently.      Vitamin D Insufficiency:  Doesn't think this is in blister packs    Bipolar/anxiety/ADHD:   Clozapine 300 mg once daily   Clomipramine 25 mg daily   Atomoxetine 80 mg once daily   Propranolol 20 mg 2 times daily   Hydroxyzine 25-50 mg 3 times daily as needed for panic attacks  Mood is stable. Denies constipation. Reports two cousins  recently. One of her cousins was very overweight. This has made her more motivated to lose weight. Follows with psychiatry and therapist.        Today's Vitals: Ht 5' 2\" (1.575 m)   BMI 53.04 kg/m    ----------------    I spent 25 minutes with this patient today. All changes were made via collaborative practice agreement with Juliet Emanuel PA-C.     A summary of these recommendations was sent via [employer].    Telemedicine Visit Details  The patient's medications can be safely assessed via a telemedicine encounter.  Type of service:  Telephone visit  Originating Location (pt. Location): Home    Distant Location (provider location):  Off-site  Start Time: 8:04 AM  End Time: 8:29 AM     Medication Therapy Recommendations  Class 3 severe obesity due to excess calories with serious comorbidity and body mass index (BMI) of 50.0 to 59.9 in adult (H)   1 Current Medication: metFORMIN (GLUCOPHAGE XR) 500 MG 24 hr tablet   Current Medication Si " tablet by mouth daily with meal for 1 week, then 2 tablets daily with meal.   Rationale: Patient forgets to take - Adherence - Adherence   Recommendation: Provide Adherence Intervention   Status: Patient Agreed - Adherence/Education   Identified Date: 4/7/2025 Completed Date: 4/7/2025

## 2025-04-07 NOTE — NURSING NOTE
Current patient location: home    Is the patient currently [place] of [place]    Visit mode: telephone    If the visit is dropped, the patient can be reconnected by:ext to cell phone:   Telephone Information:   Mobile [phone]       Will anyone else be joining the visit? NO  (If patient encounters technical issues they should call [phone]    Are changes needed to the allergy or medication list? N/A    Are refills needed on medications prescribed by this physician? NO    Rooming Documentation:  Not applicable      Reason for visit: RECHECK    Wt other than 24 hrs:    Pain more than one location: no   Fannie RUSSO

## 2025-04-07 NOTE — NURSING NOTE
Current patient location: home    [place] the patient [place] the [place] of [place]     Visit mode: TELEPHONE    If the visit is dropped, the patient can be reconnected by:TELEPHONE VISIT: Phone number: [phone]    Will anyone else be joining the visit? NO  (If patient encounters technical issues they should call [phone])    Are changes needed to the allergy or medication list? Pt stated no changes to allergies and Pt stated no med changes    Are refills needed on medications prescribed by this physician? NO    Rooming Documentation:  Not applicable      Reason for visit: Medication Therapy Management    Wt other than 24 hrs:  no wt given  Pain more than one location:  no  Fannie RUSSO

## 2025-04-08 ENCOUNTER — TELEPHONE (OUTPATIENT)
Dept: BEHAVIORAL HEALTH | Age: 35
End: 2025-04-08

## 2025-04-08 LAB — 25(OH)D3+25(OH)D2 SERPL-MCNC: 17.3 NG/ML (ref 30–100)

## 2025-04-14 ENCOUNTER — APPOINTMENT (OUTPATIENT)
Dept: INTERNAL MEDICINE | Age: 35
End: 2025-04-14

## 2025-04-14 ENCOUNTER — LAB SERVICES (OUTPATIENT)
Dept: LAB | Age: 35
End: 2025-04-14

## 2025-04-14 ENCOUNTER — APPOINTMENT (OUTPATIENT)
Dept: BEHAVIORAL HEALTH | Age: 35
End: 2025-04-14

## 2025-04-14 VITALS
HEART RATE: 94 BPM | DIASTOLIC BLOOD PRESSURE: 70 MMHG | HEIGHT: 63 IN | SYSTOLIC BLOOD PRESSURE: 118 MMHG | WEIGHT: 293 LBS | BODY MASS INDEX: 51.91 KG/M2 | OXYGEN SATURATION: 97 %

## 2025-04-14 DIAGNOSIS — D50.9 IRON DEFICIENCY ANEMIA, UNSPECIFIED IRON DEFICIENCY ANEMIA TYPE: ICD-10-CM

## 2025-04-14 DIAGNOSIS — F31.9 BIPOLAR 1 DISORDER  (CMD): Primary | ICD-10-CM

## 2025-04-14 DIAGNOSIS — F90.0 ATTENTION DEFICIT HYPERACTIVITY DISORDER (ADHD), PREDOMINANTLY INATTENTIVE TYPE: Primary | ICD-10-CM

## 2025-04-14 DIAGNOSIS — J45.20 MILD INTERMITTENT ASTHMA WITHOUT COMPLICATION (CMD): ICD-10-CM

## 2025-04-14 DIAGNOSIS — R74.8 ELEVATED ALKALINE PHOSPHATASE LEVEL: ICD-10-CM

## 2025-04-14 DIAGNOSIS — K76.0 FATTY LIVER: ICD-10-CM

## 2025-04-14 DIAGNOSIS — E55.9 VITAMIN D DEFICIENCY: ICD-10-CM

## 2025-04-14 DIAGNOSIS — F41.1 GAD (GENERALIZED ANXIETY DISORDER): ICD-10-CM

## 2025-04-14 DIAGNOSIS — F90.0 ATTENTION DEFICIT HYPERACTIVITY DISORDER (ADHD), PREDOMINANTLY INATTENTIVE TYPE: ICD-10-CM

## 2025-04-14 DIAGNOSIS — H61.23 BILATERAL IMPACTED CERUMEN: ICD-10-CM

## 2025-04-14 DIAGNOSIS — F42.2 MIXED OBSESSIONAL THOUGHTS AND ACTS: ICD-10-CM

## 2025-04-14 LAB
IRON SATN MFR SERPL: 6 % (ref 15–45)
IRON SERPL-MCNC: 29 MCG/DL (ref 50–170)
TIBC SERPL-MCNC: 466 MCG/DL (ref 250–450)

## 2025-04-14 PROCEDURE — 99395 PREV VISIT EST AGE 18-39: CPT | Performed by: INTERNAL MEDICINE

## 2025-04-14 PROCEDURE — 90832 PSYTX W PT 30 MINUTES: CPT | Performed by: COUNSELOR

## 2025-04-14 PROCEDURE — 84080 ASSAY ALKALINE PHOSPHATASES: CPT | Performed by: CLINICAL MEDICAL LABORATORY

## 2025-04-14 PROCEDURE — 86381 MITOCHONDRIAL ANTIBODY EACH: CPT | Performed by: CLINICAL MEDICAL LABORATORY

## 2025-04-14 PROCEDURE — 83550 IRON BINDING TEST: CPT | Performed by: INTERNAL MEDICINE

## 2025-04-14 PROCEDURE — 84075 ASSAY ALKALINE PHOSPHATASE: CPT | Performed by: CLINICAL MEDICAL LABORATORY

## 2025-04-14 PROCEDURE — 96127 BRIEF EMOTIONAL/BEHAV ASSMT: CPT | Performed by: INTERNAL MEDICINE

## 2025-04-14 PROCEDURE — 69210 REMOVE IMPACTED EAR WAX UNI: CPT | Performed by: INTERNAL MEDICINE

## 2025-04-14 PROCEDURE — 36415 COLL VENOUS BLD VENIPUNCTURE: CPT | Performed by: INTERNAL MEDICINE

## 2025-04-14 PROCEDURE — 83540 ASSAY OF IRON: CPT | Performed by: INTERNAL MEDICINE

## 2025-04-14 ASSESSMENT — ENCOUNTER SYMPTOMS
FEVER: 0
CONSTIPATION: 0
BACK PAIN: 0
PHOTOPHOBIA: 0
LIGHT-HEADEDNESS: 0
ACTIVITY CHANGE: 0
WEAKNESS: 0
VOMITING: 0
SLEEP DISTURBANCE: 0
APPETITE CHANGE: 0
EYE DISCHARGE: 0
NERVOUS/ANXIOUS: 0
DIARRHEA: 0
TREMORS: 0
FATIGUE: 0
CHEST TIGHTNESS: 0
WOUND: 0
WHEEZING: 0
SINUS PRESSURE: 0
COUGH: 0
POLYPHAGIA: 0
POLYDIPSIA: 0
NAUSEA: 0
ABDOMINAL PAIN: 0
CHILLS: 0
SINUS PAIN: 0
SORE THROAT: 0
SHORTNESS OF BREATH: 0
DIZZINESS: 0
RHINORRHEA: 0
HEADACHES: 0
VOICE CHANGE: 0

## 2025-04-14 ASSESSMENT — ANXIETY QUESTIONNAIRES
2. NOT BEING ABLE TO STOP OR CONTROL WORRYING: 0
GAD7 TOTAL SCORE: 4
6. BECOMING EASILY ANNOYED OR IRRITABLE: SEVERAL DAYS
3. WORRYING TOO MUCH ABOUT DIFFERENT THINGS: SEVERAL DAYS
4. TROUBLE RELAXING: SEVERAL DAYS
IF YOU CHECKED OFF ANY PROBLEMS ON THIS QUESTIONNAIRE, HOW DIFFICULT HAVE THESE PROBLEMS MADE IT FOR YOU TO DO YOUR WORK, TAKE CARE OF THINGS AT HOME, OR GET ALONG WITH OTHER PEOPLE: NOT DIFFICULT AT ALL
2. NOT BEING ABLE TO STOP OR CONTROL WORRYING: NOT AT ALL
5. BEING SO RESTLESS THAT IT IS HARD TO SIT STILL: 0
3. WORRYING TOO MUCH ABOUT DIFFERENT THINGS: 1
7. FEELING AFRAID AS IF SOMETHING AWFUL MIGHT HAPPEN: 1
5. BEING SO RESTLESS THAT IT IS HARD TO SIT STILL: NOT AT ALL
1. FEELING NERVOUS, ANXIOUS, OR ON EDGE: 0
4. TROUBLE RELAXING: 1
1. FEELING NERVOUS, ANXIOUS, OR ON EDGE: NOT AT ALL
6. BECOMING EASILY ANNOYED OR IRRITABLE: 1
7. FEELING AFRAID AS IF SOMETHING AWFUL MIGHT HAPPEN: SEVERAL DAYS

## 2025-04-14 ASSESSMENT — PATIENT HEALTH QUESTIONNAIRE - PHQ9
2. FEELING DOWN, DEPRESSED OR HOPELESS: NOT AT ALL
9. THOUGHTS THAT YOU WOULD BE BETTER OFF DEAD, OR OF HURTING YOURSELF: NOT AT ALL
1. LITTLE INTEREST OR PLEASURE IN DOING THINGS: NOT AT ALL
2. FEELING DOWN, DEPRESSED OR HOPELESS: NOT AT ALL
CLINICAL INTERPRETATION OF PHQ2 SCORE: NO FURTHER SCREENING NEEDED
10. IF YOU CHECKED OFF ANY PROBLEMS, HOW DIFFICULT HAVE THESE PROBLEMS MADE IT FOR YOU TO DO YOUR WORK, TAKE CARE OF THINGS AT HOME, OR GET ALONG WITH OTHER PEOPLE: NOT DIFFICULT AT ALL
4. FEELING TIRED OR HAVING LITTLE ENERGY: SEVERAL DAYS
3. TROUBLE FALLING OR STAYING ASLEEP OR SLEEPING TOO MUCH: NOT AT ALL
SUM OF ALL RESPONSES TO PHQ9 QUESTIONS 1 AND 2: 0
SUM OF ALL RESPONSES TO PHQ9 QUESTIONS 1 AND 2: 0
8. MOVING OR SPEAKING SO SLOWLY THAT OTHER PEOPLE COULD HAVE NOTICED. OR THE OPPOSITE, BEING SO FIGETY OR RESTLESS THAT YOU HAVE BEEN MOVING AROUND A LOT MORE THAN USUAL: NOT AT ALL
SUM OF ALL RESPONSES TO PHQ QUESTIONS 1-9: 2
CLINICAL INTERPRETATION OF PHQ9 SCORE: MINIMAL DEPRESSION
7. TROUBLE CONCENTRATING ON THINGS, SUCH AS READING THE NEWSPAPER OR WATCHING TELEVISION: NOT AT ALL
6. FEELING BAD ABOUT YOURSELF - OR THAT YOU ARE A FAILURE OR HAVE LET YOURSELF OR YOUR FAMILY DOWN: NOT AT ALL
1. LITTLE INTEREST OR PLEASURE IN DOING THINGS: NOT AT ALL
SUM OF ALL RESPONSES TO PHQ9 QUESTIONS 1 AND 2: 0
5. POOR APPETITE, WEIGHT LOSS, OR OVEREATING: SEVERAL DAYS
SUM OF ALL RESPONSES TO PHQ9 QUESTIONS 1 AND 2: 0
CLINICAL INTERPRETATION OF PHQ2 SCORE: NO FURTHER SCREENING NEEDED

## 2025-04-15 LAB — MITOCHONDRIA M2 IGG SER-ACNC: 9.2 UNITS

## 2025-04-16 ENCOUNTER — E-ADVICE (OUTPATIENT)
Dept: INTERNAL MEDICINE | Age: 35
End: 2025-04-16

## 2025-04-16 ENCOUNTER — TELEPHONE (OUTPATIENT)
Dept: INTERNAL MEDICINE | Age: 35
End: 2025-04-16

## 2025-04-17 DIAGNOSIS — E55.9 VITAMIN D INSUFFICIENCY: ICD-10-CM

## 2025-04-17 LAB
ALP BONE SERPL-CCNC: 44 U/L (ref 0–55)
ALP LIVER SERPL-CCNC: 102 U/L (ref 0–94)
ALP OTHER SERPL-CCNC: 0 U/L
ALP SERPL-CCNC: 145 U/L (ref 40–120)

## 2025-04-17 RX ORDER — ERGOCALCIFEROL 1.25 MG/1
1.25 CAPSULE, LIQUID FILLED ORAL
Qty: 4 CAPSULE | Refills: 3 | Status: SHIPPED | OUTPATIENT
Start: 2025-04-17

## 2025-04-23 ENCOUNTER — E-ADVICE (OUTPATIENT)
Dept: INTERNAL MEDICINE | Age: 35
End: 2025-04-23

## 2025-04-23 DIAGNOSIS — R22.41 LUMP OF THIGH, RIGHT: Primary | ICD-10-CM

## 2025-04-24 ENCOUNTER — TELEPHONE (OUTPATIENT)
Dept: PHARMACY | Facility: CLINIC | Age: 35
End: 2025-04-24
Payer: COMMERCIAL

## 2025-04-24 ENCOUNTER — OFFICE VISIT (OUTPATIENT)
Dept: INTERNAL MEDICINE | Age: 35
End: 2025-04-24

## 2025-04-24 VITALS
BODY MASS INDEX: 51.91 KG/M2 | HEART RATE: 118 BPM | DIASTOLIC BLOOD PRESSURE: 76 MMHG | SYSTOLIC BLOOD PRESSURE: 114 MMHG | WEIGHT: 293 LBS | HEIGHT: 63 IN | OXYGEN SATURATION: 99 %

## 2025-04-24 DIAGNOSIS — E66.813 CLASS 3 SEVERE OBESITY DUE TO EXCESS CALORIES WITH SERIOUS COMORBIDITY AND BODY MASS INDEX (BMI) OF 50.0 TO 59.9 IN ADULT (H): ICD-10-CM

## 2025-04-24 DIAGNOSIS — K76.0 FATTY LIVER: ICD-10-CM

## 2025-04-24 DIAGNOSIS — R22.41 LUMP OF RIGHT THIGH: Primary | ICD-10-CM

## 2025-04-24 PROCEDURE — 99212 OFFICE O/P EST SF 10 MIN: CPT | Performed by: STUDENT IN AN ORGANIZED HEALTH CARE EDUCATION/TRAINING PROGRAM

## 2025-04-24 RX ORDER — METFORMIN HYDROCHLORIDE 500 MG/1
1000 TABLET, EXTENDED RELEASE ORAL
Qty: 60 TABLET | Refills: 2 | Status: SHIPPED | OUTPATIENT
Start: 2025-04-24

## 2025-04-24 RX ORDER — NALTREXONE HYDROCHLORIDE 50 MG/1
50 TABLET, FILM COATED ORAL DAILY
Qty: 30 TABLET | Refills: 2 | Status: SHIPPED | OUTPATIENT
Start: 2025-04-24

## 2025-04-24 NOTE — TELEPHONE ENCOUNTER
Refilled naltrexone and metformin. Clarified other meds in pill packs:     Vitamin D 50,000 units once weekly   Clozapine 50 mg in the morning, 300 mg at bedtime   Clomipramine 75 mg once daily   Atomoxetine 80 mg once daily   Propranolol 20 mg 2 times daily     Floresita Salinas, PharmD, AAHIVP  Medication Therapy Management Pharmacist

## 2025-04-24 NOTE — TELEPHONE ENCOUNTER
Maria Isabel Douglas from [facility] called and is trying to get refills on her medication(s) naltrexone and metformin. Their fax number is [phone] and their phone number is [phone]    Thank you       Bhavik Donaldson MT

## 2025-04-25 ENCOUNTER — E-ADVICE (OUTPATIENT)
Dept: INTERNAL MEDICINE | Age: 35
End: 2025-04-25

## 2025-04-25 ENCOUNTER — TELEPHONE (OUTPATIENT)
Dept: SURGERY | Age: 35
End: 2025-04-25

## 2025-04-26 ENCOUNTER — HEALTH MAINTENANCE LETTER (OUTPATIENT)
Age: 35
End: 2025-04-26

## 2025-05-08 ENCOUNTER — BEHAVIORAL HEALTH (OUTPATIENT)
Dept: BEHAVIORAL HEALTH | Age: 35
End: 2025-05-08

## 2025-05-08 DIAGNOSIS — F42.2 MIXED OBSESSIONAL THOUGHTS AND ACTS: ICD-10-CM

## 2025-05-08 DIAGNOSIS — F41.1 GAD (GENERALIZED ANXIETY DISORDER): ICD-10-CM

## 2025-05-08 DIAGNOSIS — F31.9 BIPOLAR 1 DISORDER  (CMD): Primary | ICD-10-CM

## 2025-05-08 DIAGNOSIS — F90.0 ATTENTION DEFICIT HYPERACTIVITY DISORDER (ADHD), PREDOMINANTLY INATTENTIVE TYPE: ICD-10-CM

## 2025-05-12 ENCOUNTER — MYC MEDICAL ADVICE (OUTPATIENT)
Dept: PHARMACY | Facility: CLINIC | Age: 35
End: 2025-05-12
Payer: COMMERCIAL

## 2025-05-12 ENCOUNTER — VIRTUAL VISIT (OUTPATIENT)
Dept: PHARMACY | Facility: CLINIC | Age: 35
End: 2025-05-12
Attending: NURSE PRACTITIONER
Payer: COMMERCIAL

## 2025-05-12 ENCOUNTER — TELEPHONE (OUTPATIENT)
Dept: BEHAVIORAL HEALTH | Age: 35
End: 2025-05-12

## 2025-05-12 VITALS — HEIGHT: 63 IN | BODY MASS INDEX: 51.91 KG/M2 | WEIGHT: 293 LBS

## 2025-05-12 DIAGNOSIS — F90.0 ATTENTION DEFICIT HYPERACTIVITY DISORDER (ADHD), PREDOMINANTLY INATTENTIVE TYPE: ICD-10-CM

## 2025-05-12 DIAGNOSIS — R73.03 PREDIABETES: ICD-10-CM

## 2025-05-12 DIAGNOSIS — F41.1 GAD (GENERALIZED ANXIETY DISORDER): ICD-10-CM

## 2025-05-12 DIAGNOSIS — K76.0 FATTY LIVER: ICD-10-CM

## 2025-05-12 DIAGNOSIS — F31.9 BIPOLAR 1 DISORDER (H): ICD-10-CM

## 2025-05-12 DIAGNOSIS — E66.813 CLASS 3 SEVERE OBESITY DUE TO EXCESS CALORIES WITH SERIOUS COMORBIDITY AND BODY MASS INDEX (BMI) OF 50.0 TO 59.9 IN ADULT (H): Primary | ICD-10-CM

## 2025-05-12 RX ORDER — NALTREXONE HYDROCHLORIDE 50 MG/1
25 TABLET, FILM COATED ORAL 2 TIMES DAILY
Qty: 30 TABLET | Refills: 2 | Status: SHIPPED | OUTPATIENT
Start: 2025-05-12

## 2025-05-12 ASSESSMENT — PAIN SCALES - GENERAL: PAINLEVEL_OUTOF10: NO PAIN (0)

## 2025-05-12 NOTE — PROGRESS NOTES
Medication Therapy Management (MTM) Encounter    ASSESSMENT:                            Medication Adherence/Access: follow-up on adherence to iron again next visit    Weight Management   /NAFLD/pre-diabetes:  Weight has increased 25 lbs since stopping Zepbound 3 months ago. Weight has increased 10 lbs in the last month even after re-starting metformin and naltrexone.     Weight Loss Medication Options:   Phentermine or other stimulant: avoid due to bipolar history  Topiramate: may be helpful for snacking and cravings at night. Need to clear with psychiatry due to risk for worsening mood. On birth control. Drug interactions with clomipramine and clozapine (Use caution if topiramate is coadministered with agents with clinically relevant anticholinergic effects, due to the potential for increased risk of oligohidrosis and hyperthermia- symptoms to monitor for are confusion, dizziness, nausea, rapid heartbeat). Risk for increased CNS depression with hydroxyzine. Discussed need to increase water intake to reduce risk for kidney stones.  Qsymia: no, see above  Contrave: could consider increasing bupropion and naltrexone doses since it's partially helpful for cravings. Need to discuss with psych if bupropion dose could be increased. Bupropion increases concentrations of atomoxetine,clozapine, and propranolol. Could try splitting naltrexone in half and taking 2 times daily to see if it's more helpful for cravings in the evening. If it's helpful, could taper naltrexone up to 50 mg 2 times daily if tolerated. Monitor liver function if dose is increased.   GLP1 agonist: no coverage, cash option too expensive.  Metformin: tolerating at current dose. Could consider increasing dose to 1500 mg/day in the future.     Could try optimizing bupropion/naltrexone dosing vs. Add topiramate. Will try adjusting naltrexone dosing first and contact psychiatry to see if any plans to increase bupropion dose. Will discuss potential of adding  "topiramate with psych as well.     Bipolar/anxiety/ADHD:   Clozapine 300 mg once daily   Clomipramine 2    PLAN:                            Try taking naltrexone 1/2 tablet 2 times daily instead of 1 tablet once daily to see if it helps reduce cravings in the evening.   If this is helpful, can try increasing the dose to 50 mg 2 times daily   Will contact psychiatrist to see if increasing bupropion dose or starting topiramate would be a preferred next step  Work on increasing water intake - goal at least 64 ounces daily     Follow-up: 1 month with medication therapy management, 2 months with Juliet Emanuel PA-C    SUBJECTIVE/OBJECTIVE:                          DERICK Upton is a 34 year old female seen for a follow-up visit.       Reason for visit: weight management follow-up    Allergies/ADRs: Reviewed in chart  Past Medical History: Reviewed in chart  Tobacco: She reports that she has never smoked. She has never used smokeless tobacco.  Alcohol: not currently using  [employer] insurance- Lives in [place]  Works at [employer]  Medication Adherence/Access:   Checked Zepbound coverage through [place] state insurance but it came back saying she doesn't have pharmacy coverage under state plan. She thought she had prescription coverage.  Meds in blister packs through [employer] and then has another pill box. Iron is not getting blister packed; struggling with adherence to this as a result.     Weight Management /NAFLD/pre-diabetes:  Metformin ER 1 g once daily - takes at night   Bupropion 150 mg once daily - prescribed through psychiatry   Naltrexone 50 mg once daily - takes in the morning     Has primary care provider in [place]. Following with Juliet Emanuel PA-C for weight management clinic. Feels like naltrexone has been helpful for reducing cravings for sweets during the day. Still has sweets cravings before bed. Not drinking very much water. No medication side effects.     She's working on eating \"better\" and has goal " "to just eat out once per week. Has been bringing protein drinks to work and has one per day. Plans to start working at [employer] instead of [employer] again which she thinks will help to get into a better routine (consistent schedule) and not be around food/drinks at work. Thinks she needs to stop having snacks in her room. Plans to start drinking 1/2 gallon of water daily.     Tried/Failed/Contraindicated Medications:   Metformin: diarrhea. Currently tolerating.   Wegovy: tolerated okay but zepbound works better   Zepbound: stopped 2/2025 due to not having insurance coverage. This was effective.     No hx pancreatitis/thyroid cancer  No history of kidney stones. Mood is stable.   On clozapine which is associated with a significant amount of weight gain.     Initial Consult Weight: 309.5 lbs  A1c: 5.3% - 12/29/23 per care everywhere     Current weight today: 310 lbs 0 oz  Cumulative Weight Gain: 0.5 lb    Wt Readings from Last 4 Encounters:   05/12/25 (!) 310 lb (140.6 kg)   04/07/25 300 lb (136.1 kg)   02/12/25 290 lb (131.5 kg)   01/07/25 285 lb (129.3 kg)     Estimated body mass index is 55.8 kg/m  as calculated from the following:    Height as of this encounter: 5' 2.5\" (1.588 m).    Weight as of this encounter: 310 lb (140.6 kg).    Bipolar/anxiety/ADHD:   Clozapine 300 mg once daily   Clomipramine 25 mg daily   Atomoxetine 80 mg once daily   Bupropion 150 mg once daily    Propranolol 20 mg 2 times daily   Trazodone 50 mg at bedtime as needed   Hydroxyzine 25-50 mg 3 times daily as needed for panic attacks    Reports that mood and anxiety is stable. Denies constipation or other side effects. Follows with psychiatry and therapist. Follows with Salina Ortiz NP at [facility], [facility] [address] [facility] DANICA Jimenez. Fax: [phone]. Phone: [phone].    Today's Vitals: Ht 5' 2.5\" (1.588 m)   Wt (!) 310 lb (140.6 kg)   BMI 55.80 kg/m    ----------------    I spent 24 minutes with this patient today. All " changes were made via collaborative practice agreement with Juliet Emanuel PA-C    A summary of these recommendations was sent via Writer[employer]ly.      Telemedicine Visit Details  The patient's medications can be safely assessed via a telemedicine encounter.  Type of service:  Telephone visit  Originating Location (pt. Location): Home    Distant Location (provider location):  Off-site  Start Time: 8:04 am  End Time: 8:28 AM     Medication Therapy Recommendations  Class 3 severe obesity due to excess calories with serious comorbidity and body mass index (BMI) of 50.0 to 59.9 in adult (H)   1 Current Medication: metFORMIN (GLUCOPHAGE XR) 500 MG 24 hr tablet (Discontinued)   Current Medication Si tablet by mouth daily with meal for 1 week, then 2 tablets daily with meal.   Rationale: Patient forgets to take - Adherence - Adherence   Recommendation: Provide Education   Status: Patient Agreed - Adherence/Education   Identified Date: 2025 Completed Date: 2025         2 Current Medication: naltrexone (DEPADE/REVIA) 50 MG tablet   Current Medication Sig: Take 1 tablet (50 mg) by mouth daily.   Rationale: Frequency inappropriate - Dosage too low - Effectiveness   Recommendation: Increase Frequency   Status: Patient Agreed - Adherence/Education   Identified Date: 2025 Completed Date: 2025         3 Current Medication: naltrexone (DEPADE/REVIA) 50 MG tablet   Current Medication Sig: Take 1 tablet (50 mg) by mouth daily.   Rationale: Synergistic therapy - Needs additional medication therapy - Indication   Recommendation: Start Medication - topiramate 25 MG tablet   Status: Contact Provider - Awaiting Response   Identified Date: 2025

## 2025-05-12 NOTE — PROGRESS NOTES
Spoke with RN at psychiatry office. Will need patient to sign release of information and fax to their clinic before they can respond with question on increasing bupropion dose and/or starting topiramate.     Follows with Salina Ortiz NP at [facility], [address] [place] [address] [address]. [place], [place]. Fax: [phone]. Phone: [phone].    Floresita Salinas, PharmD, AAHIVP  Medication Therapy Management Pharmacist

## 2025-05-12 NOTE — Clinical Note
Hi- would someone be able to contact patient to get a release of information so her psychiatrist can release records to us? Please let me know if or I should reach out to someone else. Salina Ortiz NP at [facility], [facility] [address] [facility] [address] [place], [place]. Fax: [phone]. Phone: [phone]. Thank you! - Floresita

## 2025-05-12 NOTE — NURSING NOTE
Current patient location: [address] [place] [id number]    Is the patient currently in the state of [place]? NO in [place]    Visit mode: TELEPHONE    If the visit is dropped, the patient can be reconnected by:TELEPHONE VISIT: Phone number:   Telephone Information:   Mobile [phone]       Will anyone else be joining the visit? NO  (If patient encounters technical issues they should call [phone]    Are changes needed to the allergy or medication list? No    Are refills needed on medications prescribed by this physician? NO    Rooming Documentation:  Questionnaire(s) not pre-assigned    Reason for visit: Medication Therapy Management    Muriel RUSSO

## 2025-05-12 NOTE — PATIENT INSTRUCTIONS
"Recommendations from today's MTM visit:                                                      Try taking naltrexone 1/2 tablet 2 times daily instead of 1 tablet once daily to see if it helps reduce cravings in the evening.   If this is helpful, can try increasing the dose to 50 mg 2 times daily   Will contact psychiatrist to see if increasing bupropion dose or starting topiramate would be a preferred next step  Work on increasing water intake - goal at least 64 ounces daily     Follow-up:   Appointments in Next Year      Jun 09, 2025 9:00 AM  (Arrive by 8:45 AM)  Return Weight Management Nutrition with Nazanin Collins RD  [facility] [place] ([facility] [place] ) [phone]     Jun 09, 2025 9:30 AM  Pharmacist Visit with Floresita Salinas RPH  [facility] ([facility] ) [phone]     Jul 01, 2025 12:30 PM  (Arrive by 12:15 PM)  Return Weight Management Visit with Juliet Emanuel PA-C  [facility] [place] ([facility] [place] ) [phone]            It was great speaking with you today.  I value your experience and would be very thankful for your time in providing feedback in our clinic survey. In the next few days, you may receive an email or text message from [employer] with a link to a survey related to your  clinical pharmacist.\"     To schedule another MTM appointment, please call the clinic directly or you may call the MTM scheduling line at [phone].    My Clinical Pharmacist's contact information:                                                      Please feel free to contact me with any questions or concerns you have.      Floresita Salinas, PharmD, AAHIVP  Medication Therapy Management Pharmacist      "

## 2025-05-22 NOTE — PROGRESS NOTES
"Video-Visit Details    Type of service:  Video Visit    Video Start Time: 8:52 am  Video End Time: 9:06 am    Originating Location (pt. Location): Home    Distant Location (provider location):  Offsite (providers home) [facility] [place]     Platform used for Video Visit: Unbound      Weight Management Nutrition Consultation    Ben Upton is a 34 year old female presents today for return weight management nutrition consultation.  Patient referred by Juliet Emanuel PA-C on 2024.    Patient with Co-morbidities of obesity includin/24/2024     6:38 PM   --   I have the following health issues associated with obesity Fatty Liver    Asthma   I have the following symptoms associated with obesity Depression    Irregular Menstral Cycle         Anthropometrics:  Started Wegovy at a weight of 310 lbs.     Wt Readings from Last 10 Encounters:   25 (!) 142.9 kg (315 lb)   25 (!) 140.6 kg (310 lb)   25 136.1 kg (300 lb)   25 131.5 kg (290 lb)   25 129.3 kg (285 lb)   24 130.2 kg (287 lb)   24 130.2 kg (287 lb)   24 130.2 kg (287 lb)   24 131.5 kg (290 lb)   24 131.5 kg (290 lb)      Estimated body mass index is 56.7 kg/m  as calculated from the following:    Height as of this encounter: 1.588 m (5' 2.5\").    Weight as of this encounter: 142.9 kg (315 lb).     Current weight: 315 lbs   - +15 lbs since last RD    Medications for Weight Loss:  Zepbound - Had to stop due to insurance  Will be continuing naltrexone and adding metformin/bupropion     NUTRITION HISTORY  Food allergies: None  Food intolerances: Dairy  Vitamin/Mineral Supplements: Once weekly vitamin D.   - Vit D insufficiency and iron def anemia, recommended to add iron to blister pack per Floresita Salinas, Pharm D.     More recently has been focusing on 1500 doris/day diet, healthier food choices, bought a bike.   Waking around 7:45 am   Strong cravings " for ice cream   Lives with Mom, who cooks a couple meals per week, otherwise she is cooking her own.    Preferences: enjoys variety of foods     8/13/24 - she is planning to increase to 7.5 mg Zepbound. Working on not dining out, planning/prepping more at home. Stocked up on healthy snacks options - turkey sticks, pistachios, fast-breaks; Quest chips;   Less sweets. Went to the [employer], anticipated she was going to get candy there but ended up consciously deciding not to purchase.    9/30/24 - Stop nutritional tracking for a bit but restarted after weight loss slowed. [facility], keeping intake to ~1500 doris/d. [place] like she got of track during week of her bday. Doing really well with protein intake. Utilizing protein shake and bar for breakfast replacement. May utilize another protein shake as snack. Doing well with lean protein and veggies at other meals.       Dec 2024 - Just start 12.5 mg Zepbound.  First couple days after injection, feels nauseous after eating. Will lose coverage in Jan 2025. Continues to have challenge with snacking. Recently started on naltrexone and Wellbutrin.    Recent Diet Recall:  Breakfast: Protein bar or shake (Premier Protein)  Lunch: adult lunchable (13 gm pro, 300 doris) and veggie tray on days she works (14 gm protein)    Dinner: Taco dip and tortilla chips; roasted veggies and pre-grilled chicken.   Snacks:   Beverages: Crystal light/water - half gallon yeti daily; sugar-free body armor and lemonade once daily. diet soda once weekly.   Alcohol: None  Dining-out: cut [employer] down to once monthly.     Physical Activity:  Bowling once weekly  Has a gym membership. Would like to start walking at the gym more.     Feb 2025:  Had to stop Zepbound due to insurance. Last shot was 2 days ago. Met with Floresita Salinas, Pharm D, and developed a plan. (See medication section for plan)    Not tracking intake currently but wants to again.  Eating out more recently. Meal  examples: burger, chicken sandwich  Found a new protein bar for breakfast - fitcrunch, really enjoying them. Low sugar.   Lunch - protein shake paired with something   Dinner meal is hardest - eating at work.    Getting a new job in 2 weeks - [employer].     Hydration: about 1/2 gallon of water/day, occ sugar-free lemonade,  sugar-free gatorade, diet soda or Wyatt cruz but rarely    April 2025:  Off of Zepbound now due to insurance. More cravings since being off - sweets. Wants to work on snacking. Trying to find healthier options - lower sugar mini muffins, Rx bars. Also bought some high protein atkins meals.    B - protein bar OR sandwich at [facility]   L - at [facility], choices vary. Often getting chicken pocket (200 kcal)   D - varies, last night had protein box from work. Night before had veggie tray with potato/veggies (220 kcal) and added some brat burger to it. Otherwise having the higher protein atkins meal    Cousin passed last week - heart issue. Trying to be more mindful of her eating due to this.     June 2025:    Typical day  - Premier protein shake  - yogurt smoothies with 20 g protein (out of currently - working lots so hasn't made it to the store)   - protein and vegetable meal made at home OR out to eat maybe 1x/week (eating out less currently)     Trying to eat healthier snacks as well such as peanut butter packet on whole wheat crackers.    Progress Towards Previous Goals:  Aim for 20-30+ gm protein at each eating encounter (at least 75 gm protein daily) - Likely meeting or close to meeting. Doing a protein shake with 30 g and a yogurt smoothie with 20 g. Also having a dinner meal with meat. Likely getting 70-80 g minmum/day   Aim to start tracking in [facility] again. Goal of 3063-3648 kcal/day - Try to track right after eating - Not tracking currently but wants to keep this goal going forward.   Aim to take vitamin D and iron consistently - Not currently. No barriers noted aside from  "misplacing the iron. Plans to clean her room today.   Aim to limit self to 1 Frappe per week - No longer working at [employer] so this is easy. Has only had 1 in the past month.     Additional information:  Works at [employer]    Nutrition Prescription  Recommended energy/nutrient modification.    Nutrition Diagnosis  Obesity r/t long history of positive energy balance aeb BMI >30. - Ongoing    Nutrition Intervention  Materials/education provided on hypocaloric diet for weight loss. Discussed following calorie goal or [id number] doris/day for an estimated 1-2 lb wt loss per week.   Reviewed progress towards previous goals. Praised pt on the positive change she has sustained.   Reviewed strategies for increasing physical activity, with emphasis on strengthening.   Reviewed nutrition-related labs and supplementation for repletion.   Patient demonstrates understanding.  Co-developed goals to work towards.   Provided pt with list of goals and resources below via [email or web address].     Expected Engagement: good    Nutrition Goals  Aim for 20-30+ gm protein at each eating encounter (at least 75 gm protein daily)    Aim to start [facility] in [facility]. Goal of 7459-9318 kcal/day - Try to track right after eating    Example Meal Plan:  Breakfast: breakfast bar + 2 string cheese  Lunch: lunchable/tuna pack + shake and mini veggie tray   Afternoon-snack: protein shake;   Dinner: palm-size meat/fish + 1/2 cup carb and 2 cup veggies     Quick/Easy Protein Sources:  Hard boiled eggs  Part-skim cheese sticks  Baby Bell cheese rounds  Low-fat/low-sugar Greek yogurt  Low-fat cottage cheese  Lean deli meat (chicken/turkey/ham)  Roasted chickpeas or lentils  Nuts   Turkey meat stick  Protein shake/bar  \"P3\" snack (cheese, nuts, deli meat)  Aldi's \"Protein Bread\"   \"Egglife\" egg white wrap    Tuna/salmon can/packet     The Plate Method  [email or web address]    Protein Sources   [email or web address] "     Carbohydrates  [email or web address]     Mindful Eating  [email or web address]     Summary of Volumetrics Eating Plan  [email or web address]       Follow-Up:  Monday, August 11th at 9:00 am    Time spent with patient: 14 minutes.  ADELAIDA Arceo, RD, LD  Clinic #: [phone]

## 2025-06-02 ENCOUNTER — APPOINTMENT (OUTPATIENT)
Dept: BEHAVIORAL HEALTH | Age: 35
End: 2025-06-02

## 2025-06-02 ENCOUNTER — TELEPHONE (OUTPATIENT)
Dept: INTERNAL MEDICINE | Age: 35
End: 2025-06-02

## 2025-06-02 ENCOUNTER — HOSPITAL ENCOUNTER (OUTPATIENT)
Age: 35
Discharge: HOME | End: 2025-06-02
Attending: INTERNAL MEDICINE | Admitting: INTERNAL MEDICINE

## 2025-06-02 VITALS
SYSTOLIC BLOOD PRESSURE: 139 MMHG | HEIGHT: 62 IN | TEMPERATURE: 98 F | RESPIRATION RATE: 16 BRPM | HEART RATE: 100 BPM | DIASTOLIC BLOOD PRESSURE: 86 MMHG | WEIGHT: 293 LBS | OXYGEN SATURATION: 97 % | BODY MASS INDEX: 53.92 KG/M2

## 2025-06-02 DIAGNOSIS — K76.0 FATTY LIVER: Primary | ICD-10-CM

## 2025-06-02 DIAGNOSIS — F31.9 BIPOLAR 1 DISORDER  (CMD): Primary | ICD-10-CM

## 2025-06-02 DIAGNOSIS — F90.0 ATTENTION DEFICIT HYPERACTIVITY DISORDER (ADHD), PREDOMINANTLY INATTENTIVE TYPE: ICD-10-CM

## 2025-06-02 DIAGNOSIS — F41.1 GAD (GENERALIZED ANXIETY DISORDER): ICD-10-CM

## 2025-06-02 DIAGNOSIS — F42.2 MIXED OBSESSIONAL THOUGHTS AND ACTS: ICD-10-CM

## 2025-06-02 PROCEDURE — 10004348 HB COUNTER-EVAL PRE-OP

## 2025-06-02 PROCEDURE — 90832 PSYTX W PT 30 MINUTES: CPT | Performed by: COUNSELOR

## 2025-06-02 PROCEDURE — 91200 LIVER ELASTOGRAPHY: CPT | Performed by: NURSE PRACTITIONER

## 2025-06-09 ENCOUNTER — VIRTUAL VISIT (OUTPATIENT)
Dept: ENDOCRINOLOGY | Facility: CLINIC | Age: 35
End: 2025-06-09
Payer: COMMERCIAL

## 2025-06-09 ENCOUNTER — VIRTUAL VISIT (OUTPATIENT)
Dept: PHARMACY | Facility: CLINIC | Age: 35
End: 2025-06-09
Attending: NURSE PRACTITIONER
Payer: COMMERCIAL

## 2025-06-09 VITALS — WEIGHT: 293 LBS | BODY MASS INDEX: 51.91 KG/M2 | HEIGHT: 63 IN

## 2025-06-09 DIAGNOSIS — K76.0 FATTY LIVER: ICD-10-CM

## 2025-06-09 DIAGNOSIS — R73.03 PREDIABETES: ICD-10-CM

## 2025-06-09 DIAGNOSIS — E66.813 CLASS 3 SEVERE OBESITY DUE TO EXCESS CALORIES WITH SERIOUS COMORBIDITY AND BODY MASS INDEX (BMI) OF 50.0 TO 59.9 IN ADULT (H): Primary | ICD-10-CM

## 2025-06-09 DIAGNOSIS — Z71.3 NUTRITIONAL COUNSELING: Primary | ICD-10-CM

## 2025-06-09 DIAGNOSIS — E66.813 CLASS 3 SEVERE OBESITY WITH SERIOUS COMORBIDITY AND BODY MASS INDEX (BMI) OF 50.0 TO 59.9 IN ADULT, UNSPECIFIED OBESITY TYPE (H): ICD-10-CM

## 2025-06-09 DIAGNOSIS — D50.9 IRON DEFICIENCY ANEMIA, UNSPECIFIED IRON DEFICIENCY ANEMIA TYPE: ICD-10-CM

## 2025-06-09 PROCEDURE — 97803 MED NUTRITION INDIV SUBSEQ: CPT | Mod: 95 | Performed by: DIETITIAN, REGISTERED

## 2025-06-09 PROCEDURE — 1126F AMNT PAIN NOTED NONE PRSNT: CPT | Mod: 95 | Performed by: DIETITIAN, REGISTERED

## 2025-06-09 PROCEDURE — 99207 PR NO CHARGE LOS: CPT | Mod: 95 | Performed by: DIETITIAN, REGISTERED

## 2025-06-09 RX ORDER — TOPIRAMATE 25 MG/1
TABLET, FILM COATED ORAL
Qty: 90 TABLET | Refills: 2 | Status: SHIPPED | OUTPATIENT
Start: 2025-06-09

## 2025-06-09 ASSESSMENT — PAIN SCALES - GENERAL
PAINLEVEL_OUTOF10: NO PAIN (0)
PAINLEVEL_OUTOF10: NO PAIN (0)

## 2025-06-09 NOTE — LETTER
"2025       RE: Ben Upton  [address] [facility] [address]     Dear Colleague,    Thank you for referring your patient, Ben Upton, to the [facility] [place] at [facility]. Please see a copy of my visit note below.    Video-Visit Details    Type of service:  Video Visit    Video Start Time: 8:52 am  Video End Time: 9:06 am    Originating Location (pt. Location): Home    Distant Location (provider location):  Offsite (providers home) [facility] [place]     Platform used for Video Visit: [employer]      Weight Management Nutrition Consultation    Ben Upton is a 34 year old female presents today for return weight management nutrition consultation.  Patient referred by Juliet Emanuel PA-C on 2024.    Patient with Co-morbidities of obesity includin/24/2024     6:38 PM   --   I have the following health issues associated with obesity Fatty Liver    Asthma   I have the following symptoms associated with obesity Depression    Irregular Menstral Cycle         Anthropometrics:  Started Wegovy at a weight of 310 lbs.     Wt Readings from Last 10 Encounters:   25 (!) 142.9 kg (315 lb)   25 (!) 140.6 kg (310 lb)   25 136.1 kg (300 lb)   25 131.5 kg (290 lb)   25 129.3 kg (285 lb)   24 130.2 kg (287 lb)   24 130.2 kg (287 lb)   24 130.2 kg (287 lb)   24 131.5 kg (290 lb)   24 131.5 kg (290 lb)      Estimated body mass index is 56.7 kg/m  as calculated from the following:    Height as of this encounter: 1.588 m (5' 2.5\").    Weight as of this encounter: 142.9 kg (315 lb).     Current weight: 315 lbs   - +15 lbs since last RD    Medications for Weight Loss:  Zepbound - Had to stop due to insurance  Will be continuing naltrexone and adding metformin/bupropion     NUTRITION HISTORY  Food allergies: " None  Food intolerances: Dairy  Vitamin/Mineral Supplements: Once weekly vitamin D.   - Vit D insufficiency and iron def anemia, recommended to add iron to blister pack per Floresita Salinas, Pharm D.     More recently has been focusing on 1500 doris/day diet, healthier food choices, bought a bike.   Waking around 7:45 am   Strong cravings for ice cream   Lives with Mom, who cooks a couple meals per week, otherwise she is cooking her own.    Preferences: enjoys variety of foods     8/13/24 - she is planning to increase to 7.5 mg Zepbound. Working on not dining out, planning/prepping more at home. Stocked up on healthy snacks options - turkey sticks, pistachios, fast-breaks; Quest chips;   Less sweets. Went to the [employer], anticipated she was going to get candy there but ended up consciously deciding not to purchase.    9/30/24 - Stop nutritional tracking for a bit but restarted after weight loss slowed. Tracking in [facility], keeping intake to ~1500 doris/d. [place] like she got of track during week of her bday. Doing really well with protein intake. Utilizing protein shake and bar for breakfast replacement. May utilize another protein shake as snack. Doing well with lean protein and veggies at other meals.       Dec 2024 - Just start 12.5 mg Zepbound.  First couple days after injection, feels nauseous after eating. Will lose coverage in Jan 2025. Continues to have challenge with snacking. Recently started on naltrexone and Wellbutrin.    Recent Diet Recall:  Breakfast: Protein bar or shake (Premier Protein)  Lunch: adult lunchable (13 gm pro, 300 doris) and veggie tray on days she works (14 gm protein)    Dinner: Taco dip and tortilla chips; roasted veggies and pre-grilled chicken.   Snacks:   Beverages: Crystal light/water - half gallon yeti daily; sugar-free body armor and lemonade once daily. diet soda once weekly.   Alcohol: None  Dining-out: cut Culvers down to once monthly.     Physical Activity:  Bowling once  weekly  Has a gym membership. Would like to start walking at the gym more.     Feb 2025:  Had to stop Zepbound due to insurance. Last shot was 2 days ago. Met with Floresita Salinas, Pharm D, and developed a plan. (See medication section for plan)    Not tracking intake currently but wants to again.  Eating out more recently. Meal examples: burger, chicken sandwich  Found a new protein bar for breakfast - fitcrunch, really enjoying them. Low sugar.   Lunch - protein shake paired with something   Dinner meal is hardest - eating at work.    Getting a new job in 2 weeks - [employer].     Hydration: about 1/2 gallon of water/day, occ sugar-free lemonade,  sugar-free gatorade, diet soda or Wyatt cruz but rarely    April 2025:  Off of Zepbound now due to insurance. More cravings since being off - sweets. Wants to work on snacking. Trying to find healthier options - lower sugar mini muffins, Rx bars. Also bought some high protein atkins meals.    B - protein bar OR sandwich at [employer]   L - at [facility], choices vary. Often getting chicken pocket (200 kcal)   D - varies, last night had protein box from work. Night before had veggie tray with potato/veggies (220 kcal) and added some brat burger to it. Otherwise having the higher protein atkins meal    Cousin passed last week - heart issue. Trying to be more mindful of her eating due to this.     June 2025:    Typical day  - Premier protein shake  - yogurt smoothies with 20 g protein (out of currently - working lots so hasn't made it to the store)   - protein and vegetable meal made at home OR out to eat maybe 1x/week (eating out less currently)     Trying to eat healthier snacks as well such as peanut butter packet on whole wheat crackers.    Progress Towards Previous Goals:  Aim for 20-30+ gm protein at each eating encounter (at least 75 gm protein daily) - Likely meeting or close to meeting. Doing a protein shake with 30 g and a yogurt smoothie with 20 g. Also having a  dinner meal with meat. Likely getting 70-80 g minmum/day   Aim to start tracking in [facility] again. Goal of 8170-3330 kcal/day - Try to track right after eating - Not tracking currently but wants to keep this goal going forward.   Aim to take vitamin D and iron consistently - Not currently. No barriers noted aside from misplacing the iron. Plans to clean her room today.   Aim to limit self to 1 Frappe per week - No longer working at [employer] so this is easy. Has only had 1 in the past month.     Additional information:  Works at [employer]    Nutrition Prescription  Recommended energy/nutrient modification.    Nutrition Diagnosis  Obesity r/t long history of positive energy balance aeb BMI >30. - Ongoing    Nutrition Intervention  Materials/education provided on hypocaloric diet for weight loss. Discussed following calorie goal or 3481-8727 doris/day for an estimated 1-2 lb wt loss per week.   Reviewed progress towards previous goals. Praised pt on the positive change she has sustained.   Reviewed strategies for increasing physical activity, with emphasis on strengthening.   Reviewed nutrition-related labs and supplementation for repletion.   Patient demonstrates understanding.  Co-developed goals to work towards.   Provided pt with list of goals and resources below via [employer].     Expected Engagement: good    Nutrition Goals  Aim for 20-30+ gm protein at each eating encounter (at least 75 gm protein daily)    Aim to start tracking in [facility] again. Goal of 3271-8292 kcal/day - Try to track right after eating    Example Meal Plan:  Breakfast: breakfast bar + 2 string cheese  Lunch: lunchable/tuna pack + shake and mini veggie tray   Afternoon-snack: protein shake;   Dinner: palm-size meat/fish + 1/2 cup carb and 2 cup veggies     Quick/Easy Protein Sources:  Hard boiled eggs  Part-skim cheese sticks  Baby Bell cheese rounds  Low-fat/low-sugar Greek yogurt  Low-fat cottage cheese  Lean deli meat  "(chicken/turkey/ham)  Roasted chickpeas or lentils  Nuts   Turkey meat stick  Protein shake/bar  \"P3\" snack (cheese, nuts, deli meat)  [employer]'s \"Protein Bread\"   \"Egglife\" egg white wrap    Tuna/salmon can/packet     The Plate Method  [email or web address]    Protein Sources   [email or web address]     Carbohydrates  [email or web address]     Mindful Eating  [email or web address]     Summary of Volumetrics Eating Plan  [email or web address]       Follow-Up:  Monday, August 11th at 9:00 am    Time spent with patient: 14 minutes.  ADELAIDA Arceo, RD, LD  Clinic #: [phone]          Again, thank you for allowing me to participate in the care of your patient.      Sincerely,    Nazanin Collins RD    "

## 2025-06-09 NOTE — PATIENT INSTRUCTIONS
"Nutrition Goals  Aim for 20-30+ gm protein at each eating encounter (at least 75 gm protein daily)    Aim to start tracking in [facility] again. Goal of 4054-9750 kcal/day - Try to track right after eating    Example Meal Plan:  Breakfast: breakfast bar + 2 string cheese  Lunch: lunchable/tuna pack + shake and mini veggie tray   Afternoon-snack: protein shake;   Dinner: palm-size meat/fish + 1/2 cup carb and 2 cup veggies     Quick/Easy Protein Sources:  Hard boiled eggs  Part-skim cheese sticks  Baby Bell cheese rounds  Low-fat/low-sugar Greek yogurt  Low-fat cottage cheese  Lean deli meat (chicken/turkey/ham)  Roasted chickpeas or lentils  Nuts   Turkey meat stick  Protein shake/bar  \"P3\" snack (cheese, nuts, deli meat)  Aldi's \"Protein Bread\"   \"Egglife\" egg white wrap    Tuna/salmon can/packet     The Plate Method  [email or web address]    Protein Sources   [email or web address]     Carbohydrates  [email or web address]     Mindful Eating  [email or web address]     Summary of Volumetrics Eating Plan  [email or web address]       Follow-Up:  Monday, August 11th at 9:00 am    ADELAIDA Arceo, RD, LD  [facility] #: [phone]    "

## 2025-06-09 NOTE — PROGRESS NOTES
Medication Therapy Management (MTM) Encounter    ASSESSMENT:                            Medication Adherence/Access: No issues identified.    Weight Management   /NAFLD/pre-diabetes:  Weight has increased 5 lbs since last visit. She's getting some benefit from naltrexone but it's not helping with weight loss. May benefit from topiramate to help reduce snacking. Discussed need for two forms of birth control if she is sexually active (already on nexplanon). Will plan to start since psychiatrist is okay with it. Discussed side effects to monitor for. Will stop naltrexone to reduce pill burden. Discussed calling [place] medicaid to see if she has the option to add prescription coverage. If she has prescription coverage, we can check coverage for Zepbound again.      Iron deficiency anemia:   Hemoglobin and iron labs are still low. Would benefit from asking primary care provider to send prescription for iron to include in pill box to help with adherence.     PLAN:                            Start Topiramate 25 mg tablet: 1 tablet (25 mg) by mouth daily for 1 week, then if tolerating increase to 2 tablets (50 mg) daily for 1 week, then if tolerating increase to 3 tablets (75 mg) daily thereafter. Take at night. Monitor of any side effects of drowsiness. Stay at this dose until follow up with the provider.   Go to lab 1-2 months after starting this to monitor kidney function/risk for kidney stones  Stop naltrexone  Call medicaid insurance to see if you can add on prescription coverage so we can check coverage for Zepbound again   Continue taking your iron supplement once daily   Talk to  about getting prescription for iron to add to pill box to help with adherence.    Follow-up: 1 month with Juliet Emanuel PA-C, 2 months with medication therapy management     SUBJECTIVE/OBJECTIVE:                          DERICK Upton is a 34 year old female seen for a follow-up visit.       Reason for visit: weight management  follow-up.    Allergies/ADRs: Reviewed in chart  Past Medical History: Reviewed in chart  Tobacco: She reports that she has never smoked. She has never used smokeless tobacco.  Alcohol: none  [employer]   Medication Adherence/Access: no issues reported.    Weight Management   /NAFLD/pre-diabetes:  Metformin ER 1 g once daily - takes at night   Bupropion 150 mg once daily - prescribed through psychiatry   Naltrexone 25 mg 2 times daily    Has primary care provider in [place]. Following with Juliet Emanuel PA-C for weight management clinic. Naltrexone has been helpful to reduce cravings. No side effects. Still has cravings prior to period. Has been sticking to just eating out once per week. Biggest challenge is keeping food/snacks in her room. Choosing baked chips instead of cookies as snacks at work. Reports psychiatrist is okay with her starting topiramate. She's not sexually active.     Diet/nutrition:   Registered dietitian visit earlier today. Drinking 64-88 ounces of water per day.   Goals:   - eating out once per week   - aiming 90 g protein per day   - take iron and vitamin D consistently     Tried/Failed/Contraindicated Medications:   Metformin: diarrhea. Currently tolerating.   Wegovy: tolerated okay but zepbound works better   Zepbound: This was effective. Stopped 2/2025 due to not having insurance coverage on [facility]. Doesn't have prescription coverage on [place] medicaid so couldn't check for coverage.     No hx pancreatitis/thyroid cancer  No history of kidney stones. Mood is stable.   On clozapine which is associated with a significant amount of weight gain.     Initial Consult Weight: 309.5 lbs  A1c: 5.3% - 12/29/23 per care everywhere     Current weight today: 315 lbs 0 oz  Cumulative Weight Gain: 5.5 lbs    Wt Readings from Last 4 Encounters:   06/09/25 (!) 315 lb (142.9 kg)   06/09/25 (!) 315 lb (142.9 kg)   05/12/25 (!) 310 lb (140.6 kg)   04/07/25 300 lb (136.1 kg)     Estimated body mass  "index is 56.7 kg/m  as calculated from the following:    Height as of this encounter: 5' 2.5\" (1.588 m).    Weight as of this encounter: 315 lb (142.9 kg).    Iron deficiency anemia:   Ferrous sulfate 325 mg once daily   Not taking consistently since it's not in her pill box. No side effects.       Today's Vitals: Ht 5' 2.5\" (1.588 m)   Wt (!) 315 lb (142.9 kg)   BMI 56.70 kg/m    ----------------    I spent 22 minutes with this patient today. All changes were made via collaborative practice agreement with Juliet Emanuel PA-C.     A summary of these recommendations was sent via [employer]    Telemedicine Visit Details  The patient's medications can be safely assessed via a telemedicine encounter.  Type of service:  Telephone visit  Originating Location (pt. Location): Home    Distant Location (provider location):  Off-site  Start Time: 9:38 AM  End Time: 10 am     Medication Therapy Recommendations  Class 3 severe obesity due to excess calories with serious comorbidity and body mass index (BMI) of 50.0 to 59.9 in adult (H)   1 Current Medication: naltrexone (DEPADE/REVIA) 50 MG tablet   Current Medication Sig: Take 0.5 tablets (25 mg) by mouth 2 times daily.   Rationale: More effective medication available - Ineffective medication - Effectiveness   Recommendation: Change Medication - topiramate 25 MG tablet   Status: Accepted per CPA   Identified Date: 6/9/2025 Completed Date: 6/9/2025         2 Current Medication: naltrexone (DEPADE/REVIA) 50 MG tablet (Discontinued)   Current Medication Sig: Take 1 tablet (50 mg) by mouth daily.   Rationale: Synergistic therapy - Needs additional medication therapy - Indication   Recommendation: Start Medication - topiramate 25 MG tablet   Status: No Longer Relevant   Identified Date: 5/12/2025              "

## 2025-06-09 NOTE — PROGRESS NOTES
Ok to stop naltrexone and start topiramate per KAILASH Cardenas, PharmD, [facility]  Medication Therapy Management Pharmacist

## 2025-06-09 NOTE — NURSING NOTE
Current patient location: home     Is the patient currently in the state of [place], [place]    Visit mode: VIDEO    If the visit is dropped, the patient can be reconnected by:VIDEO VISIT: Text to cell phone:   Telephone Information:   Mobile [phone]       Will anyone else be joining the visit? NO  (If patient encounters technical issues they should call [phone]    Are changes needed to the allergy or medication list? N/A    Are refills needed on medications prescribed by this physician? NO    Rooming Documentation:  Not applicable      Reason for visit: RECHECK    Wt other than 24 hrs:  week ago   Pain more than one location:  no  Fannie BARBOZA

## 2025-06-09 NOTE — PATIENT INSTRUCTIONS
"Recommendations from today's MTM visit:                                                      Start Topiramate 25 mg tablet: 1 tablet (25 mg) by mouth daily for 1 week, then if tolerating increase to 2 tablets (50 mg) daily for 1 week, then if tolerating increase to 3 tablets (75 mg) daily thereafter. Take at night. Monitor of any side effects of drowsiness. Stay at this dose until follow up with the provider.   Go to lab 1-2 months after starting this to monitor kidney function/risk for kidney stones  Stop naltrexone  Call medicaid insurance to see if you can add on prescription coverage so we can check coverage for Zepbound again   Continue taking your iron supplement once daily   Talk to  about getting prescription for iron to add to pill box to help with adherence.    Follow-up: 1 month with Juliet Emanuel PA-C, 2 months with medication therapy management   It was great speaking with you today.  I value your experience and would be very thankful for your time in providing feedback in our clinic survey. In the next few days, you may receive an email or text message from [employer] with a link to a survey related to your  clinical pharmacist.\"     To schedule another MTM appointment, please call the clinic directly or you may call the MTM scheduling line at [phone].    My Clinical Pharmacist's contact information:                                                      Please feel free to contact me with any questions or concerns you have.      Floresita Salinas, PharmD, [facility]  Medication Therapy Management Pharmacist      "

## 2025-06-09 NOTE — Clinical Note
Vinicio Chung- patient has hx bipolar type 1. Stable on current medications. She struggles with snacking at night. Would like to try topiramate instead of naltrexone. She said her psychiatrist is okay with her starting topiramate. No hx kidney stones and has nexplanon. Are you okay with her starting this and recheck BMP in 1-2 months? We discussed stopping naltrexone to reduce pill burden but could re-start in the future if needed. Thanks! - Floresita

## 2025-06-09 NOTE — NURSING NOTE
Current patient location: home    Is the patient currently in the [place] of [place], [place]    Visit mode: TELEPHONE    If the visit is dropped, the patient can be reconnected by:TELEPHONE VISIT: Phone number: [phone]    Will anyone else be joining the visit? NO  (If patient encounters technical issues they should call [phone])    Are changes needed to the allergy or medication list? Pt stated no changes to allergies and Pt stated no med changes    Are refills needed on medications prescribed by this physician? NO    Rooming Documentation:  Not applicable      Reason for visit: Medication Therapy Management    Wt other than 24 hrs:  week ago  Pain more than one location:  no  Fannie BARBOZA

## 2025-06-11 DIAGNOSIS — E66.813 CLASS 3 SEVERE OBESITY WITH SERIOUS COMORBIDITY AND BODY MASS INDEX (BMI) OF 50.0 TO 59.9 IN ADULT: Primary | ICD-10-CM

## 2025-06-12 ENCOUNTER — TELEPHONE (OUTPATIENT)
Dept: OBGYN | Age: 35
End: 2025-06-12

## 2025-06-12 ENCOUNTER — HOSPITAL ENCOUNTER (EMERGENCY)
Age: 35
Discharge: HOME | End: 2025-06-12

## 2025-06-12 VITALS
TEMPERATURE: 98 F | SYSTOLIC BLOOD PRESSURE: 129 MMHG | DIASTOLIC BLOOD PRESSURE: 77 MMHG | OXYGEN SATURATION: 99 % | HEART RATE: 100 BPM | RESPIRATION RATE: 16 BRPM

## 2025-06-12 DIAGNOSIS — N92.0 MENORRHAGIA WITH REGULAR CYCLE: Primary | ICD-10-CM

## 2025-06-12 DIAGNOSIS — N92.1 MENORRHAGIA WITH IRREGULAR CYCLE: Primary | ICD-10-CM

## 2025-06-12 DIAGNOSIS — D50.9 IRON DEFICIENCY ANEMIA, UNSPECIFIED IRON DEFICIENCY ANEMIA TYPE: ICD-10-CM

## 2025-06-12 LAB
ABO + RH BLD: NORMAL
ALBUMIN SERPL-MCNC: 3 G/DL (ref 3.4–5)
ALBUMIN/GLOB SERPL: 0.8 {RATIO} (ref 1–2.4)
ALP SERPL-CCNC: 138 UNITS/L (ref 45–117)
ALT SERPL-CCNC: 19 UNITS/L
ANION GAP SERPL CALC-SCNC: 7 MMOL/L (ref 7–19)
APTT PPP: 30 SEC (ref 22–32)
AST SERPL-CCNC: 13 UNITS/L
B-HCG UR QL: NEGATIVE
BASOPHILS # BLD: 0.1 K/MCL (ref 0–0.3)
BASOPHILS NFR BLD: 1 %
BILIRUB SERPL-MCNC: <0.2 MG/DL (ref 0.2–1)
BLD GP AB SCN SERPL QL GEL: NEGATIVE
BUN SERPL-MCNC: 15 MG/DL (ref 6–20)
BUN/CREAT SERPL: 23 (ref 7–25)
CALCIUM SERPL-MCNC: 8.4 MG/DL (ref 8.4–10.2)
CHLORIDE SERPL-SCNC: 113 MMOL/L (ref 97–110)
CO2 SERPL-SCNC: 26 MMOL/L (ref 21–32)
CREAT SERPL-MCNC: 0.66 MG/DL (ref 0.51–0.95)
DEPRECATED RDW RBC: 50.6 FL (ref 39–50)
EGFRCR SERPLBLD CKD-EPI 2021: >90 ML/MIN/{1.73_M2}
EOSINOPHIL # BLD: 0.6 K/MCL (ref 0–0.5)
EOSINOPHIL NFR BLD: 7 %
ERYTHROCYTE [DISTWIDTH] IN BLOOD: 19.9 % (ref 11–15)
FASTING DURATION TIME PATIENT: ABNORMAL H
GLOBULIN SER-MCNC: 3.6 G/DL (ref 2–4)
GLUCOSE SERPL-MCNC: 138 MG/DL (ref 70–99)
HCT VFR BLD CALC: 32 % (ref 36–46.5)
HGB BLD-MCNC: 9.4 G/DL (ref 12–15.5)
IMM GRANULOCYTES # BLD AUTO: 0 K/MCL (ref 0–0.2)
IMM GRANULOCYTES # BLD: 0 %
INR PPP: 0.9
LYMPHOCYTES # BLD: 2 K/MCL (ref 1–4.8)
LYMPHOCYTES NFR BLD: 22 %
MCH RBC QN AUTO: 20.9 PG (ref 26–34)
MCHC RBC AUTO-ENTMCNC: 29.4 G/DL (ref 32–36.5)
MCV RBC AUTO: 71.3 FL (ref 78–100)
MONOCYTES # BLD: 0.6 K/MCL (ref 0.3–0.9)
MONOCYTES NFR BLD: 6 %
NEUTROPHILS # BLD: 6 K/MCL (ref 1.8–7.7)
NEUTROPHILS NFR BLD: 64 %
NRBC BLD MANUAL-RTO: 0 /100 WBC
PLATELET # BLD AUTO: 282 K/MCL (ref 140–450)
POTASSIUM SERPL-SCNC: 4.1 MMOL/L (ref 3.4–5.1)
PROT SERPL-MCNC: 6.6 G/DL (ref 6.4–8.2)
PROTHROMBIN TIME: 10 SEC (ref 9.7–11.8)
RAINBOW EXTRA TUBES HOLD SPECIMEN: NORMAL
RBC # BLD: 4.49 MIL/MCL (ref 4–5.2)
SODIUM SERPL-SCNC: 142 MMOL/L (ref 135–145)
TYPE AND SCREEN EXPIRATION DATE: NORMAL
WBC # BLD: 9.3 K/MCL (ref 4.2–11)

## 2025-06-12 PROCEDURE — 96360 HYDRATION IV INFUSION INIT: CPT

## 2025-06-12 PROCEDURE — 81025 URINE PREGNANCY TEST: CPT | Performed by: PHYSICIAN ASSISTANT

## 2025-06-12 PROCEDURE — 10002807 HB RX 258: Performed by: PHYSICIAN ASSISTANT

## 2025-06-12 PROCEDURE — 10002803 HB RX 637: Performed by: PHYSICIAN ASSISTANT

## 2025-06-12 PROCEDURE — 85025 COMPLETE CBC W/AUTO DIFF WBC: CPT | Performed by: PHYSICIAN ASSISTANT

## 2025-06-12 PROCEDURE — 85730 THROMBOPLASTIN TIME PARTIAL: CPT | Performed by: PHYSICIAN ASSISTANT

## 2025-06-12 PROCEDURE — 85610 PROTHROMBIN TIME: CPT | Performed by: PHYSICIAN ASSISTANT

## 2025-06-12 PROCEDURE — 99284 EMERGENCY DEPT VISIT MOD MDM: CPT

## 2025-06-12 PROCEDURE — 80053 COMPREHEN METABOLIC PANEL: CPT | Performed by: PHYSICIAN ASSISTANT

## 2025-06-12 PROCEDURE — 86900 BLOOD TYPING SEROLOGIC ABO: CPT | Performed by: PHYSICIAN ASSISTANT

## 2025-06-12 RX ORDER — 0.9 % SODIUM CHLORIDE 0.9 %
10 VIAL (ML) INJECTION PRN
Status: DISCONTINUED | OUTPATIENT
Start: 2025-06-12 | End: 2025-06-12 | Stop reason: HOSPADM

## 2025-06-12 RX ORDER — ACETAMINOPHEN 325 MG/1
650 TABLET ORAL ONCE
Status: COMPLETED | OUTPATIENT
Start: 2025-06-12 | End: 2025-06-12

## 2025-06-12 RX ORDER — 0.9 % SODIUM CHLORIDE 0.9 %
2 VIAL (ML) INJECTION EVERY 12 HOURS SCHEDULED
Status: DISCONTINUED | OUTPATIENT
Start: 2025-06-12 | End: 2025-06-12 | Stop reason: HOSPADM

## 2025-06-12 RX ORDER — TRANEXAMIC ACID 650 MG/1
1300 TABLET ORAL 3 TIMES DAILY
Qty: 30 TABLET | Refills: 1 | Status: SHIPPED | OUTPATIENT
Start: 2025-06-12

## 2025-06-12 RX ADMIN — ACETAMINOPHEN 650 MG: 325 TABLET ORAL at 11:24

## 2025-06-12 RX ADMIN — SODIUM CHLORIDE 1000 ML: 9 INJECTION, SOLUTION INTRAVENOUS at 09:50

## 2025-06-12 SDOH — SOCIAL STABILITY: SOCIAL INSECURITY: HOW OFTEN DOES ANYONE, INCLUDING FAMILY AND FRIENDS, THREATEN YOU WITH HARM?: NEVER

## 2025-06-12 SDOH — SOCIAL STABILITY: SOCIAL INSECURITY: HOW OFTEN DOES ANYONE, INCLUDING FAMILY AND FRIENDS, SCREAM OR CURSE AT YOU?: NEVER

## 2025-06-12 SDOH — SOCIAL STABILITY: SOCIAL INSECURITY: HOW OFTEN DOES ANYONE, INCLUDING FAMILY AND FRIENDS, PHYSICALLY HURT YOU?: NEVER

## 2025-06-12 SDOH — SOCIAL STABILITY: SOCIAL INSECURITY: HOW OFTEN DOES ANYONE, INCLUDING FAMILY AND FRIENDS, INSULT OR TALK DOWN TO YOU?: NEVER

## 2025-06-12 ASSESSMENT — PAIN SCALES - GENERAL
PAINLEVEL_OUTOF10: 0
PAINLEVEL_OUTOF10: 5

## 2025-06-16 DIAGNOSIS — F90.0 ATTENTION DEFICIT HYPERACTIVITY DISORDER (ADHD), PREDOMINANTLY INATTENTIVE TYPE: ICD-10-CM

## 2025-06-16 DIAGNOSIS — F31.9 BIPOLAR 1 DISORDER  (CMD): ICD-10-CM

## 2025-06-16 DIAGNOSIS — F41.1 GAD (GENERALIZED ANXIETY DISORDER): ICD-10-CM

## 2025-06-17 RX ORDER — PROPRANOLOL HCL 20 MG
20 TABLET ORAL 2 TIMES DAILY
Qty: 56 TABLET | Refills: 11 | Status: SHIPPED | OUTPATIENT
Start: 2025-06-17

## 2025-06-17 RX ORDER — ATOMOXETINE 80 MG/1
80 CAPSULE ORAL DAILY
Qty: 28 CAPSULE | Refills: 11 | Status: SHIPPED | OUTPATIENT
Start: 2025-06-17

## 2025-06-17 RX ORDER — CLOZAPINE 100 MG/1
300 TABLET ORAL AT BEDTIME
Qty: 84 TABLET | Refills: 11 | Status: SHIPPED | OUTPATIENT
Start: 2025-06-17

## 2025-06-19 ENCOUNTER — APPOINTMENT (OUTPATIENT)
Dept: ULTRASOUND IMAGING | Age: 35
End: 2025-06-19
Attending: STUDENT IN AN ORGANIZED HEALTH CARE EDUCATION/TRAINING PROGRAM

## 2025-06-19 ENCOUNTER — TELEPHONE (OUTPATIENT)
Dept: OBGYN | Age: 35
End: 2025-06-19

## 2025-06-19 ENCOUNTER — APPOINTMENT (OUTPATIENT)
Dept: OBGYN | Age: 35
End: 2025-06-19

## 2025-06-19 VITALS
WEIGHT: 293 LBS | BODY MASS INDEX: 53.92 KG/M2 | DIASTOLIC BLOOD PRESSURE: 84 MMHG | HEIGHT: 62 IN | SYSTOLIC BLOOD PRESSURE: 124 MMHG

## 2025-06-19 VITALS
BODY MASS INDEX: 53.92 KG/M2 | SYSTOLIC BLOOD PRESSURE: 124 MMHG | WEIGHT: 293 LBS | DIASTOLIC BLOOD PRESSURE: 84 MMHG | HEIGHT: 62 IN

## 2025-06-19 DIAGNOSIS — N92.0 MENORRHAGIA WITH REGULAR CYCLE: Primary | ICD-10-CM

## 2025-06-19 DIAGNOSIS — N92.0 MENORRHAGIA WITH REGULAR CYCLE: ICD-10-CM

## 2025-06-19 PROCEDURE — 76830 TRANSVAGINAL US NON-OB: CPT | Performed by: OBSTETRICS & GYNECOLOGY

## 2025-06-19 RX ORDER — MEDROXYPROGESTERONE ACETATE 150 MG/ML
300 INJECTION, SUSPENSION INTRAMUSCULAR ONCE
Status: COMPLETED | OUTPATIENT
Start: 2025-06-19 | End: 2025-06-19

## 2025-06-19 RX ORDER — TOPIRAMATE 25 MG/1
25 TABLET, FILM COATED ORAL DAILY
COMMUNITY
Start: 2025-06-09

## 2025-06-19 RX ADMIN — MEDROXYPROGESTERONE ACETATE 300 MG: 150 INJECTION, SUSPENSION INTRAMUSCULAR at 09:48

## 2025-06-23 ENCOUNTER — APPOINTMENT (OUTPATIENT)
Dept: BEHAVIORAL HEALTH | Age: 35
End: 2025-06-23

## 2025-06-23 DIAGNOSIS — F90.0 ATTENTION DEFICIT HYPERACTIVITY DISORDER (ADHD), PREDOMINANTLY INATTENTIVE TYPE: ICD-10-CM

## 2025-06-23 DIAGNOSIS — F42.2 MIXED OBSESSIONAL THOUGHTS AND ACTS: ICD-10-CM

## 2025-06-23 DIAGNOSIS — F31.9 BIPOLAR 1 DISORDER  (CMD): Primary | ICD-10-CM

## 2025-06-23 DIAGNOSIS — F41.1 GAD (GENERALIZED ANXIETY DISORDER): ICD-10-CM

## 2025-06-23 PROCEDURE — 90832 PSYTX W PT 30 MINUTES: CPT | Performed by: COUNSELOR

## 2025-06-26 ENCOUNTER — APPOINTMENT (OUTPATIENT)
Dept: BEHAVIORAL HEALTH | Age: 35
End: 2025-06-26

## 2025-06-26 ENCOUNTER — E-ADVICE (OUTPATIENT)
Dept: BEHAVIORAL HEALTH | Age: 35
End: 2025-06-26

## 2025-06-26 DIAGNOSIS — G47.09 INITIAL INSOMNIA: ICD-10-CM

## 2025-06-26 DIAGNOSIS — F42.2 MIXED OBSESSIONAL THOUGHTS AND ACTS: ICD-10-CM

## 2025-06-26 DIAGNOSIS — F41.1 GAD (GENERALIZED ANXIETY DISORDER): ICD-10-CM

## 2025-06-26 DIAGNOSIS — F31.9 BIPOLAR 1 DISORDER  (CMD): Primary | ICD-10-CM

## 2025-06-26 DIAGNOSIS — F90.0 ATTENTION DEFICIT HYPERACTIVITY DISORDER (ADHD), PREDOMINANTLY INATTENTIVE TYPE: ICD-10-CM

## 2025-06-26 PROCEDURE — 99214 OFFICE O/P EST MOD 30 MIN: CPT

## 2025-06-27 ENCOUNTER — E-ADVICE (OUTPATIENT)
Dept: BEHAVIORAL HEALTH | Age: 35
End: 2025-06-27

## 2025-06-30 NOTE — PROGRESS NOTES
Return Medical Weight Management Note     Ben Upton  MRN:  [id number]  :  1990  DASHAWN:  2025    Dear José Harris MD,    I had the pleasure of seeing your patient Ben Upton. She is a 34 year old female who I am continuing to see for treatment of obesity related to:        2024     6:38 PM   --   I have the following health issues associated with obesity Fatty Liver    Asthma   I have the following symptoms associated with obesity Depression    Irregular Menstral Cycle       Assessment & Plan   Problem List Items Addressed This Visit       Class 3 severe obesity with serious comorbidity and body mass index (BMI) of 50.0 to 59.9 in adult, unspecified obesity type (H) - Primary    Relevant Medications    metFORMIN (GLUCOPHAGE XR) 500 MG 24 hr tablet    Other Relevant Orders    Hemoglobin A1c    Adult Sleep Evaluation & Management  Referral    Fatty liver    Relevant Medications    metFORMIN (GLUCOPHAGE XR) 500 MG 24 hr tablet    topiramate (TOPAMAX) 25 MG tablet    Other Relevant Orders    Hemoglobin A1c    Adult Sleep Evaluation & Management  Referral     Other Visit Diagnoses         Class 3 severe obesity due to excess calories with serious comorbidity and body mass index (BMI) of 50.0 to 59.9 in adult (H)        Relevant Medications    metFORMIN (GLUCOPHAGE XR) 500 MG 24 hr tablet    topiramate (TOPAMAX) 25 MG tablet      Prediabetes        Relevant Medications    topiramate (TOPAMAX) 25 MG tablet    Other Relevant Orders    Adult Sleep Evaluation & Management  Referral           Plan  Increase your metformin: Take 2 tablets (1000mg total) with a meal each morning and 1 tablet  (500mg) with a meal each evening for 1 week. As long as tolerating can increase to 2 tablets (1000mg) with meal twice a day moving forward, total of 2000mg daily.    Continue topiramate 75mg   Could consider restarting naltrexone to help with further weight loss (was  discontinued to reduce pill burden)  Referral placed to sleep medicine given fatigue, snoring, concern for sleep apnea   Goals we discussed today:   Continuing to eat out no more than once a week   Labs ordered today: A1c- will fax to [facility] in [facility][note 3].  Follow up with Juliet in 3 months   Dietician appointment next month   Keep up the excellent work!       INTERVAL HISTORY:  New mwm with me 6/27/2024   [employer] employee, started zepbound with [facility], switched to wegovy due to supply issues .     Overweight onset age 19, weighed 170lbs, over 4 years gained up to 260lbs, gradual continued weight gain to 310.  Highest weight in life was 310, was at this weight a few months ago prior to starting wegovy.   Prior to starting wegovy was unable to lose weight in the past. Since starting wegovy has lost 20lbs.      Comorbidities associated with weight gain include fatty liver disease, asthma, prediabetes.  Additional health issues include borderline personality, bipolar 1, adhd, anxiety, ocd, ptsd. Works with a therapist every couple weeks. Also sees psychiatrist every 3 months. Feels mental health is overall pretty good.   Motivators for weight loss include to improve health, reduce risk for diabetes, manage fatty liver.      Regarding eating patterns and diet, she typically eats 3 meals a day. Craves sweets, loves ice cream. Brother works at [employer], eats at [employer] a lot (gets ice cream a lot there). Is able to get full. Struggles to stay full until next meal, will generally snack. Does struggle with portion control. Does experience food noise, though wonders if some of this is related to her OCD and obsessive tendencies, describes these symptoms have improved with medication for OCD. Also thinks the wegovy has helped curb food noise. Does experience emotional eating. Sometimes will experience a loss of control around eating, maybe a couple of days a week, particularly when watching  tv in her room. Denies any history of purging to compensate for this.   Eats out/ gets take out 2-3 a week, though 2 of these times are generally just getting ice cream at [employer] (go-to is a pint of ice cream that is chocolate based). Drinks water with crystal lite or plain water. Sometimes drinks prime. Diet pop occasionally, 1-2 days a week. No ETOH.      Regarding activity, works at [employer], is on her feet all day. Loves this job. Just bought a bike, hopes to use this more with the better weather. Loves going [employer] 2x a week, hasn't been able to go recently due to not having anyone to go with. From August- May is in a [employer] league.   -switched back to zepbound as she had seen improved response      Last seen by me 1/7/25  Frustrated with [employer] insurance changes.   Tried sending zepbound through [facility] to see if covered back in 2024, reports it was not covered.   Never started naltrexone as prescribed through [facility], due to confusion with the pharmacy.      Was prescribed wellbutrin through psychiatry, planning on starting this tonight.      Will try sending zepbound through [facility].   Otherwise will start naltrexone and wellbutrin combo.      She is interested in revisiting metformin with a low and slow titration- recalls having worsened diarrhea with this med but wonders if it was with increasing dose too quickly .  -started naltrexone  -started wellbutrin through psychiatry   Later restarting metformin.   In June 2025 Also started topiramate- reports psychiatrist was alright with this plan     Today in visit 7/1/25  30lbs weight gain since last visit.   Has just reached 75mg of topiramate- has been on this dose for 1 day- has been helpful in reducing snacking.   Interested in increasing metformin to see if helpful.     Planning to taper off clozaril through psychiatry to see if helpful for weight loss.     Some concerns due to heavy periods, concerns for anemia, being followed by obgyn,  considering uterine ablation. Is starting an iron supplement.    Wt Readings from Last 5 Encounters:   07/01/25 (!) 142.9 kg (315 lb)   06/09/25 (!) 142.9 kg (315 lb)   06/09/25 (!) 142.9 kg (315 lb)   05/12/25 (!) 140.6 kg (310 lb)   04/07/25 136.1 kg (300 lb)       Anti-obesity medication history    Current:   Topiramate- helpful with reducing snacking, just reached 75mg dose after gradual titration.   Metformin- 1000mg, continuing to tolearte     Past/Failed/contraindicated:   Naltrexone- stopped due to lack of efficacy, to reduce pill burden      Recent diet changes: reducing snacking, cooking at home more.   B: protein shake or protein bar   L: birds eye powerblend or chicken strips or smart ones meal   D: mom's cooking- hamburgers, egg roll bowls, chicken.   [employer] ice cream occasionally after dinner.   Drinking mostly water throughout the day, sometimes water with crystal lite. Occasionally diet soda or powerade zero. No etoh.     Recent exercise/activity changes: working at [employer]- on her feet for this job. Bought a soccer goal and has been playing soccer in the back yard a couple times.     Recent stressors: mental health is stable, trialing weaning of clozaril to help with weight loss. Seeing psychiatrist in 2 months.     Recent sleep changes: no major changes- snores, has never had a sleep study.     Vitamins/Labs: low vitamin d on labs 4/7/25- has started a regular vitamin d supplement in her blister pack. Will check A1c today.     Pregnancy: nexplanon     CURRENT WEIGHT:   315 lbs 0 oz    Initial Weight (lbs): 290 lbs  Last Visits Weight: 142.9 kg (315 lb)  Cumulative weight loss (lbs): -25  Weight Loss Percentage: -8.62%        1/7/2025    12:45 PM   Changes and Difficulties   I have made the following changes to my diet since my last visit: Less eating out and more protein   With regards to my diet, I am still struggling with: Snacking and sweets   I have made the following changes to  my activity/exercise since my last visit: Walking more   With regards to my activity/exercise, I am still struggling with: Motivation             MEDICATIONS:   Current Outpatient Medications   Medication Sig Dispense Refill    metFORMIN (GLUCOPHAGE XR) 500 MG 24 hr tablet Take 2 tablets (1000mg total) with a meal each morning and 1 tablet  (500mg) with a meal each evening for 1 week. As long as tolerating can increase to 2 tablets (1000mg) with meal twice a day moving forward, total of 2000mg daily. 120 tablet 3    topiramate (TOPAMAX) 25 MG tablet Take 1 tablet by mouth once daily for 1 week, then 2 tablets once daily for 1 week, then 3 tablets once daily thereafter. Take with dinner or at bedtime. 90 tablet 3    albuterol (PROAIR HFA;PROVENTIL HFA;VENTOLIN HFA) 90 mcg/actuation inhaler [ALBUTEROL (PROAIR HFA;PROVENTIL HFA;VENTOLIN HFA) 90 MCG/ACTUATION INHALER] Inhale 2 puffs every 6 (six) hours as needed for wheezing. (Patient not taking: Reported on 6/9/2025)      albuterol (PROVENTIL) (2.5 MG/3ML) 0.083% neb solution Take 2.5 mg by nebulization every 6 hours as needed for shortness of breath, wheezing or cough (Patient not taking: Reported on 6/9/2025)      atomoxetine (STRATTERA) 80 MG capsule [ATOMOXETINE (STRATTERA) 80 MG CAPSULE] Take 80 mg by mouth daily.      buPROPion (WELLBUTRIN XL) 150 MG 24 hr tablet Take 1 tablet by mouth daily.      clomiPRAMINE (ANAFRANIL) 25 MG capsule [CLOMIPRAMINE (ANAFRANIL) 25 MG CAPSULE] Take 25 mg by mouth daily.      cloZAPine (CLOZARIL) 100 MG tablet [CLOZAPINE (CLOZARIL) 100 MG TABLET] Take 300 mg by mouth at bedtime. Monthly wbc      etonogestrel (NEXPLANON) 68 MG IMPL 68 mg by Subdermal route once      ferrous sulfate (FEROSUL) 325 (65 Fe) MG tablet Take 1 tablet by mouth daily (Patient taking differently: Take 1 tablet by mouth daily as needed.)      hydrOXYzine osmar (VISTARIL) 25 MG capsule Take 25-50 mg by mouth 3 times daily as needed (panic attacks).      ibuprofen  "(ADVIL,MOTRIN) 200 MG tablet [IBUPROFEN (ADVIL,MOTRIN) 200 MG TABLET] Take 200 mg by mouth every 6 (six) hours as needed for pain.      propranoloL (INDERAL) 20 MG tablet Take 20 mg by mouth 2 times daily.      traZODone (DESYREL) 50 MG tablet Take 50 mg by mouth nightly as needed for sleep      Vitamin D, Ergocalciferol, 76605 units CAPS Take 50,000 Units by mouth once a week.             7/1/2025    10:22 AM   Weight Loss Medication History Reviewed With Patient   Which weight loss medications are you currently taking on a regular basis? Bupropion    Metformin   Are you having any side effects from the weight loss medication that we have prescribed you? No                No data to display                  PHYSICAL EXAM:  Objective    Ht 1.588 m (5' 2.5\")   Wt (!) 142.9 kg (315 lb)   BMI 56.70 kg/m      Vitals - Patient Reported  Pain Score: No Pain (0)      Vitals:  No vitals were obtained today due to virtual visit.    GENERAL: alert and no distress  EYES: Eyes grossly normal to inspection.  No discharge or erythema, or obvious scleral/conjunctival abnormalities.  RESP: No audible wheeze, cough, or visible cyanosis.    SKIN: Visible skin clear. No significant rash, abnormal pigmentation or lesions.  NEURO: Cranial nerves grossly intact.  Mentation and speech appropriate for age.  PSYCH: Appropriate affect, tone, and pace of words        Sincerely,    Juliet Emanuel PA-C      23 minutes spent by me on the date of the encounter doing chart review, history and exam, documentation and further activities per the note    The longitudinal plan of care for the diagnosis(es)/condition(s) as documented were addressed during this visit. Due to the added complexity in care, I will continue to support CJ in the subsequent management and with ongoing continuity of care.   "

## 2025-07-01 ENCOUNTER — VIRTUAL VISIT (OUTPATIENT)
Dept: ENDOCRINOLOGY | Facility: CLINIC | Age: 35
End: 2025-07-01
Payer: COMMERCIAL

## 2025-07-01 ENCOUNTER — DOCUMENTATION ONLY (OUTPATIENT)
Dept: ENDOCRINOLOGY | Facility: CLINIC | Age: 35
End: 2025-07-01

## 2025-07-01 VITALS — BODY MASS INDEX: 51.91 KG/M2 | HEIGHT: 63 IN | WEIGHT: 293 LBS

## 2025-07-01 DIAGNOSIS — R73.03 PREDIABETES: ICD-10-CM

## 2025-07-01 DIAGNOSIS — E66.813 CLASS 3 SEVERE OBESITY WITH SERIOUS COMORBIDITY AND BODY MASS INDEX (BMI) OF 50.0 TO 59.9 IN ADULT, UNSPECIFIED OBESITY TYPE: ICD-10-CM

## 2025-07-01 DIAGNOSIS — E66.813 CLASS 3 SEVERE OBESITY WITH SERIOUS COMORBIDITY AND BODY MASS INDEX (BMI) OF 50.0 TO 59.9 IN ADULT, UNSPECIFIED OBESITY TYPE (H): Primary | ICD-10-CM

## 2025-07-01 DIAGNOSIS — E66.813 CLASS 3 SEVERE OBESITY DUE TO EXCESS CALORIES WITH SERIOUS COMORBIDITY AND BODY MASS INDEX (BMI) OF 50.0 TO 59.9 IN ADULT (H): ICD-10-CM

## 2025-07-01 DIAGNOSIS — K76.0 FATTY LIVER: Primary | ICD-10-CM

## 2025-07-01 DIAGNOSIS — K76.0 FATTY LIVER: ICD-10-CM

## 2025-07-01 PROCEDURE — 98005 SYNCH AUDIO-VIDEO EST LOW 20: CPT

## 2025-07-01 PROCEDURE — 1126F AMNT PAIN NOTED NONE PRSNT: CPT | Mod: 95

## 2025-07-01 PROCEDURE — G2211 COMPLEX E/M VISIT ADD ON: HCPCS | Mod: 95

## 2025-07-01 RX ORDER — METFORMIN HYDROCHLORIDE 500 MG/1
TABLET, EXTENDED RELEASE ORAL
Qty: 120 TABLET | Refills: 3 | Status: SHIPPED | OUTPATIENT
Start: 2025-07-01

## 2025-07-01 RX ORDER — TOPIRAMATE 25 MG/1
TABLET, FILM COATED ORAL
Qty: 90 TABLET | Refills: 3 | Status: SHIPPED | OUTPATIENT
Start: 2025-07-01

## 2025-07-01 ASSESSMENT — PAIN SCALES - GENERAL: PAINLEVEL_OUTOF10: NO PAIN (0)

## 2025-07-01 NOTE — PROGRESS NOTES
Virtual Visit Details    Type of service:  Video Visit   Video Start Time: 12:35pm  Video End Time:1:55pm    Originating Location (pt. Location): Home    Distant Location (provider location):  Off-site  Platform used for Video Visit: [employer]

## 2025-07-01 NOTE — LETTER
2025       RE: Ben Upton  [address] [address]     Dear Colleague,    Thank you for referring your patient, Ben Upton, to the [facility]IGHT [facility]NAGEMENT [facility]IN[facility]. Please see a copy of my visit note below.      Return Medical Weight Management Note     Ben Upton  MRN:  [id number]  :  1990  DASHAWN:  2025    Dear José Harris MD,    I had the pleasure of seeing your patient Ben Upton. She is a 34 year old female who I am continuing to see for treatment of obesity related to:        2024     6:38 PM   --   I have the following health issues associated with obesity Fatty Liver    Asthma   I have the following symptoms associated with obesity Depression    Irregular Menstral Cycle       Assessment & Plan  Problem List Items Addressed This Visit       Class 3 severe obesity with serious comorbidity and body mass index (BMI) of 50.0 to 59.9 in adult, unspecified obesity type (H) - Primary    Relevant Medications    metFORMIN (GLUCOPHAGE XR) 500 MG 24 hr tablet    Other Relevant Orders    Hemoglobin A1c    Adult Sleep Evaluation & Management  Referral    Fatty liver    Relevant Medications    metFORMIN (GLUCOPHAGE XR) 500 MG 24 hr tablet    topiramate (TOPAMAX) 25 MG tablet    Other Relevant Orders    Hemoglobin A1c    Adult Sleep Evaluation & Management  Referral     Other Visit Diagnoses         Class 3 severe obesity due to excess calories with serious comorbidity and body mass index (BMI) of 50.0 to 59.9 in adult (H)        Relevant Medications    metFORMIN (GLUCOPHAGE XR) 500 MG 24 hr tablet    topiramate (TOPAMAX) 25 MG tablet      Prediabetes        Relevant Medications    topiramate (TOPAMAX) 25 MG tablet    Other Relevant Orders    Adult Sleep Evaluation & Management  Referral           Plan  Increase your metformin: Take 2 tablets  (1000mg total) with a meal each morning and 1 tablet  (500mg) with a meal each evening for 1 week. As long as tolerating can increase to 2 tablets (1000mg) with meal twice a day moving forward, total of 2000mg daily.    Continue topiramate 75mg   Could consider restarting naltrexone to help with further weight loss (was discontinued to reduce pill burden)  Referral placed to sleep medicine given fatigue, snoring, concern for sleep apnea   Goals we discussed today:   Continuing to eat out no more than once a week   Labs ordered today: A1c- will fax to [facility] in [facility].  Follow up with Juliet in 3 months   Dietician appointment next month   Keep up the excellent work!       INTERVAL HISTORY:  New mwm with me 6/27/2024   [employer] employee, started zepbound with [facility] Salinas, switched to wegovy due to supply issues .     Overweight onset age 19, weighed 170lbs, over 4 years gained up to 260lbs, gradual continued weight gain to 310.  Highest weight in life was 310, was at this weight a few months ago prior to starting wegovy.   Prior to starting wegovy was unable to lose weight in the past. Since starting wegovy has lost 20lbs.      Comorbidities associated with weight gain include fatty liver disease, asthma, prediabetes.  Additional health issues include borderline personality, bipolar 1, adhd, anxiety, ocd, ptsd. Works with a therapist every couple weeks. Also sees psychiatrist every 3 months. Feels mental health is overall pretty good.   Motivators for weight loss include to improve health, reduce risk for diabetes, manage fatty liver.      Regarding eating patterns and diet, she typically eats 3 meals a day. Craves sweets, loves ice cream. Brother works at [employer], eats at [employer] a lot (gets ice cream a lot there). Is able to get full. Struggles to stay full until next meal, will generally snack. Does struggle with portion control. Does experience food noise, though wonders if some  of this is related to her OCD and obsessive tendencies, describes these symptoms have improved with medication for OCD. Also thinks the wegovy has helped curb food noise. Does experience emotional eating. Sometimes will experience a loss of control around eating, maybe a couple of days a week, particularly when watching tv in her room. Denies any history of purging to compensate for this.   Eats out/ gets take out 2-3 a week, though 2 of these times are generally just getting ice cream at [employer] (go-to is a pint of ice cream that is chocolate based). Drinks water with crystal lite or plain water. Sometimes drinks prime. Diet pop occasionally, 1-2 days a week. No ETOH.      Regarding activity, works at [employer], is on her feet all day. Loves this job. Just bought a bike, hopes to use this more with the better weather. Loves going [employer] 2x a week, hasn't been able to go recently due to not having anyone to go with. From August- May is in a [employer] league.   -switched back to zepbound as she had seen improved response      Last seen by me 1/7/25  Frustrated with [employer] insurance changes.   Tried sending zepbound through [facility] to see if covered back in 2024, reports it was not covered.   Never started naltrexone as prescribed through Floresita Salinas, due to confusion with the pharmacy.      Was prescribed wellbutrin through psychiatry, planning on starting this tonight.      Will try sending zepbound through [facility].   Otherwise will start naltrexone and wellbutrin combo.      She is interested in revisiting metformin with a low and slow titration- recalls having worsened diarrhea with this med but wonders if it was with increasing dose too quickly .  -started naltrexone  -started wellbutrin through psychiatry   Later restarting metformin.   In June 2025 Also started topiramate- reports psychiatrist was alright with this plan     Today in visit 7/1/25  30lbs weight gain since last visit.   Has just reached  75mg of topiramate- has been on this dose for 1 day- has been helpful in reducing snacking.   Interested in increasing metformin to see if helpful.     Planning to taper off clozaril through psychiatry to see if helpful for weight loss.     Some concerns due to heavy periods, concerns for anemia, being followed by obgyn, considering uterine ablation. Is starting an iron supplement.    Wt Readings from Last 5 Encounters:   07/01/25 (!) 142.9 kg (315 lb)   06/09/25 (!) 142.9 kg (315 lb)   06/09/25 (!) 142.9 kg (315 lb)   05/12/25 (!) 140.6 kg (310 lb)   04/07/25 136.1 kg (300 lb)       Anti-obesity medication history    Current:   Topiramate- helpful with reducing snacking, just reached 75mg dose after gradual titration.   Metformin- 1000mg, continuing to tolearte     Past/Failed/contraindicated:   Naltrexone- stopped due to lack of efficacy, to reduce pill burden      Recent diet changes: reducing snacking, cooking at home more.   B: protein shake or protein bar   L: birds eye powerblend or chicken strips or smart ones meal   D: mom's cooking- hamburgers, egg roll bowls, chicken.   [employer] ice cream occasionally after dinner.   Drinking mostly water throughout the day, sometimes water with crystal lite. Occasionally diet soda or powerade zero. No etoh.     Recent exercise/activity changes: working at [employer]- on her feet for this job. Bought a soccer goal and has been playing soccer in the back yard a couple times.     Recent stressors: mental health is stable, trialing weaning of clozaril to help with weight loss. Seeing psychiatrist in 2 months.     Recent sleep changes: no major changes- snores, has never had a sleep study.     Vitamins/Labs: low vitamin d on labs 4/7/25- has started a regular vitamin d supplement in her blister pack. Will check A1c today.     Pregnancy: nexplanon     CURRENT WEIGHT:   315 lbs 0 oz    Initial Weight (lbs): 290 lbs  Last Visits Weight: 142.9 kg (315 lb)  Cumulative  weight loss (lbs): -25  Weight Loss Percentage: -8.62%        1/7/2025    12:45 PM   Changes and Difficulties   I have made the following changes to my diet since my last visit: Less eating out and more protein   With regards to my diet, I am still struggling with: Snacking and sweets   I have made the following changes to my activity/exercise since my last visit: Walking more   With regards to my activity/exercise, I am still struggling with: Motivation             MEDICATIONS:   Current Outpatient Medications   Medication Sig Dispense Refill     metFORMIN (GLUCOPHAGE XR) 500 MG 24 hr tablet Take 2 tablets (1000mg total) with a meal each morning and 1 tablet  (500mg) with a meal each evening for 1 week. As long as tolerating can increase to 2 tablets (1000mg) with meal twice a day moving forward, total of 2000mg daily. 120 tablet 3     topiramate (TOPAMAX) 25 MG tablet Take 1 tablet by mouth once daily for 1 week, then 2 tablets once daily for 1 week, then 3 tablets once daily thereafter. Take with dinner or at bedtime. 90 tablet 3     albuterol (PROAIR HFA;PROVENTIL HFA;VENTOLIN HFA) 90 mcg/actuation inhaler [ALBUTEROL (PROAIR HFA;PROVENTIL HFA;VENTOLIN HFA) 90 MCG/ACTUATION INHALER] Inhale 2 puffs every 6 (six) hours as needed for wheezing. (Patient not taking: Reported on 6/9/2025)       albuterol (PROVENTIL) (2.5 MG/3ML) 0.083% neb solution Take 2.5 mg by nebulization every 6 hours as needed for shortness of breath, wheezing or cough (Patient not taking: Reported on 6/9/2025)       atomoxetine (STRATTERA) 80 MG capsule [ATOMOXETINE (STRATTERA) 80 MG CAPSULE] Take 80 mg by mouth daily.       buPROPion (WELLBUTRIN XL) 150 MG 24 hr tablet Take 1 tablet by mouth daily.       clomiPRAMINE (ANAFRANIL) 25 MG capsule [CLOMIPRAMINE (ANAFRANIL) 25 MG CAPSULE] Take 25 mg by mouth daily.       cloZAPine (CLOZARIL) 100 MG tablet [CLOZAPINE (CLOZARIL) 100 MG TABLET] Take 300 mg by mouth at bedtime. Monthly wbc        "etonogestrel (NEXPLANON) 68 MG IMPL 68 mg by Subdermal route once       ferrous sulfate (FEROSUL) 325 (65 Fe) MG tablet Take 1 tablet by mouth daily (Patient taking differently: Take 1 tablet by mouth daily as needed.)       hydrOXYzine osmar (VISTARIL) 25 MG capsule Take 25-50 mg by mouth 3 times daily as needed (panic attacks).       ibuprofen (ADVIL,MOTRIN) 200 MG tablet [IBUPROFEN (ADVIL,MOTRIN) 200 MG TABLET] Take 200 mg by mouth every 6 (six) hours as needed for pain.       propranoloL (INDERAL) 20 MG tablet Take 20 mg by mouth 2 times daily.       traZODone (DESYREL) 50 MG tablet Take 50 mg by mouth nightly as needed for sleep       Vitamin D, Ergocalciferol, 96384 units CAPS Take 50,000 Units by mouth once a week.             7/1/2025    10:22 AM   Weight Loss Medication History Reviewed With Patient   Which weight loss medications are you currently taking on a regular basis? Bupropion    Metformin   Are you having any side effects from the weight loss medication that we have prescribed you? No                No data to display                  PHYSICAL EXAM:  Objective   Ht 1.588 m (5' 2.5\")   Wt (!) 142.9 kg (315 lb)   BMI 56.70 kg/m      Vitals - Patient Reported  Pain Score: No Pain (0)      Vitals:  No vitals were obtained today due to virtual visit.    GENERAL: alert and no distress  EYES: Eyes grossly normal to inspection.  No discharge or erythema, or obvious scleral/conjunctival abnormalities.  RESP: No audible wheeze, cough, or visible cyanosis.    SKIN: Visible skin clear. No significant rash, abnormal pigmentation or lesions.  NEURO: Cranial nerves grossly intact.  Mentation and speech appropriate for age.  PSYCH: Appropriate affect, tone, and pace of words        Sincerely,    Juliet Emanuel PA-C      23 minutes spent by me on the date of the encounter doing chart review, history and exam, documentation and further activities per the note    The longitudinal plan of care for the " diagnosis(es)/condition(s) as documented were addressed during this visit. Due to the added complexity in care, I will continue to support CJ in the subsequent management and with ongoing continuity of care.     Virtual Visit Details    Type of service:  Video Visit   Video Start Time: 12:35pm  Video End Time:1:55pm    Originating Location (pt. Location): Home    Distant Location (provider location):  Off-site  Platform used for Video Visit: AmWell      Again, thank you for allowing me to participate in the care of your patient.      Sincerely,    Juliet Emanuel PA-C

## 2025-07-01 NOTE — PATIENT INSTRUCTIONS
"Thank you for allowing us the privilege of caring for you. We hope we provided you with the excellent service you deserve.   Please let us know if there is anything else we can do for you so that we can be sure you are completely satisfied with your care experience.    To ensure the quality of our services you may be receiving a patient satisfaction survey from an independent patient satisfaction monitoring company.    The greatest compliment you can give is a \"Likely to Recommend\"    Your visit was with Juliet Emanuel PA-C today.    Instructions per today's visit:     Jamil Upton, it was great to visit with you today.  Here is a review of our visit.  If our clinic scheduler is not able to reach you please call [phone] to schedule your next appointments.    Plan  Increase your metformin: Take 2 tablets (1000mg total) with a meal each morning and 1 tablet  (500mg) with a meal each evening for 1 week. As long as tolerating can increase to 2 tablets (1000mg) with meal twice a day moving forward, total of 2000mg daily.    Continue topiramate 75mg   Could consider restarting naltrexone to help with further weight loss (was discontinued to reduce pill burden)  Referral placed to sleep medicine given fatigue, snoring, concern for sleep apnea   Goals we discussed today:   Continuing to eat out no more than once a week   Labs ordered today: A1c- will fax to [facility] in [facility].  Follow up with Juliet in 3 months   Dietician appointment next month   Keep up the excellent work!       Information about Video Visits with [employer] [facility] [place]: video visit information  _________________________________________________________________________________________________________________________________________________________  If you are asked by your clinic team to have your blood pressure checked:  [facility] do offer several locations for blood pressure checks. Please follow the " below link to schedule an appointment. Scheduling an appointment at the pharmacy for a blood pressure check is now preferred.    Appointment Plus ([email or web address])  _________________________________________________________________________________________________________________________________________________________  Important contact and scheduling information:  Please call our contact center at [phone] to schedule your next appointments.  To find a lab location near you, please call [phone].  For any nursing questions or concerns call Shanti Barrios LPN at [phone] or Cinthya Zapata RN at [phone]  Please call during clinic hours Monday through Friday 8:00a - 4:00p if you have questions or you can contact us via Swapdom at anytime and we will reply during clinic hours.    Lab results will be communicated through My Chart or letter (if My Chart not used). Please call the clinic if you have not received communication after 1 week or if you have any questions.?  Clinic Fax: [phone]    Work with [facility] !  Virtual Sessions are Available through [facility] Weight Management Clinics    To learn more, call to schedule an appointment: [phone]     What is Health Coaching?  Do you know what you are supposed to do, but you just aren't doing it?  Then, HEALTH COACHING may help you!   Get unstuck and move forward with the support of a professionally trained NBC-HWC (National Board-Certified Health and ) who uses evidence-based approaches to help you move forward with healthy lifestyle changes in the areas of weight loss, stress management and overall well-being.    Health Coaches help you identify goals that will work best for you. Health Coaches provide support and encouragement with overcoming barriers and help you to find inspiration and motivation to lead a healthy lifestyle.    Health Coaching 3-Pack; Three, 30-minute Health Coaching Visits, for $135  Health  Coaching 6-Pack; Six, 30-minute Health coaching visits, for $250  Visits are done virtually (phone or video)  This is a self pay service; we do not accept insurance for steven coaching.    ____________________________________________________________________  [facility]  Healthy Lifestyle Group    Healthy Lifestyle Group  This is a 60 minute virtual coaching group for those who want to lead a healthier lifestyle. Come together to set goals and overcome barriers in a supportive group environment. We will address the four pillars of health--nutrition, exercise, sleep and emotional well-being.  This group is highly recommended for those who are participating in the 24 week Healthy Lifestyle Plan and our Health Coaching sessions.    WHEN: This group meets the first Friday of the month, 12:30 PM - 1:30 PM online, via a zoom meeting.      FACILITATOR: Led by National Board Certified Health and , Bisi Pelaez [facility].    TO REGISTER: Please call the Call Center at [phone] to register. You will get an appointment to attend in [facility]. Fifteen minutes prior to the meeting, complete the e-check in and you will get the link to join the meeting.  There is no charge to attend this group and space is limited.      _________________________________________________________________________________________________________________________________________________________  __________  [facility] Get Moving Program  Our team of physical therapists is trained to help you understand and take control of your condition. They will perform a thorough evaluation to determine your ability for activity and develop a customized plan to fit your goals and physical ability.  Scheduling: Unsure if the Get Moving program is right for you? Discuss the program with your medical provider or diabetes educator. You can also call us at [phone] to ask questions or  schedule an appointment.   [facility] Get Moving Program  ____________________________________________________________________________________________________________________________________________________________________________        Thank you,   [facility] Comprehensive Weight Management Team

## 2025-07-01 NOTE — NURSING NOTE
Current patient location: [address] [place] [id number]    Is the patient currently in the state of [place]? YES    Visit mode: VIDEO    If the visit is dropped, the patient can be reconnected by:VIDEO VISIT: Text to cell phone:   Telephone Information:   Mobile [phone]       Will anyone else be joining the visit? NO  (If patient encounters technical issues they should call [phone]    Are changes needed to the allergy or medication list? No    Are refills needed on medications prescribed by this physician? NO    Rooming Documentation:  Questionnaire(s) completed    Reason for visit: CHERYL RUSSO

## 2025-07-02 ENCOUNTER — PATIENT OUTREACH (OUTPATIENT)
Dept: CARE COORDINATION | Facility: CLINIC | Age: 35
End: 2025-07-02
Payer: COMMERCIAL

## 2025-07-05 ENCOUNTER — PATIENT OUTREACH (OUTPATIENT)
Dept: CARE COORDINATION | Facility: CLINIC | Age: 35
End: 2025-07-05
Payer: COMMERCIAL

## 2025-07-14 ENCOUNTER — APPOINTMENT (OUTPATIENT)
Dept: BEHAVIORAL HEALTH | Age: 35
End: 2025-07-14

## 2025-07-14 ENCOUNTER — APPOINTMENT (OUTPATIENT)
Dept: OBGYN | Age: 35
End: 2025-07-14

## 2025-07-14 ENCOUNTER — TELEPHONE (OUTPATIENT)
Dept: OBGYN | Age: 35
End: 2025-07-14

## 2025-07-14 VITALS
DIASTOLIC BLOOD PRESSURE: 76 MMHG | BODY MASS INDEX: 53.92 KG/M2 | WEIGHT: 293 LBS | SYSTOLIC BLOOD PRESSURE: 120 MMHG | HEIGHT: 62 IN

## 2025-07-14 DIAGNOSIS — N92.4 EXCESSIVE BLEEDING IN PREMENOPAUSAL PERIOD: Primary | ICD-10-CM

## 2025-07-14 DIAGNOSIS — F90.0 ATTENTION DEFICIT HYPERACTIVITY DISORDER (ADHD), PREDOMINANTLY INATTENTIVE TYPE: ICD-10-CM

## 2025-07-14 DIAGNOSIS — F42.2 MIXED OBSESSIONAL THOUGHTS AND ACTS: ICD-10-CM

## 2025-07-14 DIAGNOSIS — F41.1 GAD (GENERALIZED ANXIETY DISORDER): ICD-10-CM

## 2025-07-14 DIAGNOSIS — F31.9 BIPOLAR 1 DISORDER  (CMD): Primary | ICD-10-CM

## 2025-07-14 PROCEDURE — 99214 OFFICE O/P EST MOD 30 MIN: CPT | Performed by: OBSTETRICS & GYNECOLOGY

## 2025-07-15 ENCOUNTER — E-ADVICE (OUTPATIENT)
Dept: OBGYN | Age: 35
End: 2025-07-15

## 2025-07-17 PROBLEM — K44.9 HIATAL HERNIA: Status: ACTIVE | Noted: 2018-03-19

## 2025-07-17 PROBLEM — L23.9 ALLERGIC DERMATITIS: Status: ACTIVE | Noted: 2021-08-30

## 2025-07-17 PROBLEM — F41.9 ANXIETY: Status: ACTIVE | Noted: 2024-06-27

## 2025-07-17 PROBLEM — J45.20 MILD INTERMITTENT ASTHMA (CMD): Status: ACTIVE | Noted: 2017-11-28

## 2025-07-17 PROBLEM — Z30.9 CONTRACEPTION MANAGEMENT: Status: ACTIVE | Noted: 2025-07-17

## 2025-07-17 PROBLEM — E66.9 OBESITY, UNSPECIFIED: Status: ACTIVE | Noted: 2024-03-20

## 2025-07-17 PROBLEM — R03.0 ELEVATED BLOOD-PRESSURE READING WITHOUT DIAGNOSIS OF HYPERTENSION: Status: ACTIVE | Noted: 2022-12-30

## 2025-07-22 ENCOUNTER — PREP FOR CASE (OUTPATIENT)
Dept: OBGYN | Age: 35
End: 2025-07-22

## 2025-07-22 DIAGNOSIS — N92.0 MENORRHAGIA WITH REGULAR CYCLE: Primary | ICD-10-CM

## 2025-07-24 RX ORDER — 0.9 % SODIUM CHLORIDE 0.9 %
2 VIAL (ML) INJECTION EVERY 12 HOURS SCHEDULED
OUTPATIENT
Start: 2025-07-24

## 2025-07-24 RX ORDER — 0.9 % SODIUM CHLORIDE 0.9 %
10 VIAL (ML) INJECTION PRN
OUTPATIENT
Start: 2025-07-24

## 2025-07-24 RX ORDER — METRONIDAZOLE 500 MG/100ML
500 INJECTION, SOLUTION INTRAVENOUS
OUTPATIENT
Start: 2025-07-24

## 2025-07-24 RX ORDER — HUMAN INSULIN 100 [IU]/ML
INJECTION, SOLUTION SUBCUTANEOUS
OUTPATIENT
Start: 2025-07-24

## 2025-07-24 RX ORDER — SODIUM CHLORIDE 9 MG/ML
INJECTION, SOLUTION INTRAVENOUS CONTINUOUS
OUTPATIENT
Start: 2025-07-24

## 2025-07-24 RX ORDER — SODIUM CHLORIDE, SODIUM LACTATE, POTASSIUM CHLORIDE, CALCIUM CHLORIDE 600; 310; 30; 20 MG/100ML; MG/100ML; MG/100ML; MG/100ML
INJECTION, SOLUTION INTRAVENOUS CONTINUOUS
OUTPATIENT
Start: 2025-07-24

## 2025-08-04 ENCOUNTER — APPOINTMENT (OUTPATIENT)
Dept: BEHAVIORAL HEALTH | Age: 35
End: 2025-08-04

## 2025-08-05 ENCOUNTER — APPOINTMENT (OUTPATIENT)
Dept: BEHAVIORAL HEALTH | Age: 35
End: 2025-08-05

## 2025-08-11 ENCOUNTER — VIRTUAL VISIT (OUTPATIENT)
Dept: ENDOCRINOLOGY | Facility: CLINIC | Age: 35
End: 2025-08-11
Payer: COMMERCIAL

## 2025-08-11 VITALS — WEIGHT: 293 LBS | BODY MASS INDEX: 51.91 KG/M2 | HEIGHT: 63 IN

## 2025-08-11 DIAGNOSIS — Z71.3 NUTRITIONAL COUNSELING: Primary | ICD-10-CM

## 2025-08-11 DIAGNOSIS — E66.813 CLASS 3 SEVERE OBESITY WITH SERIOUS COMORBIDITY AND BODY MASS INDEX (BMI) OF 50.0 TO 59.9 IN ADULT, UNSPECIFIED OBESITY TYPE (H): ICD-10-CM

## 2025-08-11 DIAGNOSIS — K76.0 FATTY LIVER: ICD-10-CM

## 2025-08-11 DIAGNOSIS — E55.9 VITAMIN D INSUFFICIENCY: ICD-10-CM

## 2025-08-11 DIAGNOSIS — R73.03 PREDIABETES: ICD-10-CM

## 2025-08-11 DIAGNOSIS — E55.9 VITAMIN D DEFICIENCY: Primary | ICD-10-CM

## 2025-08-11 PROCEDURE — 99207 PR NO CHARGE LOS: CPT | Mod: 95 | Performed by: DIETITIAN, REGISTERED

## 2025-08-11 PROCEDURE — 97803 MED NUTRITION INDIV SUBSEQ: CPT | Mod: 95 | Performed by: DIETITIAN, REGISTERED

## 2025-08-12 RX ORDER — ERGOCALCIFEROL 1.25 MG/1
1.25 CAPSULE, LIQUID FILLED ORAL
Qty: 4 CAPSULE | Refills: 3 | OUTPATIENT
Start: 2025-08-12

## 2025-08-13 ENCOUNTER — TELEPHONE (OUTPATIENT)
Dept: OBGYN | Age: 35
End: 2025-08-13

## 2025-08-13 RX ORDER — METFORMIN HYDROCHLORIDE 500 MG/1
500 TABLET, EXTENDED RELEASE ORAL
COMMUNITY
Start: 2025-07-21

## 2025-08-13 ASSESSMENT — ACTIVITIES OF DAILY LIVING (ADL)
NEEDS_ASSIST: NO
ADL_SHORT_OF_BREATH: NO
ADL_SCORE: 12
SENSORY_SUPPORT_DEVICES: EYEGLASSES
RECENT_DECLINE_ADL: NO
CHRONIC_PAIN_PRESENT: NO
ADL_BEFORE_ADMISSION: INDEPENDENT
HISTORY OF FALLING IN THE LAST YEAR (PRIOR TO ADMISSION): NO

## 2025-08-18 ENCOUNTER — VIRTUAL VISIT (OUTPATIENT)
Dept: PHARMACY | Facility: CLINIC | Age: 35
End: 2025-08-18
Attending: PHARMACIST
Payer: COMMERCIAL

## 2025-08-18 VITALS — BODY MASS INDEX: 51.91 KG/M2 | HEIGHT: 63 IN | WEIGHT: 293 LBS

## 2025-08-18 DIAGNOSIS — F90.0 ATTENTION DEFICIT HYPERACTIVITY DISORDER (ADHD), PREDOMINANTLY INATTENTIVE TYPE: ICD-10-CM

## 2025-08-18 DIAGNOSIS — K76.0 FATTY LIVER: ICD-10-CM

## 2025-08-18 DIAGNOSIS — F31.9 BIPOLAR 1 DISORDER (H): ICD-10-CM

## 2025-08-18 DIAGNOSIS — E66.813 CLASS 3 SEVERE OBESITY DUE TO EXCESS CALORIES WITH SERIOUS COMORBIDITY AND BODY MASS INDEX (BMI) OF 50.0 TO 59.9 IN ADULT (H): Primary | ICD-10-CM

## 2025-08-18 DIAGNOSIS — R73.03 PREDIABETES: ICD-10-CM

## 2025-08-18 DIAGNOSIS — F41.1 GAD (GENERALIZED ANXIETY DISORDER): ICD-10-CM

## 2025-08-18 DIAGNOSIS — D50.9 IRON DEFICIENCY ANEMIA, UNSPECIFIED IRON DEFICIENCY ANEMIA TYPE: ICD-10-CM

## 2025-08-18 ASSESSMENT — PAIN SCALES - GENERAL: PAINLEVEL_OUTOF10: NO PAIN (0)

## 2025-08-19 ENCOUNTER — ANESTHESIA (OUTPATIENT)
Dept: SURGERY | Age: 35
End: 2025-08-19

## 2025-08-19 ENCOUNTER — HOSPITAL ENCOUNTER (OUTPATIENT)
Age: 35
Discharge: HOME | End: 2025-08-19
Attending: OBSTETRICS & GYNECOLOGY | Admitting: OBSTETRICS & GYNECOLOGY

## 2025-08-19 ENCOUNTER — ANESTHESIA EVENT (OUTPATIENT)
Dept: SURGERY | Age: 35
End: 2025-08-19

## 2025-08-19 DIAGNOSIS — N92.0 MENORRHAGIA WITH REGULAR CYCLE: ICD-10-CM

## 2025-08-19 LAB — HCG UR QL: NEGATIVE

## 2025-08-19 PROCEDURE — 10002358 HB ANESTH GENERAL 1ST 1/2 HR: Performed by: OBSTETRICS & GYNECOLOGY

## 2025-08-19 PROCEDURE — 88305 TISSUE EXAM BY PATHOLOGIST: CPT | Performed by: OBSTETRICS & GYNECOLOGY

## 2025-08-19 PROCEDURE — 10002807 HB RX 258: Performed by: OBSTETRICS & GYNECOLOGY

## 2025-08-19 PROCEDURE — 10002359 HB ANESTH GENERAL ADD'L 1/2 HR: Performed by: OBSTETRICS & GYNECOLOGY

## 2025-08-19 PROCEDURE — 10002800 HB RX 250 W HCPCS: Performed by: NURSE ANESTHETIST, CERTIFIED REGISTERED

## 2025-08-19 PROCEDURE — 10006391 HB COMPLEX PROCEDURE: Performed by: OBSTETRICS & GYNECOLOGY

## 2025-08-19 PROCEDURE — 84703 CHORIONIC GONADOTROPIN ASSAY: CPT

## 2025-08-19 PROCEDURE — 10002767 HB EXTENDED RECOVERY PER HOUR: Performed by: OBSTETRICS & GYNECOLOGY

## 2025-08-19 PROCEDURE — 10002117 HB ADDITIONAL SURGERY TIME/30 MIN: Performed by: OBSTETRICS & GYNECOLOGY

## 2025-08-19 PROCEDURE — 10004452 HB PACU ADDL 30 MINUTES: Performed by: OBSTETRICS & GYNECOLOGY

## 2025-08-19 PROCEDURE — 10002801 HB RX 250 W/O HCPCS: Performed by: NURSE ANESTHETIST, CERTIFIED REGISTERED

## 2025-08-19 PROCEDURE — 10004316 HB COUNTER-PREP

## 2025-08-19 PROCEDURE — 10006023 HB SUPPLY 272: Performed by: OBSTETRICS & GYNECOLOGY

## 2025-08-19 PROCEDURE — 10002801 HB RX 250 W/O HCPCS: Performed by: OBSTETRICS & GYNECOLOGY

## 2025-08-19 PROCEDURE — 10004348 HB COUNTER-EVAL PRE-OP

## 2025-08-19 PROCEDURE — 10004451 HB PACU RECOVERY 1ST 30 MINUTES: Performed by: OBSTETRICS & GYNECOLOGY

## 2025-08-19 PROCEDURE — 10002800 HB RX 250 W HCPCS: Performed by: OBSTETRICS & GYNECOLOGY

## 2025-08-19 RX ORDER — DROPERIDOL 2.5 MG/ML
0.62 INJECTION, SOLUTION INTRAMUSCULAR; INTRAVENOUS
Status: DISCONTINUED | OUTPATIENT
Start: 2025-08-19 | End: 2025-08-19 | Stop reason: HOSPADM

## 2025-08-19 RX ORDER — HYDRALAZINE HYDROCHLORIDE 20 MG/ML
5 INJECTION INTRAMUSCULAR; INTRAVENOUS
Status: DISCONTINUED | OUTPATIENT
Start: 2025-08-19 | End: 2025-08-19 | Stop reason: HOSPADM

## 2025-08-19 RX ORDER — ONDANSETRON 2 MG/ML
4 INJECTION INTRAMUSCULAR; INTRAVENOUS
Status: DISCONTINUED | OUTPATIENT
Start: 2025-08-19 | End: 2025-08-19 | Stop reason: HOSPADM

## 2025-08-19 RX ORDER — SODIUM CHLORIDE, SODIUM LACTATE, POTASSIUM CHLORIDE, CALCIUM CHLORIDE 600; 310; 30; 20 MG/100ML; MG/100ML; MG/100ML; MG/100ML
INJECTION, SOLUTION INTRAVENOUS CONTINUOUS
Status: DISCONTINUED | OUTPATIENT
Start: 2025-08-19 | End: 2025-08-19 | Stop reason: HOSPADM

## 2025-08-19 RX ORDER — ONDANSETRON 2 MG/ML
INJECTION INTRAMUSCULAR; INTRAVENOUS PRN
Status: DISCONTINUED | OUTPATIENT
Start: 2025-08-19 | End: 2025-08-19

## 2025-08-19 RX ORDER — DIPHENHYDRAMINE HYDROCHLORIDE 50 MG/ML
12.5 INJECTION, SOLUTION INTRAMUSCULAR; INTRAVENOUS
Status: DISCONTINUED | OUTPATIENT
Start: 2025-08-19 | End: 2025-08-19 | Stop reason: HOSPADM

## 2025-08-19 RX ORDER — NICOTINE POLACRILEX 4 MG
30 LOZENGE BUCCAL
Status: DISCONTINUED | OUTPATIENT
Start: 2025-08-19 | End: 2025-08-19 | Stop reason: HOSPADM

## 2025-08-19 RX ORDER — 0.9 % SODIUM CHLORIDE 0.9 %
10 VIAL (ML) INJECTION PRN
Status: DISCONTINUED | OUTPATIENT
Start: 2025-08-19 | End: 2025-08-19 | Stop reason: HOSPADM

## 2025-08-19 RX ORDER — ACETAMINOPHEN 500 MG
1000 TABLET ORAL
Status: DISCONTINUED | OUTPATIENT
Start: 2025-08-19 | End: 2025-08-19 | Stop reason: HOSPADM

## 2025-08-19 RX ORDER — DEXAMETHASONE SODIUM PHOSPHATE 4 MG/ML
INJECTION, SOLUTION INTRA-ARTICULAR; INTRALESIONAL; INTRAMUSCULAR; INTRAVENOUS; SOFT TISSUE PRN
Status: DISCONTINUED | OUTPATIENT
Start: 2025-08-19 | End: 2025-08-19

## 2025-08-19 RX ORDER — LIDOCAINE HYDROCHLORIDE 20 MG/ML
INJECTION, SOLUTION INFILTRATION; PERINEURAL PRN
Status: DISCONTINUED | OUTPATIENT
Start: 2025-08-19 | End: 2025-08-19

## 2025-08-19 RX ORDER — 0.9 % SODIUM CHLORIDE 0.9 %
2 VIAL (ML) INJECTION EVERY 12 HOURS SCHEDULED
Status: DISCONTINUED | OUTPATIENT
Start: 2025-08-19 | End: 2025-08-19 | Stop reason: HOSPADM

## 2025-08-19 RX ORDER — BUPIVACAINE HYDROCHLORIDE AND EPINEPHRINE 2.5; 5 MG/ML; UG/ML
INJECTION, SOLUTION EPIDURAL; INFILTRATION; INTRACAUDAL; PERINEURAL
Status: DISCONTINUED
Start: 2025-08-19 | End: 2025-08-19 | Stop reason: WASHOUT

## 2025-08-19 RX ORDER — PROCHLORPERAZINE EDISYLATE 5 MG/ML
5 INJECTION INTRAMUSCULAR; INTRAVENOUS
Status: DISCONTINUED | OUTPATIENT
Start: 2025-08-19 | End: 2025-08-19 | Stop reason: HOSPADM

## 2025-08-19 RX ORDER — PROMETHAZINE HYDROCHLORIDE 25 MG/1
25 TABLET ORAL EVERY 6 HOURS PRN
Status: DISCONTINUED | OUTPATIENT
Start: 2025-08-19 | End: 2025-08-19 | Stop reason: HOSPADM

## 2025-08-19 RX ORDER — METOCLOPRAMIDE HYDROCHLORIDE 5 MG/ML
5 INJECTION INTRAMUSCULAR; INTRAVENOUS
Status: DISCONTINUED | OUTPATIENT
Start: 2025-08-19 | End: 2025-08-19 | Stop reason: HOSPADM

## 2025-08-19 RX ORDER — SODIUM CHLORIDE 9 MG/ML
INJECTION, SOLUTION INTRAVENOUS CONTINUOUS
Status: DISCONTINUED | OUTPATIENT
Start: 2025-08-19 | End: 2025-08-19 | Stop reason: HOSPADM

## 2025-08-19 RX ORDER — BUPROPION HYDROCHLORIDE 300 MG/1
300 TABLET ORAL EVERY MORNING
COMMUNITY

## 2025-08-19 RX ORDER — CIPROFLOXACIN 2 MG/ML
400 INJECTION, SOLUTION INTRAVENOUS
Status: DISCONTINUED | OUTPATIENT
Start: 2025-08-19 | End: 2025-08-19 | Stop reason: HOSPADM

## 2025-08-19 RX ORDER — DIPHENHYDRAMINE HYDROCHLORIDE 50 MG/ML
12.5 INJECTION, SOLUTION INTRAMUSCULAR; INTRAVENOUS EVERY 4 HOURS PRN
Status: DISCONTINUED | OUTPATIENT
Start: 2025-08-19 | End: 2025-08-19 | Stop reason: HOSPADM

## 2025-08-19 RX ORDER — PROCHLORPERAZINE EDISYLATE 5 MG/ML
5 INJECTION INTRAMUSCULAR; INTRAVENOUS EVERY 4 HOURS PRN
Status: DISCONTINUED | OUTPATIENT
Start: 2025-08-19 | End: 2025-08-19 | Stop reason: HOSPADM

## 2025-08-19 RX ORDER — CIPROFLOXACIN 2 MG/ML
INJECTION, SOLUTION INTRAVENOUS
Status: DISCONTINUED
Start: 2025-08-19 | End: 2025-08-19 | Stop reason: HOSPADM

## 2025-08-19 RX ORDER — PROPOFOL 10 MG/ML
INJECTION, EMULSION INTRAVENOUS PRN
Status: DISCONTINUED | OUTPATIENT
Start: 2025-08-19 | End: 2025-08-19

## 2025-08-19 RX ORDER — METOCLOPRAMIDE HYDROCHLORIDE 5 MG/ML
5 INJECTION INTRAMUSCULAR; INTRAVENOUS EVERY 6 HOURS PRN
Status: DISCONTINUED | OUTPATIENT
Start: 2025-08-19 | End: 2025-08-19 | Stop reason: HOSPADM

## 2025-08-19 RX ORDER — DEXTROSE MONOHYDRATE 25 G/50ML
25 INJECTION, SOLUTION INTRAVENOUS PRN
Status: DISCONTINUED | OUTPATIENT
Start: 2025-08-19 | End: 2025-08-19 | Stop reason: HOSPADM

## 2025-08-19 RX ORDER — KETOROLAC TROMETHAMINE 15 MG/ML
15 INJECTION, SOLUTION INTRAMUSCULAR; INTRAVENOUS EVERY 6 HOURS
Status: DISCONTINUED | OUTPATIENT
Start: 2025-08-19 | End: 2025-08-19 | Stop reason: HOSPADM

## 2025-08-19 RX ORDER — DEXMEDETOMIDINE IN 0.9 % NACL 20 MCG/5ML
SYRINGE (ML) INTRAVENOUS PRN
Status: DISCONTINUED | OUTPATIENT
Start: 2025-08-19 | End: 2025-08-19

## 2025-08-19 RX ORDER — ONDANSETRON 2 MG/ML
4 INJECTION INTRAMUSCULAR; INTRAVENOUS 2 TIMES DAILY PRN
Status: DISCONTINUED | OUTPATIENT
Start: 2025-08-19 | End: 2025-08-19 | Stop reason: HOSPADM

## 2025-08-19 RX ORDER — IBUPROFEN 400 MG/1
600 TABLET, FILM COATED ORAL EVERY 6 HOURS SCHEDULED
Status: DISCONTINUED | OUTPATIENT
Start: 2025-08-20 | End: 2025-08-19 | Stop reason: HOSPADM

## 2025-08-19 RX ORDER — ROCURONIUM BROMIDE 10 MG/ML
INJECTION, SOLUTION INTRAVENOUS PRN
Status: DISCONTINUED | OUTPATIENT
Start: 2025-08-19 | End: 2025-08-19

## 2025-08-19 RX ORDER — CLOMIPRAMINE HYDROCHLORIDE 75 MG/1
75 CAPSULE ORAL AT BEDTIME
COMMUNITY

## 2025-08-19 RX ORDER — DEXAMETHASONE SODIUM PHOSPHATE 4 MG/ML
4 INJECTION, SOLUTION INTRA-ARTICULAR; INTRALESIONAL; INTRAMUSCULAR; INTRAVENOUS; SOFT TISSUE
Status: DISCONTINUED | OUTPATIENT
Start: 2025-08-19 | End: 2025-08-19 | Stop reason: HOSPADM

## 2025-08-19 RX ORDER — NALOXONE HCL 0.4 MG/ML
0.2 VIAL (ML) INJECTION EVERY 5 MIN PRN
Status: DISCONTINUED | OUTPATIENT
Start: 2025-08-19 | End: 2025-08-19 | Stop reason: HOSPADM

## 2025-08-19 RX ORDER — HUMAN INSULIN 100 [IU]/ML
INJECTION, SOLUTION SUBCUTANEOUS
Status: DISCONTINUED | OUTPATIENT
Start: 2025-08-19 | End: 2025-08-19 | Stop reason: HOSPADM

## 2025-08-19 RX ADMIN — PROPOFOL 230 MG: 10 INJECTION, EMULSION INTRAVENOUS at 10:08

## 2025-08-19 RX ADMIN — SODIUM CHLORIDE, POTASSIUM CHLORIDE, SODIUM LACTATE AND CALCIUM CHLORIDE: 600; 310; 30; 20 INJECTION, SOLUTION INTRAVENOUS at 09:46

## 2025-08-19 RX ADMIN — CIPROFLOXACIN 400 MG: 400 INJECTION, SOLUTION INTRAVENOUS at 10:00

## 2025-08-19 RX ADMIN — FENTANYL CITRATE 100 MCG: 50 INJECTION INTRAMUSCULAR; INTRAVENOUS at 10:08

## 2025-08-19 RX ADMIN — Medication 8 MCG: at 10:14

## 2025-08-19 RX ADMIN — ONDANSETRON 4 MG: 2 INJECTION INTRAMUSCULAR; INTRAVENOUS at 10:13

## 2025-08-19 RX ADMIN — LIDOCAINE HYDROCHLORIDE 60 MG: 20 INJECTION, SOLUTION INFILTRATION; PERINEURAL at 10:08

## 2025-08-19 RX ADMIN — ROCURONIUM BROMIDE 50 MG: 10 INJECTION INTRAVENOUS at 10:08

## 2025-08-19 RX ADMIN — SUGAMMADEX 300 MG: 100 INJECTION, SOLUTION INTRAVENOUS at 10:32

## 2025-08-19 RX ADMIN — KETOROLAC TROMETHAMINE 15 MG: 15 INJECTION, SOLUTION INTRAMUSCULAR; INTRAVENOUS at 11:26

## 2025-08-19 RX ADMIN — METRONIDAZOLE 500 MG: 500 INJECTION, SOLUTION INTRAVENOUS at 10:00

## 2025-08-19 RX ADMIN — DEXAMETHASONE SODIUM PHOSPHATE 8 MG: 4 INJECTION INTRA-ARTICULAR; INTRALESIONAL; INTRAMUSCULAR; INTRAVENOUS; SOFT TISSUE at 10:13

## 2025-08-19 ASSESSMENT — PAIN SCALES - GENERAL
PAINLEVEL_OUTOF10: 1
PAINLEVEL_OUTOF10: 1
PAINLEVEL_OUTOF10: 2
PAINLEVEL_OUTOF10: 1
PAINLEVEL_OUTOF10: 2
PAINLEVEL_OUTOF10: 3
PAINLEVEL_OUTOF10: 0

## 2025-08-19 ASSESSMENT — ENCOUNTER SYMPTOMS: EXERCISE TOLERANCE: GOOD (>4 METS)

## 2025-08-20 ENCOUNTER — DOCUMENTATION ONLY (OUTPATIENT)
Dept: ENDOCRINOLOGY | Facility: CLINIC | Age: 35
End: 2025-08-20
Payer: COMMERCIAL

## 2025-08-20 ENCOUNTER — TELEPHONE (OUTPATIENT)
Dept: OBGYN | Age: 35
End: 2025-08-20

## 2025-08-20 VITALS
HEIGHT: 63 IN | DIASTOLIC BLOOD PRESSURE: 76 MMHG | SYSTOLIC BLOOD PRESSURE: 146 MMHG | BODY MASS INDEX: 51.91 KG/M2 | HEART RATE: 91 BPM | TEMPERATURE: 97 F | RESPIRATION RATE: 19 BRPM | OXYGEN SATURATION: 100 % | WEIGHT: 293 LBS

## 2025-08-25 ENCOUNTER — APPOINTMENT (OUTPATIENT)
Dept: BEHAVIORAL HEALTH | Age: 35
End: 2025-08-25

## 2025-08-25 DIAGNOSIS — F42.2 MIXED OBSESSIONAL THOUGHTS AND ACTS: ICD-10-CM

## 2025-08-25 DIAGNOSIS — F90.0 ATTENTION DEFICIT HYPERACTIVITY DISORDER (ADHD), PREDOMINANTLY INATTENTIVE TYPE: ICD-10-CM

## 2025-08-25 DIAGNOSIS — F41.1 GAD (GENERALIZED ANXIETY DISORDER): ICD-10-CM

## 2025-08-25 DIAGNOSIS — F31.9 BIPOLAR 1 DISORDER  (CMD): Primary | ICD-10-CM

## 2025-08-27 ENCOUNTER — APPOINTMENT (OUTPATIENT)
Dept: OBGYN | Age: 35
End: 2025-08-27

## 2025-08-27 VITALS
BODY MASS INDEX: 51.91 KG/M2 | SYSTOLIC BLOOD PRESSURE: 114 MMHG | WEIGHT: 293 LBS | HEIGHT: 63 IN | DIASTOLIC BLOOD PRESSURE: 74 MMHG

## 2025-08-27 DIAGNOSIS — Z09 SURGERY FOLLOW-UP: Primary | ICD-10-CM

## 2025-08-27 DIAGNOSIS — Z98.890 HISTORY OF ENDOMETRIAL ABLATION: ICD-10-CM

## 2025-08-27 PROCEDURE — 99024 POSTOP FOLLOW-UP VISIT: CPT | Performed by: OBSTETRICS & GYNECOLOGY

## 2025-09-11 ENCOUNTER — APPOINTMENT (OUTPATIENT)
Dept: OBGYN | Age: 35
End: 2025-09-11

## 2025-09-12 ENCOUNTER — APPOINTMENT (OUTPATIENT)
Dept: BEHAVIORAL HEALTH | Age: 35
End: 2025-09-12

## 2025-09-30 ENCOUNTER — APPOINTMENT (OUTPATIENT)
Dept: BEHAVIORAL HEALTH | Age: 35
End: 2025-09-30

## 2025-10-28 ENCOUNTER — APPOINTMENT (OUTPATIENT)
Dept: BEHAVIORAL HEALTH | Age: 35
End: 2025-10-28

## 2025-11-18 ENCOUNTER — APPOINTMENT (OUTPATIENT)
Dept: BEHAVIORAL HEALTH | Age: 35
End: 2025-11-18

## 2025-12-09 ENCOUNTER — APPOINTMENT (OUTPATIENT)
Dept: BEHAVIORAL HEALTH | Age: 35
End: 2025-12-09

## 2026-04-13 ENCOUNTER — APPOINTMENT (OUTPATIENT)
Dept: LAB | Age: 36
End: 2026-04-13

## 2026-04-20 ENCOUNTER — APPOINTMENT (OUTPATIENT)
Dept: INTERNAL MEDICINE | Age: 36
End: 2026-04-20

## (undated) DEVICE — SPONGE GAUZE 4X4IN CTN 12 PLY HI ABS WOVEN STRL LF

## (undated) DEVICE — BLADE SURG 15 STRL PRSNA + PLMR

## (undated) DEVICE — STRIP SKIN CLSR L4 IN X W12 IN REINFORCE STERI-STRIP POLY

## (undated) DEVICE — GLOVE SURG 7 PROTEXIS PI CLASSIC PWDR FREE BEAD CUFF PLISPRN

## (undated) DEVICE — NEEDLE HPO L1 12 IN OD25 GA NEEDLE-PRO BVL ORNG

## (undated) DEVICE — GLOVE SURG 6.5 PROTEXIS LF CRM PF BEAD CUFF STRL PLISPRN

## (undated) DEVICE — SOLUTION PREP 4OZ 4% CHG LIQUID ANTIMICROBIAL FLPTP BTL

## (undated) DEVICE — WRAP CMPR 5YDX3IN COBAN POR SLFADH ELASTIC LTWT HAND TEAR LF

## (undated) DEVICE — 2% CHLORHEXIDINE SKIN PREP ORANGE 26ML

## (undated) DEVICE — TUBING SCT CLR 12FT .25IN MDVC MAXI-GRIP NCDTV MALE TO MALE

## (undated) DEVICE — SAFETY STRAPS ARM

## (undated) DEVICE — GLOVE SURG 8 PROTEXIS LF CRM PF BEAD CUFF STRL PLISPRN 12IN

## (undated) DEVICE — Device

## (undated) DEVICE — TOWEL L28 IN X W17 IN CTN WOVEN PRM BLUE SURG

## (undated) DEVICE — KIT UT ABLT CNTRL NOVASURE SURESOUND DISP STRL ENDOMETRIUM

## (undated) DEVICE — COVER SURGEON CNTRL NA LIGHT HNDL HARMONYAIR M SERIES

## (undated) DEVICE — SYRINGE 10 ML CNTRL CONC TIP PYROGEN FREE DEHP FREE LOK MED

## (undated) DEVICE — ADHESIVE SKIN CLSR DERMABOND ADVANCED .7 ML LIQUID APL